# Patient Record
Sex: FEMALE | Race: WHITE | Employment: OTHER | URBAN - METROPOLITAN AREA
[De-identification: names, ages, dates, MRNs, and addresses within clinical notes are randomized per-mention and may not be internally consistent; named-entity substitution may affect disease eponyms.]

---

## 2017-02-27 ENCOUNTER — PATIENT OUTREACH (OUTPATIENT)
Dept: FAMILY MEDICINE CLINIC | Facility: CLINIC | Age: 67
End: 2017-02-27

## 2017-03-09 ENCOUNTER — TELEPHONE (OUTPATIENT)
Dept: FAMILY MEDICINE CLINIC | Facility: CLINIC | Age: 67
End: 2017-03-09

## 2017-03-09 NOTE — TELEPHONE ENCOUNTER
No future appointments.     Please call patient to have her schedule a follow up with dr. Froylan Cat to go over recent labs

## 2017-03-10 NOTE — TELEPHONE ENCOUNTER
Future Appointments  Date Time Provider Baldev Corona   3/17/2017 9:45 AM Edna Foley MD [facility] [address] [facility] [address]

## 2017-03-17 ENCOUNTER — OFFICE VISIT (OUTPATIENT)
Dept: FAMILY MEDICINE CLINIC | Facility: CLINIC | Age: 67
End: 2017-03-17

## 2017-03-17 VITALS
RESPIRATION RATE: 12 BRPM | TEMPERATURE: 99 F | WEIGHT: 293 LBS | HEART RATE: 63 BPM | BODY MASS INDEX: 48.82 KG/M2 | DIASTOLIC BLOOD PRESSURE: 80 MMHG | OXYGEN SATURATION: 99 % | HEIGHT: 65 IN | SYSTOLIC BLOOD PRESSURE: 136 MMHG

## 2017-03-17 DIAGNOSIS — E78.00 PURE HYPERCHOLESTEROLEMIA: ICD-10-CM

## 2017-03-17 DIAGNOSIS — I10 ESSENTIAL HYPERTENSION: ICD-10-CM

## 2017-03-17 DIAGNOSIS — E66.01 MORBID OBESITY, UNSPECIFIED OBESITY TYPE (HCC): ICD-10-CM

## 2017-03-17 DIAGNOSIS — E11.9 CONTROLLED TYPE 2 DIABETES MELLITUS WITHOUT COMPLICATION, UNSPECIFIED LONG TERM INSULIN USE STATUS: Primary | ICD-10-CM

## 2017-03-17 PROCEDURE — 99214 OFFICE O/P EST MOD 30 MIN: CPT | Performed by: FAMILY MEDICINE

## 2017-03-17 RX ORDER — LISINOPRIL 10 MG/1
10 TABLET ORAL DAILY
Qty: 30 TABLET | Refills: 5 | Status: SHIPPED | OUTPATIENT
Start: 2017-03-17 | End: 2017-09-10

## 2017-03-17 RX ORDER — PRAVASTATIN SODIUM 40 MG
TABLET ORAL
Qty: 30 TABLET | Refills: 5 | Status: SHIPPED | OUTPATIENT
Start: 2017-03-17 | End: 2017-09-10

## 2017-03-17 NOTE — PROGRESS NOTES
HPI:    Patient ID: Karuan Davidson is a 77year old female. HPI  Patient is here for follow-up of diabetes high blood pressure, high cholesterol, obesity. , She is doing fine and has no complaints.   Review of Systems  Except for the above, all other ROS diagnosis)  Morbid obesity, unspecified obesity type (hcc)  Essential hypertension  Pure hypercholesterolemia    Labs reviewed showing hemoglobin A1c the same at 7.5. Will increase metformin to 850 mg twice a day. Continue other medicines as directed.   C

## 2017-03-20 ENCOUNTER — TELEPHONE (OUTPATIENT)
Dept: FAMILY MEDICINE CLINIC | Facility: CLINIC | Age: 67
End: 2017-03-20

## 2017-03-20 RX ORDER — LANCETS
1 EACH MISCELLANEOUS 2 TIMES DAILY
Qty: 1 BOX | Refills: 3 | Status: SHIPPED | OUTPATIENT
Start: 2017-03-20 | End: 2017-11-28

## 2017-03-21 ENCOUNTER — HOSPITAL (OUTPATIENT)
Dept: OTHER | Age: 67
End: 2017-03-21
Attending: FAMILY MEDICINE

## 2017-03-27 ENCOUNTER — TELEPHONE (OUTPATIENT)
Dept: FAMILY MEDICINE CLINIC | Facility: CLINIC | Age: 67
End: 2017-03-27

## 2017-03-27 NOTE — TELEPHONE ENCOUNTER
Pt will schedule consultation with GI for possible colonoscopy dr. Mahendra Torres  I did inform patient that we cannot get her in sooner due to no symptoms.

## 2017-04-13 ENCOUNTER — APPOINTMENT (OUTPATIENT)
Dept: LAB | Age: 67
End: 2017-04-13
Attending: FAMILY MEDICINE
Payer: COMMERCIAL

## 2017-04-13 ENCOUNTER — TELEPHONE (OUTPATIENT)
Dept: FAMILY MEDICINE CLINIC | Facility: CLINIC | Age: 67
End: 2017-04-13

## 2017-04-13 DIAGNOSIS — R30.0 DYSURIA: Primary | ICD-10-CM

## 2017-04-13 NOTE — TELEPHONE ENCOUNTER
Patient said she was sick last week and had diarrhea, she now has burning and noticed some blood on urination. She has increased her fluids and drinking cranberry juice.  Dr Yola Hagan ordered a UA with Reflex with the Easter holiday since patient is unable to

## 2017-04-18 ENCOUNTER — TELEPHONE (OUTPATIENT)
Dept: FAMILY MEDICINE CLINIC | Facility: CLINIC | Age: 67
End: 2017-04-18

## 2017-04-18 RX ORDER — CEPHALEXIN 500 MG/1
500 CAPSULE ORAL 2 TIMES DAILY
Qty: 20 CAPSULE | Refills: 0 | Status: SHIPPED | OUTPATIENT
Start: 2017-04-18 | End: 2017-04-28

## 2017-04-18 NOTE — TELEPHONE ENCOUNTER
----- Message from Chandrika Alfonso MD sent at 4/16/2017 11:57 PM CDT -----  Slightly positive urine culture. Keflex 500 mg twice a day for 10 days. Recheck urine test after that.

## 2017-05-01 ENCOUNTER — ANESTHESIA EVENT (OUTPATIENT)
Dept: ENDOSCOPY | Facility: HOSPITAL | Age: 67
End: 2017-05-01
Payer: COMMERCIAL

## 2017-05-01 ENCOUNTER — SURGERY (OUTPATIENT)
Age: 67
End: 2017-05-01

## 2017-05-01 ENCOUNTER — ANESTHESIA (OUTPATIENT)
Dept: ENDOSCOPY | Facility: HOSPITAL | Age: 67
End: 2017-05-01
Payer: COMMERCIAL

## 2017-05-01 ENCOUNTER — HOSPITAL ENCOUNTER (OUTPATIENT)
Facility: HOSPITAL | Age: 67
Setting detail: HOSPITAL OUTPATIENT SURGERY
Discharge: HOME | End: 2017-05-01
Attending: INTERNAL MEDICINE | Admitting: INTERNAL MEDICINE
Payer: COMMERCIAL

## 2017-05-01 VITALS
SYSTOLIC BLOOD PRESSURE: 134 MMHG | HEART RATE: 66 BPM | TEMPERATURE: 97 F | OXYGEN SATURATION: 95 % | HEIGHT: 64 IN | WEIGHT: 293 LBS | DIASTOLIC BLOOD PRESSURE: 70 MMHG | BODY MASS INDEX: 50.02 KG/M2 | RESPIRATION RATE: 17 BRPM

## 2017-05-01 DIAGNOSIS — K64.9 HEMORRHOIDS: Primary | ICD-10-CM

## 2017-05-01 DIAGNOSIS — K57.30 DIVERTICULOSIS OF COLON: ICD-10-CM

## 2017-05-01 DIAGNOSIS — K63.5 POLYP OF HEPATIC FLEXURE OF COLON: ICD-10-CM

## 2017-05-01 PROCEDURE — 0DBK8ZX EXCISION OF ASCENDING COLON, VIA NATURAL OR ARTIFICIAL OPENING ENDOSCOPIC, DIAGNOSTIC: ICD-10-PCS | Performed by: INTERNAL MEDICINE

## 2017-05-01 PROCEDURE — 0DBL8ZX EXCISION OF TRANSVERSE COLON, VIA NATURAL OR ARTIFICIAL OPENING ENDOSCOPIC, DIAGNOSTIC: ICD-10-PCS | Performed by: INTERNAL MEDICINE

## 2017-05-01 PROCEDURE — 88305 TISSUE EXAM BY PATHOLOGIST: CPT | Performed by: INTERNAL MEDICINE

## 2017-05-01 PROCEDURE — 3E0H8GC INTRODUCTION OF OTHER THERAPEUTIC SUBSTANCE INTO LOWER GI, VIA NATURAL OR ARTIFICIAL OPENING ENDOSCOPIC: ICD-10-PCS | Performed by: INTERNAL MEDICINE

## 2017-05-01 RX ORDER — SODIUM CHLORIDE 0.9 % (FLUSH) 0.9 %
10 SYRINGE (ML) INJECTION AS NEEDED
Status: DISCONTINUED | OUTPATIENT
Start: 2017-05-01 | End: 2017-05-01

## 2017-05-01 RX ORDER — SODIUM CHLORIDE, SODIUM LACTATE, POTASSIUM CHLORIDE, CALCIUM CHLORIDE 600; 310; 30; 20 MG/100ML; MG/100ML; MG/100ML; MG/100ML
INJECTION, SOLUTION INTRAVENOUS CONTINUOUS
Status: DISCONTINUED | OUTPATIENT
Start: 2017-05-01 | End: 2017-05-01

## 2017-05-01 RX ORDER — LIDOCAINE HYDROCHLORIDE 10 MG/ML
INJECTION, SOLUTION EPIDURAL; INFILTRATION; INTRACAUDAL; PERINEURAL AS NEEDED
Status: DISCONTINUED | OUTPATIENT
Start: 2017-05-01 | End: 2017-05-01 | Stop reason: SURG

## 2017-05-01 RX ORDER — ONDANSETRON 2 MG/ML
4 INJECTION INTRAMUSCULAR; INTRAVENOUS ONCE AS NEEDED
Status: DISCONTINUED | OUTPATIENT
Start: 2017-05-01 | End: 2017-05-01

## 2017-05-01 RX ORDER — ONDANSETRON 2 MG/ML
INJECTION INTRAMUSCULAR; INTRAVENOUS AS NEEDED
Status: DISCONTINUED | OUTPATIENT
Start: 2017-05-01 | End: 2017-05-01 | Stop reason: SURG

## 2017-05-01 RX ORDER — SODIUM CHLORIDE, SODIUM LACTATE, POTASSIUM CHLORIDE, CALCIUM CHLORIDE 600; 310; 30; 20 MG/100ML; MG/100ML; MG/100ML; MG/100ML
INJECTION, SOLUTION INTRAVENOUS CONTINUOUS PRN
Status: DISCONTINUED | OUTPATIENT
Start: 2017-05-01 | End: 2017-05-01 | Stop reason: SURG

## 2017-05-01 RX ORDER — MIDAZOLAM HYDROCHLORIDE 1 MG/ML
1 INJECTION INTRAMUSCULAR; INTRAVENOUS EVERY 5 MIN PRN
Status: DISCONTINUED | OUTPATIENT
Start: 2017-05-01 | End: 2017-05-01

## 2017-05-01 RX ADMIN — ONDANSETRON 4 MG: 2 INJECTION INTRAMUSCULAR; INTRAVENOUS at 14:01:00

## 2017-05-01 RX ADMIN — SODIUM CHLORIDE, SODIUM LACTATE, POTASSIUM CHLORIDE, CALCIUM CHLORIDE: 600; 310; 30; 20 INJECTION, SOLUTION INTRAVENOUS at 14:20:00

## 2017-05-01 RX ADMIN — SODIUM CHLORIDE, SODIUM LACTATE, POTASSIUM CHLORIDE, CALCIUM CHLORIDE: 600; 310; 30; 20 INJECTION, SOLUTION INTRAVENOUS at 13:55:00

## 2017-05-01 RX ADMIN — LIDOCAINE HYDROCHLORIDE 50 MG: 10 INJECTION, SOLUTION EPIDURAL; INFILTRATION; INTRACAUDAL; PERINEURAL at 13:56:00

## 2017-05-01 NOTE — OPERATIVE REPORT
COLONOSCOPY REPORT    Patient Name:  Johana Pena Record #: [id number]  YOB: 1950  Date of Procedure: 5/1/2017    Referring physician: Justino Taylor MD    Surgeon:  Faith Rubio.  Emani Parson MD    Pre-op diagnosis: Colon cancer screening Good (clear liquid covering up to 25% of mucosa, but >90% of mucosa seen)  1 Excellent (>95% of mucosa seen)

## 2017-05-01 NOTE — H&P
RE-PROCEDURE UPDATE    HPI: Heather Perera is a 77year old female. 9/14/1950. Patient presents for a colonoscopy. ALLERGIES: No Known Allergies      No current outpatient prescriptions on file.   Past Medical History   Diagnosis Date   • Laboratory ex

## 2017-05-01 NOTE — ANESTHESIA POSTPROCEDURE EVALUATION
Patient: Lisa Jewell    Procedure Summary     Date Anesthesia Start Anesthesia Stop Room / Location    05/01/17 1355  [facility] ENDOSCOPY 05 / [facility] ENDOSCOPY       Procedure Diagnosis Surgeon Responsible Provider    COLONOSCOPY (N/A ) Special screening for [facility]

## 2017-05-01 NOTE — ANESTHESIA PREPROCEDURE EVALUATION
Anesthesia PreOp Note    HPI:     Carlyn Nicolas is a 77year old female who presents for preoperative consultation requested by: Urszula Bran MD    Date of Surgery: 5/1/2017    Procedure(s):  COLONOSCOPY  Indication: Special screening for malignant ne MOUTH TWO TIMES A DAY WITH MEALS ) Disp: 60 tablet Rfl: 3 4/30/2017       No current [facility]-ordered facility-administered medications on file. No current [facility]-ordered outpatient prescriptions on file. No Known Allergies    History reviewed.  No pertinent analgesics  Informed Consent Plan and Risks Discussed With:  Patient and spouse      I have informed Leah Maloney  of the nature of the anesthetic plan, benefits, risks, major complications, and any alternative forms of anesthetic management.    All of the

## 2017-06-02 ENCOUNTER — OFFICE VISIT (OUTPATIENT)
Dept: FAMILY MEDICINE CLINIC | Facility: CLINIC | Age: 67
End: 2017-06-02

## 2017-06-02 ENCOUNTER — APPOINTMENT (OUTPATIENT)
Dept: GENERAL RADIOLOGY | Age: 67
End: 2017-06-02
Attending: FAMILY MEDICINE
Payer: COMMERCIAL

## 2017-06-02 ENCOUNTER — APPOINTMENT (OUTPATIENT)
Dept: GENERAL RADIOLOGY | Facility: HOSPITAL | Age: 67
DRG: 202 | End: 2017-06-02
Attending: EMERGENCY MEDICINE
Payer: COMMERCIAL

## 2017-06-02 ENCOUNTER — HOSPITAL ENCOUNTER (OUTPATIENT)
Age: 67
Discharge: ANOTHER HOSPITAL | End: 2017-06-02
Attending: FAMILY MEDICINE
Payer: COMMERCIAL

## 2017-06-02 ENCOUNTER — HOSPITAL ENCOUNTER (INPATIENT)
Facility: HOSPITAL | Age: 67
LOS: 2 days | Discharge: HOME | DRG: 202 | End: 2017-06-04
Attending: EMERGENCY MEDICINE | Admitting: HOSPITALIST
Payer: COMMERCIAL

## 2017-06-02 VITALS
WEIGHT: 293 LBS | BODY MASS INDEX: 50.02 KG/M2 | HEART RATE: 72 BPM | TEMPERATURE: 98 F | DIASTOLIC BLOOD PRESSURE: 74 MMHG | HEIGHT: 64 IN | SYSTOLIC BLOOD PRESSURE: 136 MMHG | RESPIRATION RATE: 22 BRPM | OXYGEN SATURATION: 94 %

## 2017-06-02 VITALS
OXYGEN SATURATION: 99 % | DIASTOLIC BLOOD PRESSURE: 65 MMHG | TEMPERATURE: 99 F | SYSTOLIC BLOOD PRESSURE: 146 MMHG | RESPIRATION RATE: 20 BRPM | HEART RATE: 80 BPM

## 2017-06-02 DIAGNOSIS — J40 BRONCHITIS: Primary | ICD-10-CM

## 2017-06-02 DIAGNOSIS — R73.9 HYPERGLYCEMIA: ICD-10-CM

## 2017-06-02 DIAGNOSIS — D72.829 LEUKOCYTOSIS, UNSPECIFIED TYPE: ICD-10-CM

## 2017-06-02 DIAGNOSIS — R06.00 DYSPNEA ON EXERTION: Primary | ICD-10-CM

## 2017-06-02 DIAGNOSIS — R06.2 WHEEZING: ICD-10-CM

## 2017-06-02 DIAGNOSIS — R09.02 HYPOXIA: Primary | ICD-10-CM

## 2017-06-02 DIAGNOSIS — R60.0 PEDAL EDEMA: ICD-10-CM

## 2017-06-02 DIAGNOSIS — Z02.9 ENCOUNTER FOR ADMINISTRATIVE EXAMINATIONS: ICD-10-CM

## 2017-06-02 DIAGNOSIS — R09.02 HYPOXIA: ICD-10-CM

## 2017-06-02 PROBLEM — R06.09 DYSPNEA ON EXERTION: Status: ACTIVE | Noted: 2017-06-02

## 2017-06-02 PROBLEM — E87.3 METABOLIC ALKALOSIS: Status: ACTIVE | Noted: 2017-06-02

## 2017-06-02 PROBLEM — Z91.81 AT RISK FOR FALLING: Status: ACTIVE | Noted: 2017-06-02

## 2017-06-02 PROBLEM — E87.5 HYPERKALEMIA: Status: ACTIVE | Noted: 2017-06-02

## 2017-06-02 PROCEDURE — 94640 AIRWAY INHALATION TREATMENT: CPT

## 2017-06-02 PROCEDURE — 99215 OFFICE O/P EST HI 40 MIN: CPT

## 2017-06-02 PROCEDURE — 99223 1ST HOSP IP/OBS HIGH 75: CPT | Performed by: HOSPITALIST

## 2017-06-02 PROCEDURE — 71020 XR CHEST PA + LAT CHEST (CPT=71020): CPT | Performed by: FAMILY MEDICINE

## 2017-06-02 PROCEDURE — 99205 OFFICE O/P NEW HI 60 MIN: CPT

## 2017-06-02 PROCEDURE — 71010 XR CHEST AP PORTABLE  (CPT=71010): CPT | Performed by: EMERGENCY MEDICINE

## 2017-06-02 PROCEDURE — 94640 AIRWAY INHALATION TREATMENT: CPT | Performed by: NURSE PRACTITIONER

## 2017-06-02 RX ORDER — IPRATROPIUM BROMIDE AND ALBUTEROL SULFATE 2.5; .5 MG/3ML; MG/3ML
3 SOLUTION RESPIRATORY (INHALATION) EVERY 6 HOURS
Status: DISCONTINUED | OUTPATIENT
Start: 2017-06-02 | End: 2017-06-03

## 2017-06-02 RX ORDER — IPRATROPIUM BROMIDE AND ALBUTEROL SULFATE 2.5; .5 MG/3ML; MG/3ML
3 SOLUTION RESPIRATORY (INHALATION) ONCE
Status: COMPLETED | OUTPATIENT
Start: 2017-06-02 | End: 2017-06-02

## 2017-06-02 RX ORDER — METHYLPREDNISOLONE SODIUM SUCCINATE 40 MG/ML
40 INJECTION, POWDER, LYOPHILIZED, FOR SOLUTION INTRAMUSCULAR; INTRAVENOUS EVERY 8 HOURS
Status: COMPLETED | OUTPATIENT
Start: 2017-06-03 | End: 2017-06-03

## 2017-06-02 RX ORDER — PRAVASTATIN SODIUM 10 MG
10 TABLET ORAL NIGHTLY
Status: DISCONTINUED | OUTPATIENT
Start: 2017-06-02 | End: 2017-06-04

## 2017-06-02 RX ORDER — LISINOPRIL 10 MG/1
10 TABLET ORAL DAILY
Status: DISCONTINUED | OUTPATIENT
Start: 2017-06-02 | End: 2017-06-04

## 2017-06-02 RX ORDER — METHYLPREDNISOLONE SODIUM SUCCINATE 125 MG/2ML
125 INJECTION, POWDER, LYOPHILIZED, FOR SOLUTION INTRAMUSCULAR; INTRAVENOUS ONCE
Status: COMPLETED | OUTPATIENT
Start: 2017-06-02 | End: 2017-06-02

## 2017-06-02 RX ORDER — HEPARIN SODIUM 5000 [USP'U]/ML
5000 INJECTION, SOLUTION INTRAVENOUS; SUBCUTANEOUS EVERY 8 HOURS SCHEDULED
Status: DISCONTINUED | OUTPATIENT
Start: 2017-06-02 | End: 2017-06-04

## 2017-06-02 RX ORDER — DEXTROSE MONOHYDRATE 25 G/50ML
50 INJECTION, SOLUTION INTRAVENOUS
Status: DISCONTINUED | OUTPATIENT
Start: 2017-06-02 | End: 2017-06-04

## 2017-06-02 RX ORDER — IPRATROPIUM BROMIDE AND ALBUTEROL SULFATE 2.5; .5 MG/3ML; MG/3ML
3 SOLUTION RESPIRATORY (INHALATION) ONCE
Status: DISCONTINUED | OUTPATIENT
Start: 2017-06-02 | End: 2017-06-02

## 2017-06-02 RX ADMIN — IPRATROPIUM BROMIDE AND ALBUTEROL SULFATE 3 ML: 2.5; .5 SOLUTION RESPIRATORY (INHALATION) at 14:06:00

## 2017-06-02 NOTE — ED INITIAL ASSESSMENT (HPI)
Pt c/o 5 days of cough with shortness of breath. Went to walk in clinic, received one neb treatment at 14:30 today. Arrived here dyspneic. Dr. Katelynn Rosado at bedside.   Continuous pulse ox, neb begun on arrival.

## 2017-06-02 NOTE — ED NOTES
2 attempted IV start to each forearm unsuccessful, unable to fully advance catheter. Dr. St Speak notified.

## 2017-06-02 NOTE — ED PROVIDER NOTES
Patient Seen in: [facility] [address] [place] [address],[address]    History   Patient presents with:  Breathing Problem    Stated Complaint: cough    HPI  78 yo F here with cough and congestion - sent here from [facility] - received one treatment there and apparently was 05/01/1987    Smokeless Status: Never Used                        Alcohol Use: No                Review of Systems    Positive for stated complaint: cough  Other systems are as noted in HPI. Constitutional and vital signs reviewed.       All other systems (cpt=71020)    6/2/2017  PROCEDURE:  XR CHEST PA + LAT CHEST (CPT=71020)  INDICATIONS:  cough  COMPARISON:  [facility] , CHEST 1-VIEW, 6/29/2004, 0:13. TECHNIQUE:  PA and lateral chest radiographs were obtained.   PATIENT STATED HISTORY: (As transcribed by Lino Disposition and Plan     Clinical Impression:  Hypoxia  (primary encounter diagnosis)  Wheezing  Pedal edema    Disposition: Ic to ed    Follow-up:        Go to the ER now for further evaluation.        Medications Prescribed:  Current Discharge Medica

## 2017-06-02 NOTE — ED NOTES
Pt without O2 running, with continuous pulse ox. O2 sat stays between 94 and 97% while pt sitting quietly.

## 2017-06-02 NOTE — PROGRESS NOTES
HPI:   Susan Dai is a 77year old female who presents with ill symptoms for  5  days.  Patient reports sore throat only at the beginning of sx's, cough with clear/yellow colored sputum, cough is not keeping pt up at night, wheezing, OTC cold meds have n exertion  GI: no nausea or abdominal pain, appetite normal  NEURO: admits to headaches with coughing    EXAM:   /74 mmHg  Pulse 72  Temp(Src) 98.4 °F (36.9 °C) (Oral)  Resp 22  Ht 64\"  Wt 305 lb  BMI 52.33 kg/m2  SpO2 94%  LMP 01/01/2000  GENERAL: w

## 2017-06-02 NOTE — ED NOTES
Pt's pulse ox dropped to 85 while ambulating back from xray. O2 at 2 liters per nasal cannula applied upon return to room. O2 sienna to 99%.

## 2017-06-02 NOTE — ED INITIAL ASSESSMENT (HPI)
Pt here with c/o sore throat and cough/congestion since Monday, pt sent here from walk in clinic for low SPO2

## 2017-06-03 PROCEDURE — 99232 SBSQ HOSP IP/OBS MODERATE 35: CPT | Performed by: INTERNAL MEDICINE

## 2017-06-03 RX ORDER — IPRATROPIUM BROMIDE AND ALBUTEROL SULFATE 2.5; .5 MG/3ML; MG/3ML
3 SOLUTION RESPIRATORY (INHALATION) EVERY 6 HOURS
Status: DISCONTINUED | OUTPATIENT
Start: 2017-06-03 | End: 2017-06-03

## 2017-06-03 RX ORDER — CODEINE PHOSPHATE AND GUAIFENESIN 10; 100 MG/5ML; MG/5ML
5 SOLUTION ORAL EVERY 4 HOURS PRN
Status: DISCONTINUED | OUTPATIENT
Start: 2017-06-03 | End: 2017-06-04

## 2017-06-03 RX ORDER — BENZONATATE 200 MG/1
200 CAPSULE ORAL 3 TIMES DAILY PRN
Status: DISCONTINUED | OUTPATIENT
Start: 2017-06-03 | End: 2017-06-04

## 2017-06-03 RX ORDER — IPRATROPIUM BROMIDE AND ALBUTEROL SULFATE 2.5; .5 MG/3ML; MG/3ML
3 SOLUTION RESPIRATORY (INHALATION) EVERY 4 HOURS
Status: DISCONTINUED | OUTPATIENT
Start: 2017-06-03 | End: 2017-06-04

## 2017-06-03 NOTE — PROGRESS NOTES
Per UMAIR patient wanting to be a DNR. When placing DNR band on patient, patient stated that Advanced Directives were not clear to her and she wants to remain a full code.

## 2017-06-03 NOTE — PROGRESS NOTES
[facility] HOSPITAL[facility]  Progress Note     Rosa Elena Urbina Patient Status:  Observation    1950 MRN [id number]   [facility] 5NW-A Attending Saji Martini MD   Hosp Day # 1 PCP Karla Mcgarry MD     Chief Complaint: cough    S: Patient with congestion however clinically not in FOL  2. Mild hyperkalemia - repeat K , if elevated will treat  3. Leukocytosis - due to #1  4. Morbid obesity - weight loss advised  5. DM 2-   1. Hyperglycemia protocol  2. Monitor closely on steroid   6.  Probable HEATH

## 2017-06-03 NOTE — PLAN OF CARE
Diabetes/Glucose Control    • Glucose maintained within prescribed range Progressing        Patient/Family Goals    • Patient/Family Short Term Goal Progressing        RESPIRATORY - ADULT    • Achieves optimal ventilation and oxygenation Progressing

## 2017-06-03 NOTE — PAYOR COMM NOTE
Attending Physician: Tomy Keane MD    Review Type: ADMISSION   Reviewer: Thalia Coates       Date: Selam 3, 2017 - 2:37 PM  Payor: [id number] Number: N/A  Admit date: 6/2/2017  5:15 PM   Admitted from Emergency Dept.: yes       ED P ACCU-CHEK FASTCLIX LANCETS Does not apply Misc,  1 lancet by Finger stick route 2 (two) times daily. Use as directed. lisinopril 10 MG Oral Tab,  Take 1 tablet (10 mg total) by mouth daily.    Pravastatin Sodium 40 MG Oral Tab,  TAKE 1 TABLET BY MOUTH NIG Absolute Prelim 12.34 (*)     Neutrophil Absolute 12.34 (*)     Monocyte Absolute 1.27 (*)     All other components within normal limits   D-DIMER - Normal    Narrative:     FEU = Fibrinogen Equivalent Units.     D-Dimer results of less than 0.5 ug/mL (FEU) COMPARISON:  [facility] CHEST PA + LAT CHEST (UIR=[id number]), 6/02/2017, 15:56. INDICATIONS:  MITRA  PATIENT STATED HISTORY: (As transcribed by Technologist)  Patient offered no additional history at this time. FINDINGS:  Heart size is mildly enlarged. female with sore throat dry cough nasal congestion for about 5 days. Patient states that her family members have been sick at home with similar symptoms for the past 2 weeks.   She did not feel she has to see her primary care physician until today when her GLU  179*   BUN  16   CREATSERUM  1.05*   CA  9.2   ALB  3.0*   NA  137   K  5.6*   CL  102   CO2  34.0*   ALKPHO  70   AST  27   ALT  20   BILT  0.4   TP  7.1       Estimated Creatinine Clearance: 45.5 mL/min (based on Cr of 1.05).     No results for inp bronchiolitis due to O2 sat on room air in 70's, Respiratory: + bilateral wheezing   on 6/3/17 assessment remains despite IV steriods, nebs, and O2, reports of SOB  OTHER:

## 2017-06-03 NOTE — CM/SW NOTE
SW received consult regarding Advanced Directives. SW spoke with patient who confirmed she'd like to be made a DNR.   SW relayed information to patient's RN, RN to address with MD.

## 2017-06-03 NOTE — PROGRESS NOTES
Sp02 percent on room air at rest 87%  Sp02 percent ambulation on room air 85%  Sp02 percent ambulation on 02 91% on 6 liters per minute

## 2017-06-03 NOTE — H&P
[facility]PITAL[facility]  History and Physical     Archie Miller Patient Status:  Observation    1950 MRN [id number]   [facility] 5NW-A Attending Ladan Quiñones MD   Hosp Day # 0 PCP Olya Lamb MD     Chief Complaint: Shortness of breat Encounter:  MetFORMIN HCl 850 MG Oral Tab TAKE 1 TABLET BY MOUTH TWO TIMES A DAY WITH MEALS Disp:  Rfl: 5   lisinopril 10 MG Oral Tab Take 1 tablet (10 mg total) by mouth daily.  Disp: 30 tablet Rfl: 5   Pravastatin Sodium 40 MG Oral Tab TAKE 1 TABLET BY MO Estimated Creatinine Clearance: 45.5 mL/min (based on Cr of 1.05). No results for input(s): PTP, INR in the last 72 hours. Recent Labs   Lab  06/02/17   1730   TROP  <0.046       Imaging: Imaging data reviewed in Epic.       ASSESSMENT / PLAN:

## 2017-06-03 NOTE — ED PROVIDER NOTES
Patient Seen in: [facility] Emergency Department    History   Patient presents with:  Dyspnea MITRA SOB (respiratory)    Stated Complaint: MITRA    HPI    14-year-old female presents emergency department who is here with sore throat and cough congestion systems are as noted in HPI. Constitutional and vital signs reviewed. All other systems reviewed and negative except as noted above. PSFH elements reviewed from today and agreed except as otherwise stated in HPI.     Physical Exam       ED Triage V Neutrophil Absolute Prelim 12.34 (*)     Neutrophil Absolute 12.34 (*)     Monocyte Absolute 1.27 (*)     All other components within normal limits   D-DIMER - Normal    Narrative:     FEU = Fibrinogen Equivalent Units.     D-Dimer results of less than effusion or pneumothorax. There is a stable tortuous, ectatic thoracic aorta. Mild degenerative changes are seen in the thoracic spine. 6/2/2017  CONCLUSION:  Mild left basilar atelectasis or scarring and mild cardiomegaly.  No consolidation or effusio on Admission  Date Reviewed: 6/2/2017          ICD-10-CM Noted POA    Dyspnea on exertion R06.09 6/2/2017 Unknown    Hyperglycemia R73.9 6/2/2017 Yes    Hyperkalemia E87.5 6/2/2017 Yes    Leukocytosis D72.829 4/4/3332 Yes    Metabolic alkalosis T31.9 7/3/2

## 2017-06-04 VITALS
BODY MASS INDEX: 50.02 KG/M2 | HEIGHT: 64 IN | HEART RATE: 74 BPM | TEMPERATURE: 98 F | OXYGEN SATURATION: 92 % | DIASTOLIC BLOOD PRESSURE: 67 MMHG | WEIGHT: 293 LBS | SYSTOLIC BLOOD PRESSURE: 146 MMHG | RESPIRATION RATE: 18 BRPM

## 2017-06-04 PROCEDURE — 99239 HOSP IP/OBS DSCHRG MGMT >30: CPT | Performed by: INTERNAL MEDICINE

## 2017-06-04 RX ORDER — ALBUTEROL SULFATE 90 UG/1
1 AEROSOL, METERED RESPIRATORY (INHALATION) EVERY 4 HOURS PRN
Qty: 1 INHALER | Refills: 0 | Status: SHIPPED | OUTPATIENT
Start: 2017-06-04 | End: 2018-03-10

## 2017-06-04 RX ORDER — IPRATROPIUM BROMIDE AND ALBUTEROL SULFATE 2.5; .5 MG/3ML; MG/3ML
3 SOLUTION RESPIRATORY (INHALATION) EVERY 4 HOURS PRN
Status: DISCONTINUED | OUTPATIENT
Start: 2017-06-04 | End: 2017-06-04

## 2017-06-04 RX ORDER — HEPARIN SODIUM 5000 [USP'U]/ML
7500 INJECTION, SOLUTION INTRAVENOUS; SUBCUTANEOUS EVERY 8 HOURS SCHEDULED
Status: DISCONTINUED | OUTPATIENT
Start: 2017-06-04 | End: 2017-06-04

## 2017-06-04 RX ORDER — PREDNISONE 20 MG/1
60 TABLET ORAL
Qty: 9 TABLET | Refills: 0 | Status: SHIPPED | OUTPATIENT
Start: 2017-06-05 | End: 2017-06-04

## 2017-06-04 RX ORDER — ALBUTEROL SULFATE 90 UG/1
1 AEROSOL, METERED RESPIRATORY (INHALATION) EVERY 4 HOURS PRN
Qty: 1 INHALER | Refills: 0 | Status: SHIPPED | OUTPATIENT
Start: 2017-06-04 | End: 2017-06-04

## 2017-06-04 RX ORDER — PREDNISONE 20 MG/1
60 TABLET ORAL
Qty: 9 TABLET | Refills: 0 | Status: SHIPPED | OUTPATIENT
Start: 2017-06-05 | End: 2017-06-12

## 2017-06-04 NOTE — PROGRESS NOTES
[facility] Progress Note:  Anticoagulation Weight Dose Adjustment for heparin    Cary Gross is a 77year old female who has been prescribed heparin 5000 units every 8 hrs for VTE prophylaxis.       Estimated Creatinine Clearance: 45.5 mL/min (based on

## 2017-06-04 NOTE — PLAN OF CARE
Patient/Family Goals    • Patient/Family Long Term Goal Progressing    • Patient/Family Short Term Goal Progressing        RESPIRATORY - ADULT    • Achieves optimal ventilation and oxygenation Progressing          Patient is alert and oriented.   Denies tirso

## 2017-06-04 NOTE — PROGRESS NOTES
Sp02 percent on room air at rest 86%  Sp02 percent ambulation on room air not test  Sp02 percent ambulation on 02 88-91% on 3 liters per minute

## 2017-06-04 NOTE — CM/SW NOTE
Referral accepted by Rosina Eng pending insurance verification on Monday. RT to bring portable tank to pts room. Pt to call Rosina Eng as she is leaving the hospital to coordinate deliver of home equipment.  RN updated

## 2017-06-04 NOTE — CM/SW NOTE
[facility] unable to take pts insurance. New referral made to [facility], referral pending.  sw to follow

## 2017-06-04 NOTE — PROGRESS NOTES
NURSING DISCHARGE NOTE    Discharged Home via Wheelchair. Accompanied by Spouse and Support staff  Belongings Taken by patient/family.     Discharge instructions reviewed with patient and spouse, instructed on new medications, instructed on home oxygen

## 2017-06-04 NOTE — PHYSICAL THERAPY NOTE
PHYSICAL THERAPY QUICK EVALUATION - INPATIENT    Room Number: 478/471-A  Evaluation Date: 6/4/2017  Presenting Problem: Dyspnea  Physician Order: PT Eval and Treat    Problem List  Principal Problem:    Acute bronchospasm  Active Problems:    Hyperglycemia Sitting down on and standing up from a chair with arms (e.g., wheelchair, bedside commode, etc.): None   -   Moving from lying on back to sitting on the side of the bed?: None   How much help from another person does the patient currently need. ..   -   Mov surfaces At previous, functional level  Safely and independently

## 2017-06-04 NOTE — PLAN OF CARE
Diabetes/Glucose Control    • Glucose maintained within prescribed range Completed        Impaired Functional Mobility    • Achieve highest/safest level of mobility/gait Completed        Patient/Family Goals    • Patient/Family Long Term Goal Completed

## 2017-06-04 NOTE — PROGRESS NOTES
[facility]ITAL[facility]  Progress Note     Yulissa Close Patient Status:  Inpatient    1950 MRN [id number]   [facility] 5NW-A Attending Rosa Desai MD   Hosp Day # 2 PCP Amanda Carl MD     Chief Complaint: hypoxia    S: Patient overa Subcutaneous TID CC and HS       ASSESSMENT / PLAN:     1. Acute bronchospasm  - resolved   1. Cont steroid- change to oral -> taper dose at home   2. Neb PRN    3. O2 eval for home   4. RVP pending    5. Influenza/ RSV neg   6.  CXR without pneumonia, + va

## 2017-06-04 NOTE — DISCHARGE SUMMARY
[facility]OSPITAL[facility]  DISCHARGE SUMMARY     Leah Maloney Patient Status:  Inpatient    1950 MRN [id number]   [facility] 5NW-A Attending Joelle Day MD   Hosp Day # 2 PCP Juan Carlos Prater MD     Date of Admission: 2017  Date of Dis and placed on steroid IV  nebulizer treatment her symptoms resolved. Her RVP negative. She was noted to be hypoxia as a result home O2 has been arranged. She will need further outpatient f/u with PCP for further evaluation of home O2 needs.      Procedures 8:36 PM   Commonly known as:  PRAVACHOL        TAKE 1 TABLET BY MOUTH NIGHTLY    Quantity:  30 tablet   Refills:  5            Where to Get Your Medications      Please  your prescriptions at the location directed by your doctor or nurse     Bring a

## 2017-06-04 NOTE — CM/SW NOTE
RT notified this worker that there are no [facility] tanks here at the hospital. sw contacted [facility] and he will be here between 6-8 to bring tank for pt.  RN updated

## 2017-06-06 ENCOUNTER — PATIENT OUTREACH (OUTPATIENT)
Dept: CASE MANAGEMENT | Age: 67
End: 2017-06-06

## 2017-06-06 DIAGNOSIS — R06.00 DYSPNEA ON EXERTION: ICD-10-CM

## 2017-06-06 DIAGNOSIS — R09.02 HYPOXIA: ICD-10-CM

## 2017-06-06 DIAGNOSIS — J98.01 ACUTE BRONCHOSPASM: Primary | ICD-10-CM

## 2017-06-06 NOTE — PROGRESS NOTES
Initial Post Discharge Follow Up   Discharge Date: 6/4/17  Contact Date: 6/6/2017    Consent Verification:  Assessment Completed With: Patient  HIPAA Verified?   Yes    Discharge Dx:   Acute Bronchospasm    General:   • How have you been since your disch tablets on days 2-4Take 1 tablet on days 5-6 Disp: 9 tablet Rfl: 0   Albuterol Sulfate HFA (PROAIR HFA) 108 (90 Base) MCG/ACT Inhalation Aero Soln Inhale 1 puff into the lungs every 4 (four) hours as needed for Wheezing or Shortness of Breath.  Disp: 1 Inha feels like she is wheezing she should use the inhaler and see how she feels. It will help to open up your airways. [facility] reviewed the proper way to use inhaler. Are you currently on oxygen? yes   How many Liters?  3L per N/C  Do you have any questions about

## 2017-06-06 NOTE — CM/SW NOTE
Patient discharged on 06/04/2017 as previously planned.          06/06/17 0800   Discharge disposition   Discharged to: Home or Self   Name of Marta. 78 services after discharge DME   E provider [address]   Discharge transporta

## 2017-06-08 ENCOUNTER — OFFICE VISIT (OUTPATIENT)
Dept: FAMILY MEDICINE CLINIC | Facility: CLINIC | Age: 67
End: 2017-06-08

## 2017-06-08 ENCOUNTER — TELEPHONE (OUTPATIENT)
Dept: FAMILY MEDICINE CLINIC | Facility: CLINIC | Age: 67
End: 2017-06-08

## 2017-06-08 VITALS
SYSTOLIC BLOOD PRESSURE: 136 MMHG | BODY MASS INDEX: 50.02 KG/M2 | TEMPERATURE: 98 F | WEIGHT: 293 LBS | RESPIRATION RATE: 16 BRPM | OXYGEN SATURATION: 91 % | DIASTOLIC BLOOD PRESSURE: 82 MMHG | HEART RATE: 88 BPM | HEIGHT: 64 IN

## 2017-06-08 DIAGNOSIS — R09.02 HYPOXIA: Primary | ICD-10-CM

## 2017-06-08 DIAGNOSIS — D72.829 LEUKOCYTOSIS, UNSPECIFIED TYPE: ICD-10-CM

## 2017-06-08 DIAGNOSIS — J98.01 ACUTE BRONCHOSPASM: ICD-10-CM

## 2017-06-08 DIAGNOSIS — E66.01 MORBID OBESITY, UNSPECIFIED OBESITY TYPE (HCC): ICD-10-CM

## 2017-06-08 DIAGNOSIS — R06.00 DYSPNEA ON EXERTION: ICD-10-CM

## 2017-06-08 DIAGNOSIS — Z91.81 AT RISK FOR FALLING: ICD-10-CM

## 2017-06-08 DIAGNOSIS — R73.9 HYPERGLYCEMIA: ICD-10-CM

## 2017-06-08 PROCEDURE — 99496 TRANSJ CARE MGMT HIGH F2F 7D: CPT | Performed by: FAMILY MEDICINE

## 2017-06-08 RX ORDER — AMOXICILLIN AND CLAVULANATE POTASSIUM 875; 125 MG/1; MG/1
1 TABLET, FILM COATED ORAL 2 TIMES DAILY
Qty: 20 TABLET | Refills: 0 | Status: SHIPPED | OUTPATIENT
Start: 2017-06-08 | End: 2017-06-18

## 2017-06-08 NOTE — PROGRESS NOTES
HPI:    Leah Maloney is a 77year old female here today for hospital follow up.     Discharge Date: 6/4/17  Hospital Discharge Diagnosis: Acute bronchospasm   TCM Diagnosis:  Other: Acute bronchospasm ; still recommend for TCM follow-up  Moderate or High BY MOUTH TWO TIMES A DAY WITH MEALS Disp:  Rfl: 5   Glucose Blood (ACCU-CHEK SMARTVIEW) In Vitro Strip Check blood sugar morning fasting and before supper Disp: 100 strip Rfl: 3   ACCU-CHEK FASTCLIX LANCETS Does not apply Misc 1 lancet by Finger stick rout developed, well nourished, in no apparent distress  SKIN: no rashes, no suspicious lesions  HEENT: atraumatic, normocephalic, ears and throat are clear  EYES: PERRLA, EOMI, conjunctiva are clear  NECK: supple, no adenopathy, no bruits  CHEST: no chest tend

## 2017-06-09 ENCOUNTER — TELEPHONE (OUTPATIENT)
Dept: FAMILY MEDICINE CLINIC | Facility: CLINIC | Age: 67
End: 2017-06-09

## 2017-06-12 ENCOUNTER — OFFICE VISIT (OUTPATIENT)
Dept: FAMILY MEDICINE CLINIC | Facility: CLINIC | Age: 67
End: 2017-06-12

## 2017-06-12 VITALS
OXYGEN SATURATION: 98 % | RESPIRATION RATE: 14 BRPM | SYSTOLIC BLOOD PRESSURE: 122 MMHG | DIASTOLIC BLOOD PRESSURE: 68 MMHG | HEART RATE: 86 BPM | TEMPERATURE: 99 F | WEIGHT: 293 LBS | BODY MASS INDEX: 50 KG/M2

## 2017-06-12 DIAGNOSIS — R09.02 HYPOXIA: ICD-10-CM

## 2017-06-12 DIAGNOSIS — R06.00 DYSPNEA ON EXERTION: ICD-10-CM

## 2017-06-12 DIAGNOSIS — R05.9 COUGH: Primary | ICD-10-CM

## 2017-06-12 PROCEDURE — 99214 OFFICE O/P EST MOD 30 MIN: CPT | Performed by: FAMILY MEDICINE

## 2017-06-12 NOTE — PROGRESS NOTES
HPI:    Patient ID: Bryan Almazan is a 77year old female. HPI  Patient is here for follow-up of cough and shortness of breath. She is doing much better she states. She has slight cough minimal congestion and minimal shortness of breath.   No fever or to palpation, no wheezing, no rhonchi, no rales, air entry is diminished, no accessory respiratory muscle use, no chest asymmetry, normal excursion.   GI:  bowel sound positive in all four quadrants, abdomen is soft, non-tender, non-distended, no hepatosple

## 2017-06-12 NOTE — PAYOR COMM NOTE
--------------  CONTINUED STAY REVIEW    Payor: [id number] Number: [id number]    Admit date: 6/2/2017  5:15 PM       Admitting Physician: Olu Jaime MD  Attending Physician:  Kristen Echols MD  Primary Care Physician: Sultana Hubbard

## 2017-06-20 ENCOUNTER — OFFICE VISIT (OUTPATIENT)
Dept: FAMILY MEDICINE CLINIC | Facility: CLINIC | Age: 67
End: 2017-06-20

## 2017-06-20 VITALS
DIASTOLIC BLOOD PRESSURE: 62 MMHG | SYSTOLIC BLOOD PRESSURE: 112 MMHG | HEART RATE: 69 BPM | RESPIRATION RATE: 14 BRPM | TEMPERATURE: 99 F | HEIGHT: 64 IN | WEIGHT: 290 LBS | BODY MASS INDEX: 49.51 KG/M2 | OXYGEN SATURATION: 100 %

## 2017-06-20 DIAGNOSIS — D72.829 LEUKOCYTOSIS, UNSPECIFIED TYPE: ICD-10-CM

## 2017-06-20 DIAGNOSIS — E11.9 CONTROLLED TYPE 2 DIABETES MELLITUS WITHOUT COMPLICATION, UNSPECIFIED LONG TERM INSULIN USE STATUS: ICD-10-CM

## 2017-06-20 DIAGNOSIS — R09.02 HYPOXIA: ICD-10-CM

## 2017-06-20 DIAGNOSIS — I10 ESSENTIAL HYPERTENSION: ICD-10-CM

## 2017-06-20 DIAGNOSIS — E78.00 PURE HYPERCHOLESTEROLEMIA: ICD-10-CM

## 2017-06-20 DIAGNOSIS — E66.01 MORBID OBESITY, UNSPECIFIED OBESITY TYPE (HCC): ICD-10-CM

## 2017-06-20 DIAGNOSIS — R06.00 DYSPNEA ON EXERTION: Primary | ICD-10-CM

## 2017-06-20 PROCEDURE — 99214 OFFICE O/P EST MOD 30 MIN: CPT | Performed by: FAMILY MEDICINE

## 2017-06-20 PROCEDURE — 90471 IMMUNIZATION ADMIN: CPT | Performed by: FAMILY MEDICINE

## 2017-06-20 PROCEDURE — 90670 PCV13 VACCINE IM: CPT | Performed by: FAMILY MEDICINE

## 2017-06-20 NOTE — PROGRESS NOTES
HPI:    Patient ID: Myesha Johnson is a 77year old female.     HPI  Patient is here for follow-up of Controlled type 2 diabetes mellitus without complication, unspecified long term insulin use status ([facility]  (primary encounter diagnosis)  Leukocytosis, unspe lesions.    Cardiac:  S1 S2 regular rate and rhythm, no murmur, gallop, or rub  Respiratory:  Bilaterally clear to auscultation, no chest tenderness to palpation, no wheezing, no rhonchi, no rales, air entry is adequate, no accessory respiratory muscle use,

## 2017-06-26 ENCOUNTER — TELEPHONE (OUTPATIENT)
Dept: FAMILY MEDICINE CLINIC | Facility: CLINIC | Age: 67
End: 2017-06-26

## 2017-06-26 NOTE — TELEPHONE ENCOUNTER
[facility] is requesting a letter regarding pt's oxygen tank and compressor. Pt states she no longer needs it but they will not pick it up until they receive a letter from Dr. Emilee Singh stating it may be picked up.      Fax# [phone]

## 2017-06-30 ENCOUNTER — MED REC SCAN ONLY (OUTPATIENT)
Dept: FAMILY MEDICINE CLINIC | Facility: CLINIC | Age: 67
End: 2017-06-30

## 2017-09-10 DIAGNOSIS — E11.9 CONTROLLED TYPE 2 DIABETES MELLITUS WITHOUT COMPLICATION, UNSPECIFIED LONG TERM INSULIN USE STATUS: Primary | ICD-10-CM

## 2017-09-11 RX ORDER — PRAVASTATIN SODIUM 40 MG
TABLET ORAL
Qty: 90 TABLET | Refills: 1 | Status: SHIPPED | OUTPATIENT
Start: 2017-09-11 | End: 2018-03-26

## 2017-09-11 RX ORDER — LISINOPRIL 10 MG/1
10 TABLET ORAL DAILY
Qty: 90 TABLET | Refills: 1 | Status: SHIPPED | OUTPATIENT
Start: 2017-09-11 | End: 2018-03-26

## 2017-09-11 NOTE — TELEPHONE ENCOUNTER
From: Myesha Johnson  Sent: 9/10/2017 8:17 AM CDT  Subject: Medication Renewal Request    Myesha Johnson would like a refill of the following medications:  lisinopril 10 MG Oral Tab Piter Cr MD]  Pravastatin Sodium 40 MG Oral Tab Piter Cr MD

## 2017-09-21 LAB
ALBUMIN/GLOBULIN RATIO: 1.3 (CALC) (ref 1–2.5)
ALBUMIN: 3.6 G/DL (ref 3.6–5.1)
ALKALINE PHOSPHATASE: 56 U/L (ref 33–130)
ALT: 10 U/L (ref 6–29)
AST: 9 U/L (ref 10–35)
BILIRUBIN, TOTAL: 0.3 MG/DL (ref 0.2–1.2)
BUN: 16 MG/DL (ref 7–25)
CALCIUM: 8.8 MG/DL (ref 8.6–10.4)
CARBON DIOXIDE: 29 MMOL/L (ref 20–31)
CHLORIDE: 101 MMOL/L (ref 98–110)
CREATININE: 0.81 MG/DL (ref 0.5–0.99)
EGFR IF AFRICN AM: 87 ML/MIN/1.73M2
EGFR IF NONAFRICN AM: 75 ML/MIN/1.73M2
GLOBULIN: 2.8 G/DL (CALC) (ref 1.9–3.7)
GLUCOSE: 163 MG/DL (ref 65–99)
HEMOGLOBIN A1C: 6.8 % OF TOTAL HGB
POTASSIUM: 4.9 MMOL/L (ref 3.5–5.3)
PROTEIN, TOTAL: 6.4 G/DL (ref 6.1–8.1)
SODIUM: 140 MMOL/L (ref 135–146)

## 2017-09-22 ENCOUNTER — OFFICE VISIT (OUTPATIENT)
Dept: FAMILY MEDICINE CLINIC | Facility: CLINIC | Age: 67
End: 2017-09-22

## 2017-09-22 VITALS
BODY MASS INDEX: 50.02 KG/M2 | HEART RATE: 72 BPM | RESPIRATION RATE: 16 BRPM | SYSTOLIC BLOOD PRESSURE: 122 MMHG | DIASTOLIC BLOOD PRESSURE: 60 MMHG | WEIGHT: 293 LBS | HEIGHT: 64 IN | TEMPERATURE: 98 F

## 2017-09-22 DIAGNOSIS — E11.9 CONTROLLED TYPE 2 DIABETES MELLITUS WITHOUT COMPLICATION, UNSPECIFIED LONG TERM INSULIN USE STATUS: Primary | ICD-10-CM

## 2017-09-22 PROCEDURE — 99213 OFFICE O/P EST LOW 20 MIN: CPT | Performed by: FAMILY MEDICINE

## 2017-09-22 RX ORDER — FLUOXETINE HYDROCHLORIDE 20 MG/1
20 CAPSULE ORAL EVERY MORNING
Qty: 30 CAPSULE | Refills: 0 | Status: SHIPPED | OUTPATIENT
Start: 2017-09-22 | End: 2017-10-20

## 2017-09-25 NOTE — PROGRESS NOTES
HPI:    Patient ID: Roberto Tapia is a 79year old female. HPI  Patient is here for follow-up of diabetes. She feels well and has no complaints.   Review of Systems  Negative except for the above       Current Outpatient Prescriptions:  FLUoxetine HCl 2 positive in all four quadrants, abdomen is soft, non-tender, non-distended, no hepatosplenomegaly, no abnormal aortic pulsation.             ASSESSMENT/PLAN:   Controlled type 2 diabetes mellitus without complication, unspecified long term insulin use statu

## 2017-10-06 ENCOUNTER — TELEPHONE (OUTPATIENT)
Dept: FAMILY MEDICINE CLINIC | Facility: CLINIC | Age: 67
End: 2017-10-06

## 2017-10-06 NOTE — TELEPHONE ENCOUNTER
Pt had labs done on 09/20/17. You informed pt to have them repeated in 6 months and follow up. Is that still the plan?

## 2017-10-20 PROBLEM — R45.4 IRRITABILITY AND ANGER: Status: ACTIVE | Noted: 2017-10-20

## 2017-10-20 NOTE — PROGRESS NOTES
HPI:    Patient ID: Heather Perera is a 79year old female. HPI  Follow-up of irritability and anger. She has started Prozac last visit 20 mg daily in the morning.   She is sleeping better now and her mood is much better in terms of irritability and rob abnormal aortic pulsation. PSYCH: Mood and affect are normal.  Judgement and insight are intact. No suicidal thoughts. ASSESSMENT/PLAN:   Irritability and anger  (primary encounter diagnosis)  Continue fluoxetine 20 mg daily.   Risk and benefit

## 2017-11-28 ENCOUNTER — OFFICE VISIT (OUTPATIENT)
Dept: FAMILY MEDICINE CLINIC | Facility: CLINIC | Age: 67
End: 2017-11-28

## 2017-11-28 VITALS
BODY MASS INDEX: 49.17 KG/M2 | DIASTOLIC BLOOD PRESSURE: 80 MMHG | SYSTOLIC BLOOD PRESSURE: 114 MMHG | WEIGHT: 288 LBS | OXYGEN SATURATION: 99 % | HEIGHT: 64 IN | TEMPERATURE: 99 F | HEART RATE: 64 BPM | RESPIRATION RATE: 14 BRPM

## 2017-11-28 DIAGNOSIS — R05.8 COUGH PRODUCTIVE OF PURULENT SPUTUM: Primary | ICD-10-CM

## 2017-11-28 PROCEDURE — 99213 OFFICE O/P EST LOW 20 MIN: CPT | Performed by: FAMILY MEDICINE

## 2017-11-28 RX ORDER — AMOXICILLIN AND CLAVULANATE POTASSIUM 875; 125 MG/1; MG/1
1 TABLET, FILM COATED ORAL 2 TIMES DAILY
Qty: 20 TABLET | Refills: 0 | Status: SHIPPED | OUTPATIENT
Start: 2017-11-28 | End: 2017-12-08

## 2017-11-28 RX ORDER — CODEINE PHOSPHATE AND GUAIFENESIN 10; 100 MG/5ML; MG/5ML
5 SOLUTION ORAL EVERY 6 HOURS PRN
Qty: 118 ML | Refills: 0 | Status: SHIPPED | OUTPATIENT
Start: 2017-11-28 | End: 2018-03-10

## 2017-12-09 ENCOUNTER — OFFICE VISIT (OUTPATIENT)
Dept: FAMILY MEDICINE CLINIC | Facility: CLINIC | Age: 67
End: 2017-12-09

## 2017-12-09 VITALS
BODY MASS INDEX: 49.17 KG/M2 | SYSTOLIC BLOOD PRESSURE: 112 MMHG | WEIGHT: 288 LBS | TEMPERATURE: 99 F | RESPIRATION RATE: 12 BRPM | HEIGHT: 64 IN | OXYGEN SATURATION: 100 % | DIASTOLIC BLOOD PRESSURE: 68 MMHG | HEART RATE: 78 BPM

## 2017-12-09 DIAGNOSIS — R05.8 PRODUCTIVE COUGH: Primary | ICD-10-CM

## 2017-12-09 DIAGNOSIS — E55.9 VITAMIN D DEFICIENCY: ICD-10-CM

## 2017-12-09 PROCEDURE — 99213 OFFICE O/P EST LOW 20 MIN: CPT | Performed by: FAMILY MEDICINE

## 2017-12-09 NOTE — PROGRESS NOTES
HPI:    Patient ID: Ninoska Shane is a 79year old female. HPI  Patient is here for follow-up of productive cough. She is significantly better and has no symptoms now. She finished a course of Augmentin which worked really well.   She did not require t excursion. GI:  bowel sound positive in all four quadrants, abdomen is soft, non-tender, non-distended, no hepatosplenomegaly, no abnormal aortic pulsation.             ASSESSMENT/PLAN:   Productive cough  (primary encounter diagnosis)  Cough has resolved

## 2018-01-17 DIAGNOSIS — R45.4 IRRITABILITY AND ANGER: ICD-10-CM

## 2018-01-17 RX ORDER — FLUOXETINE HYDROCHLORIDE 20 MG/1
20 CAPSULE ORAL DAILY
Qty: 30 CAPSULE | Refills: 0 | Status: SHIPPED | OUTPATIENT
Start: 2018-01-17 | End: 2018-03-10

## 2018-01-17 NOTE — TELEPHONE ENCOUNTER
Requesting Fluoxetine   LOV: 12/9/17  RTC: 3 months   Last Relevant Labs: n/a   Filled: 10/20/17 #90 with 0 refills    No future appointments. Med refilled for 30 days, one time only exception.  Sig updated to reflect patient will be due for f/u in March

## 2018-01-24 NOTE — TELEPHONE ENCOUNTER
Requested Medications   MetFORMIN HCl Oral Tablet 850 MG  Will file in chart as: METFORMIN  MG Oral Tab  TAKE 1 TABLET BY MOUTH TWO TIMES A DAY WITH MEALS  Disp: 180 tablet (Pharmacy requested 180)   Refills: 0     Class: Normal Start: 1/23/2018   D

## 2018-01-27 DIAGNOSIS — R45.4 IRRITABILITY AND ANGER: ICD-10-CM

## 2018-01-29 NOTE — TELEPHONE ENCOUNTER
Requested Medications   FLUoxetine HCl Oral Capsule 20 MG  Will file in chart as: FLUOXETINE HCL 20 MG Oral Cap  The source prescription was discontinued on 1/17/2018 by Carolina Rondon RN.   TAKE 1 CAPSULE BY MOUTH IN THE MORNING        Disp: 30 capsule (P

## 2018-02-13 RX ORDER — FLUOXETINE HYDROCHLORIDE 20 MG/1
20 CAPSULE ORAL DAILY
Qty: 30 CAPSULE | Refills: 0 | Status: SHIPPED | OUTPATIENT
Start: 2018-02-13 | End: 2018-03-16

## 2018-02-13 NOTE — TELEPHONE ENCOUNTER
No future appointments. Patient due for f/u in March. Will give x1 exception until that time. Med refilled for 30 days, one time only exception.

## 2018-02-15 DIAGNOSIS — R45.4 IRRITABILITY AND ANGER: ICD-10-CM

## 2018-02-15 RX ORDER — FLUOXETINE HYDROCHLORIDE 20 MG/1
CAPSULE ORAL
Qty: 30 CAPSULE | Refills: 0 | OUTPATIENT
Start: 2018-02-15

## 2018-02-15 NOTE — TELEPHONE ENCOUNTER
Requested Medications   FLUoxetine HCl Oral Capsule 20 MG  Will file in chart as: FLUOXETINE HCL 20 MG Oral Cap  TAKE 1 CAPSULE BY MOUTH ONE TIME A DAY        Disp: 30 capsule (Pharmacy requested 30)   Refills: 0     Class: Normal Start: 2/15/2018   For: I

## 2018-02-18 ENCOUNTER — PATIENT MESSAGE (OUTPATIENT)
Dept: FAMILY MEDICINE CLINIC | Facility: CLINIC | Age: 68
End: 2018-02-18

## 2018-02-19 NOTE — TELEPHONE ENCOUNTER
From: Roberto Tapia  To: Tg Pace MD  Sent: 2/18/2018 12:08 PM CST  Subject: Other    I need to schedule my A1c Test with [facility] in March, can you send the order over to [facility] and let me know when you send it so I can make an appt.  with [facility].    Th

## 2018-03-04 LAB
ALBUMIN/GLOBULIN RATIO: 1.5 (CALC) (ref 1–2.5)
ALBUMIN: 4 G/DL (ref 3.6–5.1)
ALKALINE PHOSPHATASE: 54 U/L (ref 33–130)
ALT: 14 U/L (ref 6–29)
AST: 10 U/L (ref 10–35)
BILIRUBIN, TOTAL: 0.4 MG/DL (ref 0.2–1.2)
BUN: 20 MG/DL (ref 7–25)
CALCIUM: 9.5 MG/DL (ref 8.6–10.4)
CARBON DIOXIDE: 32 MMOL/L (ref 20–31)
CHLORIDE: 99 MMOL/L (ref 98–110)
CHOL/HDLC RATIO: 3 (CALC)
CHOLESTEROL, TOTAL: 162 MG/DL
CREATININE: 0.81 MG/DL (ref 0.5–0.99)
EGFR IF AFRICN AM: 87 ML/MIN/1.73M2
EGFR IF NONAFRICN AM: 75 ML/MIN/1.73M2
GLOBULIN: 2.7 G/DL (CALC) (ref 1.9–3.7)
GLUCOSE: 147 MG/DL (ref 65–99)
HDL CHOLESTEROL: 54 MG/DL
HEMOGLOBIN A1C: 6.5 % OF TOTAL HGB
LDL-CHOLESTEROL: 83 MG/DL (CALC)
NON-HDL CHOLESTEROL: 108 MG/DL (CALC)
POTASSIUM: 5.2 MMOL/L (ref 3.5–5.3)
PROTEIN, TOTAL: 6.7 G/DL (ref 6.1–8.1)
SODIUM: 142 MMOL/L (ref 135–146)
TRIGLYCERIDES: 150 MG/DL

## 2018-03-05 LAB — VITAMIN D, 25-OH, TOTAL: 21 NG/ML (ref 30–100)

## 2018-03-10 ENCOUNTER — OFFICE VISIT (OUTPATIENT)
Dept: FAMILY MEDICINE CLINIC | Facility: CLINIC | Age: 68
End: 2018-03-10

## 2018-03-10 VITALS
TEMPERATURE: 99 F | DIASTOLIC BLOOD PRESSURE: 60 MMHG | OXYGEN SATURATION: 96 % | BODY MASS INDEX: 48 KG/M2 | SYSTOLIC BLOOD PRESSURE: 118 MMHG | HEART RATE: 60 BPM | WEIGHT: 280 LBS

## 2018-03-10 DIAGNOSIS — E78.00 PURE HYPERCHOLESTEROLEMIA: ICD-10-CM

## 2018-03-10 DIAGNOSIS — Z13.31 NEGATIVE DEPRESSION SCREENING: ICD-10-CM

## 2018-03-10 DIAGNOSIS — E55.9 VITAMIN D DEFICIENCY: ICD-10-CM

## 2018-03-10 DIAGNOSIS — E66.01 MORBID OBESITY (HCC): ICD-10-CM

## 2018-03-10 DIAGNOSIS — I10 ESSENTIAL HYPERTENSION: ICD-10-CM

## 2018-03-10 DIAGNOSIS — E11.9 CONTROLLED TYPE 2 DIABETES MELLITUS WITHOUT COMPLICATION, UNSPECIFIED LONG TERM INSULIN USE STATUS: Primary | ICD-10-CM

## 2018-03-10 DIAGNOSIS — Z12.39 SCREENING FOR MALIGNANT NEOPLASM OF BREAST: ICD-10-CM

## 2018-03-10 PROCEDURE — 99214 OFFICE O/P EST MOD 30 MIN: CPT | Performed by: FAMILY MEDICINE

## 2018-03-10 NOTE — PROGRESS NOTES
HPI:    Patient ID: Irma Kaba is a 79year old female. HPI   Patient is here for follow-up of diabetes, obesity, high blood pressure, high cholesterol, low vitamin D. She feels well and has no complaints.   Review of Systems  Except for the above, a Negative depression screening  (primary encounter diagnosis)  Screening for malignant neoplasm of breast  Diabetes    Procedures    Last refreshed: 3/10/2018 11:30 AM:  Last eye exam date (proc/flow) 3/9/2017       Last refreshed: 3/10/2018 11:30 AM:  Seward Goodell Relevant Medication Classes    Prescribed ACE inhibitor ,  Prescribed antihypertensives ,  Prescribed oral antidiabetics ,  Prescribed statins      Immunizations    Last refreshed: 3/10/2018 11:30 AM:  Influenza vaccination 9/7/2017       Last refreshed:

## 2018-03-16 DIAGNOSIS — R45.4 IRRITABILITY AND ANGER: ICD-10-CM

## 2018-03-16 RX ORDER — FLUOXETINE HYDROCHLORIDE 20 MG/1
CAPSULE ORAL
Qty: 30 CAPSULE | Refills: 0 | Status: SHIPPED | OUTPATIENT
Start: 2018-03-16 | End: 2018-04-16

## 2018-03-19 NOTE — TELEPHONE ENCOUNTER
Requested Medications   MetFORMIN HCl Oral Tablet 850 MG  Will file in chart as: METFORMIN  MG Oral Tab  The source prescription was discontinued on 1/24/2018 by Camila Weir, [address] [place] [address] Ave.   TAKE 1 TABLET BY MOUTH TWO TIMES A DAY WITH MEALS  Disp: 180 tab

## 2018-03-24 ENCOUNTER — HOSPITAL (OUTPATIENT)
Dept: OTHER | Age: 68
End: 2018-03-24
Attending: FAMILY MEDICINE

## 2018-03-26 ENCOUNTER — TELEPHONE (OUTPATIENT)
Dept: FAMILY MEDICINE CLINIC | Facility: CLINIC | Age: 68
End: 2018-03-26

## 2018-03-26 RX ORDER — LISINOPRIL 10 MG/1
10 TABLET ORAL DAILY
Qty: 90 TABLET | Refills: 1 | Status: SHIPPED | OUTPATIENT
Start: 2018-03-26 | End: 2018-05-20

## 2018-03-26 RX ORDER — PRAVASTATIN SODIUM 40 MG
TABLET ORAL
Qty: 90 TABLET | Refills: 1 | Status: SHIPPED | OUTPATIENT
Start: 2018-03-26 | End: 2018-05-20

## 2018-03-26 NOTE — TELEPHONE ENCOUNTER
Requesting Lisinopril and Pravastatin  LOV: 3/10/18  RTC: 6 months  Last Relevant Labs: 3/3/18  Filled: 9/11/17 #90 with 1 refills both meds    PP - refilled    No future appointments.

## 2018-03-26 NOTE — TELEPHONE ENCOUNTER
Received refill request from [facility] for Lisinopril Oral Tablet 10 mg for quantity of 90, and for Pravastatin Sodium Oral Tablet 40 mg for quantity of 90.

## 2018-03-29 NOTE — TELEPHONE ENCOUNTER
Medication Detail      Disp Refills Start End    METFORMIN  MG Oral Tab 180 tablet 0 3/19/2018     Sig: TAKE 1 TABLET BY MOUTH TWO TIMES A DAY WITH MEALS    E-Prescribing Status: Receipt confirmed by pharmacy (3/19/2018  9:54 AM CDT)    Pharmacy

## 2018-04-10 ENCOUNTER — TELEPHONE (OUTPATIENT)
Dept: FAMILY MEDICINE CLINIC | Facility: CLINIC | Age: 68
End: 2018-04-10

## 2018-04-10 NOTE — TELEPHONE ENCOUNTER
Received mammogram results that pt had done at [facility]. Mammogram is normal and [employer] message sent for pt to repeat in 1 year and also to get DM eye report information.

## 2018-04-16 DIAGNOSIS — R45.4 IRRITABILITY AND ANGER: ICD-10-CM

## 2018-04-16 RX ORDER — FLUOXETINE HYDROCHLORIDE 20 MG/1
CAPSULE ORAL
Qty: 30 CAPSULE | Refills: 5 | Status: SHIPPED | OUTPATIENT
Start: 2018-04-16 | End: 2018-09-10

## 2018-04-16 NOTE — TELEPHONE ENCOUNTER
Requested Medications   FLUoxetine HCl Oral Capsule 20 MG  Will file in chart as: FLUOXETINE HCL 20 MG Oral Cap  TAKE 1 CAPSULE BY MOUTH ONE TIME A DAY        Disp: 30 capsule (Pharmacy requested 30)   Refills: 0     Class: Normal Start: 4/16/2018   For: I

## 2018-04-17 ENCOUNTER — PATIENT MESSAGE (OUTPATIENT)
Dept: FAMILY MEDICINE CLINIC | Facility: CLINIC | Age: 68
End: 2018-04-17

## 2018-04-18 NOTE — TELEPHONE ENCOUNTER
From: Myesha Johnson  To: Stehp Gould MD  Sent: 4/17/2018 8:34 PM CDT  Subject: Other    My eyes were examined and dilated at the same location as last year. I asked them to send you the results. They said my eyes looked good no problem.

## 2018-04-19 NOTE — PROGRESS NOTES
Patient had diabetic retinopathy exam on 3/22/18 with Dr. Adam Olguin. No diabetic retinopathy noted. flowsheet updated.

## 2018-04-21 DIAGNOSIS — R45.4 IRRITABILITY AND ANGER: ICD-10-CM

## 2018-04-21 RX ORDER — FLUOXETINE HYDROCHLORIDE 20 MG/1
CAPSULE ORAL
Qty: 30 CAPSULE | Refills: 5
Start: 2018-04-21

## 2018-05-09 ENCOUNTER — PATIENT MESSAGE (OUTPATIENT)
Dept: FAMILY MEDICINE CLINIC | Facility: CLINIC | Age: 68
End: 2018-05-09

## 2018-05-09 NOTE — TELEPHONE ENCOUNTER
Initial prescription was given 9/22/17. Pt informed via [employer]    Medication Quantity Refills Start End   FLUoxetine HCl 20 MG Oral Cap (Discontinued) 30 capsule 0 9/22/2017 10/20/2017   Sig :  Take 1 capsule (20 mg total) by mouth every morning.      Rout

## 2018-05-09 NOTE — TELEPHONE ENCOUNTER
From: Zhane Kennedy  To: Katie Vanegas MD  Sent: 5/9/2018 4:11 PM CDT  Subject: Prescription Question    Can you tell me when I was first prescribed Fluoxetine?        Thank You

## 2018-05-10 ENCOUNTER — PATIENT MESSAGE (OUTPATIENT)
Dept: FAMILY MEDICINE CLINIC | Facility: CLINIC | Age: 68
End: 2018-05-10

## 2018-05-10 NOTE — TELEPHONE ENCOUNTER
From: Ninoska Shane  To: Juan José Burleson MD  Sent: 5/10/2018 8:22 AM CDT  Subject: Other    Does Dr. Larry Cleaning accept [facility]?   Thank Humaira Walker

## 2018-05-16 ENCOUNTER — PATIENT MESSAGE (OUTPATIENT)
Dept: FAMILY MEDICINE CLINIC | Facility: CLINIC | Age: 68
End: 2018-05-16

## 2018-05-16 NOTE — TELEPHONE ENCOUNTER
Requesting Accu-chek smartview strips  LOV: 3/10/18  RTC: 6 mos  Last Labs: 3/3/18 a1c 6.5  Filled: 3/20/17#100 with 3 refills    No future appointments.

## 2018-05-16 NOTE — TELEPHONE ENCOUNTER
From: Loretta Pena  To: Lila Lopez MD  Sent: 5/16/2018 2:01 PM CDT  Subject: Prescription Question    Can I get a prescription refill for the Accu-Chek Smart View test strips?   Thank You  Swati Hodges

## 2018-05-20 DIAGNOSIS — R45.4 IRRITABILITY AND ANGER: ICD-10-CM

## 2018-05-20 RX ORDER — FLUOXETINE HYDROCHLORIDE 20 MG/1
CAPSULE ORAL
Qty: 30 CAPSULE | Refills: 5 | Status: CANCELLED
Start: 2018-05-20

## 2018-05-21 ENCOUNTER — TELEPHONE (OUTPATIENT)
Dept: FAMILY MEDICINE CLINIC | Facility: CLINIC | Age: 68
End: 2018-05-21

## 2018-05-21 NOTE — TELEPHONE ENCOUNTER
Future Appointments  Date Time Provider Baldev Chloe   5/22/2018 3:15 PM Ismael Che MD [facility] [address]

## 2018-05-21 NOTE — TELEPHONE ENCOUNTER
From: Shey Gore  Sent: 5/20/2018 1:40 PM CDT  Subject: Medication Renewal Request    Shey Gore would like a refill of the following medications:     METFORMIN  MG Oral Tab Joseluis Bray MD]     lisinopril 10 MG Oral Tab Joseluis Bray,

## 2018-05-21 NOTE — TELEPHONE ENCOUNTER
Patient called stated she quit her job after 28 years - walked out from the stress of the job on 05-09-18. She stated that Dr. Rachele Blum put her on medication for the stress - Fluoxetine.  She has filed for unemployment and the agency advised her she would [employer]

## 2018-05-21 NOTE — TELEPHONE ENCOUNTER
I called the patient and left a detailed message to call and schedule an OV per MD request in order to document supporting info for job stress

## 2018-05-21 NOTE — TELEPHONE ENCOUNTER
Future Appointments  Date Time Provider Baldev Corona   5/22/2018 3:15 PM Deejay Desai MD [facility] [address]

## 2018-05-21 NOTE — TELEPHONE ENCOUNTER
Patent needs OV for this. If we are going to provide a letter to unemployment to address her stress and its effects on her job we need this to be documented in an office visit.

## 2018-05-22 ENCOUNTER — OFFICE VISIT (OUTPATIENT)
Dept: FAMILY MEDICINE CLINIC | Facility: CLINIC | Age: 68
End: 2018-05-22

## 2018-05-22 VITALS
OXYGEN SATURATION: 100 % | DIASTOLIC BLOOD PRESSURE: 68 MMHG | WEIGHT: 278.5 LBS | SYSTOLIC BLOOD PRESSURE: 118 MMHG | HEART RATE: 63 BPM | BODY MASS INDEX: 46.4 KG/M2 | HEIGHT: 65 IN | TEMPERATURE: 98 F | RESPIRATION RATE: 12 BRPM

## 2018-05-22 DIAGNOSIS — Z56.6 STRESS AT WORK: ICD-10-CM

## 2018-05-22 DIAGNOSIS — R51.9 SINUS HEADACHE: ICD-10-CM

## 2018-05-22 DIAGNOSIS — F32.A DEPRESSION, UNSPECIFIED DEPRESSION TYPE: Primary | ICD-10-CM

## 2018-05-22 DIAGNOSIS — F41.9 ANXIETY: ICD-10-CM

## 2018-05-22 DIAGNOSIS — H65.91 FLUID LEVEL BEHIND TYMPANIC MEMBRANE OF RIGHT EAR: ICD-10-CM

## 2018-05-22 PROCEDURE — 99214 OFFICE O/P EST MOD 30 MIN: CPT | Performed by: FAMILY MEDICINE

## 2018-05-22 RX ORDER — LISINOPRIL 10 MG/1
10 TABLET ORAL DAILY
Qty: 90 TABLET | Refills: 1 | Status: SHIPPED
Start: 2018-05-22 | End: 2018-11-26

## 2018-05-22 RX ORDER — PRAVASTATIN SODIUM 40 MG
TABLET ORAL
Qty: 90 TABLET | Refills: 1 | Status: SHIPPED
Start: 2018-05-22 | End: 2018-09-29

## 2018-05-22 SDOH — HEALTH STABILITY - MENTAL HEALTH: OTHER PHYSICAL AND MENTAL STRAIN RELATED TO WORK: Z56.6

## 2018-05-22 NOTE — PROGRESS NOTES
HPI:    Patient ID: Torsten Escobedo is a 79year old female. HPI  Patient states due to the ongoing stress level that she has been experiencing from her current job she had to stop going to her job starting on 5/9/18.   Patient is taking fluoxetine due to tenderness to palpation, no wheezing, no rhonchi, no rales, air entry is adequate, no accessory respiratory muscle use, no chest asymmetry, normal excursion.   GI:  bowel sound positive in all four quadrants, abdomen is soft, non-tender, non-distended, no h

## 2018-05-24 NOTE — TELEPHONE ENCOUNTER
Requesting Metformin  LOV: 5/22/18  RTC: one month  Last Relevant Labs: 3/3/18  Filled: 5/22/18 #180 with 0 refills - receipt confirmed at pharmacy requesting refill. Denied with note as such    No future appointments.

## 2018-05-29 NOTE — TELEPHONE ENCOUNTER
Requested Medications   MetFORMIN HCl Oral Tablet 850 MG  Will file in chart as: METFORMIN  MG Oral Tab  The source prescription was reordered on 5/22/2018 by Luna Bloch, [address]   TAKE 1 TABLET BY MOUTH TWO TIMES A DAY WITH MEALS  Disp: 180 tablet

## 2018-06-26 NOTE — TELEPHONE ENCOUNTER
Requested Medications   MetFORMIN HCl Oral Tablet 850 MG  Will file in chart as: METFORMIN  MG Oral Tab  The source prescription was reordered on 5/22/2018 by Radha Gonzalez [address] [place] [address] [place].   TAKE 1 TABLET BY MOUTH TWO TIMES A DAY WITH MEALS  Disp: 180 tablet

## 2018-07-26 NOTE — TELEPHONE ENCOUNTER
Patient will need refills on Atorvastatin and Lisinopril sent to the [facility] in [facility].   Future Appointments  Date Time Provider Baldev Corona   9/10/2018 9:00 AM Deejay Desai MD [facility] [address]     She will have labs done prior to her brandon

## 2018-07-26 NOTE — TELEPHONE ENCOUNTER
Patient does not have Atorvastatin listed on her med list. If the medication was supposed to be Pravastatin, that and the Lisinopril were sent to [facility] 5/22/18 for #90 with 1 refill.  Please encourage patients to check with the pharmacy or r

## 2018-07-26 NOTE — TELEPHONE ENCOUNTER
Requested Medications   MetFORMIN HCl Oral Tablet 850 MG  Will file in chart as: METFORMIN  MG Oral Tab  The source prescription was reordered on 5/22/2018 by Rossy Banks, [address] [place] [address] [place].   TAKE 1 TABLET BY MOUTH TWO TIMES A DAY WITH MEALS       Disp: 180 t

## 2018-08-01 LAB
ALBUMIN/GLOBULIN RATIO: 1.5 (CALC) (ref 1–2.5)
ALBUMIN: 3.7 G/DL (ref 3.6–5.1)
ALKALINE PHOSPHATASE: 47 U/L (ref 33–130)
ALT: 12 U/L (ref 6–29)
AST: 12 U/L (ref 10–35)
BILIRUBIN, TOTAL: 0.3 MG/DL (ref 0.2–1.2)
BUN/CREATININE RATIO: 26 (CALC) (ref 6–22)
BUN: 26 MG/DL (ref 7–25)
CALCIUM: 9.3 MG/DL (ref 8.6–10.4)
CARBON DIOXIDE: 34 MMOL/L (ref 20–31)
CHLORIDE: 101 MMOL/L (ref 98–110)
CHOL/HDLC RATIO: 3.2 (CALC)
CHOLESTEROL, TOTAL: 165 MG/DL
CREATININE: 1 MG/DL (ref 0.5–0.99)
EGFR IF AFRICN AM: 68 ML/MIN/1.73M2
EGFR IF NONAFRICN AM: 58 ML/MIN/1.73M2
GLOBULIN: 2.4 G/DL (CALC) (ref 1.9–3.7)
GLUCOSE: 136 MG/DL (ref 65–99)
HDL CHOLESTEROL: 52 MG/DL
HEMOGLOBIN A1C: 6.5 % OF TOTAL HGB
LDL-CHOLESTEROL: 90 MG/DL (CALC)
NON-HDL CHOLESTEROL: 113 MG/DL (CALC)
POTASSIUM: 5.4 MMOL/L (ref 3.5–5.3)
PROTEIN, TOTAL: 6.1 G/DL (ref 6.1–8.1)
SODIUM: 142 MMOL/L (ref 135–146)
TRIGLYCERIDES: 130 MG/DL
VITAMIN D, 25-OH, TOTAL: 27 NG/ML (ref 30–100)

## 2018-08-02 ENCOUNTER — PATIENT MESSAGE (OUTPATIENT)
Dept: FAMILY MEDICINE CLINIC | Facility: CLINIC | Age: 68
End: 2018-08-02

## 2018-08-02 NOTE — TELEPHONE ENCOUNTER
From: Farzaneh Castaneda  To: Stephon Wolf MD  Sent: 8/2/2018 11:50 AM CDT  Subject: Test Results Question    I went to [facility] and had my A1C test done on 7/31, my follow up doctor appt. is for 9/10 does it matter that I had my blood work done so early?

## 2018-08-04 ENCOUNTER — PATIENT MESSAGE (OUTPATIENT)
Dept: FAMILY MEDICINE CLINIC | Facility: CLINIC | Age: 68
End: 2018-08-04

## 2018-08-27 NOTE — TELEPHONE ENCOUNTER
Requesting Metformin  LOV: 5/22/18  RTC: one month  Last Relevant Labs: 7/31/18  Filled: 7/26/18 #180 with 0 refills  *receipt confirmed at pharmacy requesting, denied with note as such  Future Appointments  Date Time Provider Baldev Corona   9/10/2018

## 2018-09-04 RX ORDER — PRAVASTATIN SODIUM 40 MG
TABLET ORAL
Qty: 90 TABLET | Refills: 1
Start: 2018-09-04

## 2018-09-04 NOTE — TELEPHONE ENCOUNTER
From: Jammie Irizarry  Sent: 8/31/2018 8:27 PM CDT  Subject: Medication Renewal Request    Jammie Irizarry would like a refill of the following medications:     Pravastatin Sodium 40 MG Oral Tab Gorge Scruggs MD]    Preferred pharmacy: [id number]

## 2018-09-04 NOTE — TELEPHONE ENCOUNTER
Pravastatin Sodium 40 MG Oral Tab  TAKE 1 TABLET BY MOUTH NIGHTLY       Disp: 90 tablet Refills: 1    Class: Script not printed Start: 8/31/2018   Documented:3 years ago  Cholesterol Medication Protocol Passed9/4 8:52 AM   ALT < 80    ALT resulted within p

## 2018-09-10 ENCOUNTER — OFFICE VISIT (OUTPATIENT)
Dept: FAMILY MEDICINE CLINIC | Facility: CLINIC | Age: 68
End: 2018-09-10
Payer: MEDICARE

## 2018-09-10 VITALS
BODY MASS INDEX: 47 KG/M2 | DIASTOLIC BLOOD PRESSURE: 68 MMHG | HEART RATE: 68 BPM | OXYGEN SATURATION: 99 % | SYSTOLIC BLOOD PRESSURE: 118 MMHG | WEIGHT: 285 LBS | RESPIRATION RATE: 14 BRPM

## 2018-09-10 DIAGNOSIS — E55.9 VITAMIN D DEFICIENCY: ICD-10-CM

## 2018-09-10 DIAGNOSIS — E11.9 CONTROLLED TYPE 2 DIABETES MELLITUS WITHOUT COMPLICATION, UNSPECIFIED WHETHER LONG TERM INSULIN USE (HCC): Primary | ICD-10-CM

## 2018-09-10 DIAGNOSIS — R79.9 ABNORMAL BLOOD CHEMISTRY: ICD-10-CM

## 2018-09-10 PROCEDURE — 90732 PPSV23 VACC 2 YRS+ SUBQ/IM: CPT | Performed by: FAMILY MEDICINE

## 2018-09-10 PROCEDURE — G0008 ADMIN INFLUENZA VIRUS VAC: HCPCS | Performed by: FAMILY MEDICINE

## 2018-09-10 PROCEDURE — 99214 OFFICE O/P EST MOD 30 MIN: CPT | Performed by: FAMILY MEDICINE

## 2018-09-10 PROCEDURE — G0009 ADMIN PNEUMOCOCCAL VACCINE: HCPCS | Performed by: FAMILY MEDICINE

## 2018-09-10 PROCEDURE — 90653 IIV ADJUVANT VACCINE IM: CPT | Performed by: FAMILY MEDICINE

## 2018-09-10 NOTE — PROGRESS NOTES
HPI:    Patient ID: Shey Gore is a 76year old female.     HPI  Patient presents with:  Medication Follow-Up  Imm/Inj: pt is due for ppsv-23 and yearly influenza vaccination  Diabetes: pt is overdue for dm urine  Hyperlipidemia      Review of Systems  E Pneumococcal 23, Adult (Pneumovax) (41981) (Dx V03.82/Z23)      Diabetic foot and eye exam are up-to-date hemoglobin A1c well controlled blood pressure controlled.   Patient is on ACE inhibitor    Patient would like to get flu shot and second pneumococc Total cholesterol - Last refreshed: 10/11/2018  2:11 PM:  Tot chol View Report for additional information:  165 mg/dL  7/31/2018:  2mo ago         Last refreshed: 10/11/2018  2:11 PM:  Triglycerides View Report for additional information:  130 mg/dL  7/31/

## 2018-09-27 NOTE — TELEPHONE ENCOUNTER
Requested Medications      Name from pharmacy: MetFORMIN HCl Oral Tablet 850 MG         Will file in chart as: METFORMIN  MG Oral Tab    The source prescription was discontinued on 5/22/2018 by Kenyatta Melendez, [address] [place] [address] Ave.     Sig: TAKE 1 TABLET BY MOUTH TW

## 2018-10-03 RX ORDER — PRAVASTATIN SODIUM 40 MG
TABLET ORAL
Qty: 90 TABLET | Refills: 1 | Status: SHIPPED | OUTPATIENT
Start: 2018-10-03 | End: 2019-08-02

## 2018-10-03 NOTE — TELEPHONE ENCOUNTER
Requesting Pravastatin  LOV: 9/10/18  RTC: 3 months  Last Relevant Labs: 7/31/18  Filled: 5/22/18 #90 with 1 refills    Future Appointments   Date Time Provider Baldev Corona   1/14/2019  2:30 PM Yina Singh MD [facility] [address]     PP - refille

## 2018-10-12 ENCOUNTER — OFFICE VISIT (OUTPATIENT)
Dept: FAMILY MEDICINE CLINIC | Facility: CLINIC | Age: 68
End: 2018-10-12
Payer: MEDICARE

## 2018-10-12 VITALS
RESPIRATION RATE: 12 BRPM | TEMPERATURE: 98 F | HEART RATE: 69 BPM | HEIGHT: 65 IN | DIASTOLIC BLOOD PRESSURE: 68 MMHG | SYSTOLIC BLOOD PRESSURE: 122 MMHG | OXYGEN SATURATION: 100 % | WEIGHT: 283 LBS | BODY MASS INDEX: 47.15 KG/M2

## 2018-10-12 DIAGNOSIS — M77.52 LEFT ANKLE TENDONITIS: Primary | ICD-10-CM

## 2018-10-12 PROCEDURE — 99213 OFFICE O/P EST LOW 20 MIN: CPT | Performed by: FAMILY MEDICINE

## 2018-10-12 NOTE — PROGRESS NOTES
HPI:    Patient ID: Farzaneh Castaneda is a 76year old female. HPI  Patient is here with complaint of having left inner ankle pain on for the past 1-2 weeks. No trauma. She does have some swelling in that area as well. Seems to have less pain.   When she

## 2018-10-14 ENCOUNTER — PATIENT MESSAGE (OUTPATIENT)
Dept: FAMILY MEDICINE CLINIC | Facility: CLINIC | Age: 68
End: 2018-10-14

## 2018-10-15 ENCOUNTER — PATIENT OUTREACH (OUTPATIENT)
Dept: FAMILY MEDICINE CLINIC | Facility: CLINIC | Age: 68
End: 2018-10-15

## 2018-10-15 NOTE — TELEPHONE ENCOUNTER
From: Leah Maloney  To: Carmelina Marx MD  Sent: 10/14/2018 11:06 AM CDT  Subject: Visit Follow-up Question    I had an appt. /office visit in Sept. after I had my blood work/lab results done for my A1C.  The doctor sent me a letter 10/11/18 and asked me

## 2018-10-23 DIAGNOSIS — R45.4 IRRITABILITY AND ANGER: ICD-10-CM

## 2018-10-23 RX ORDER — FLUOXETINE HYDROCHLORIDE 20 MG/1
CAPSULE ORAL
Qty: 30 CAPSULE | Refills: 4 | OUTPATIENT
Start: 2018-10-23

## 2018-10-23 NOTE — TELEPHONE ENCOUNTER
Per LOV note on 10/12/18- pt discontinued fluoxetine and diclofenac was given for left ankle tendonitis.  Pt was to RTC in 1 week if not better  Future Appointments   Date Time Provider Baldev Corona   1/14/2019  2:30 PM James Serrato MD [facility]

## 2018-10-27 DIAGNOSIS — R45.4 IRRITABILITY AND ANGER: ICD-10-CM

## 2018-10-29 RX ORDER — FLUOXETINE HYDROCHLORIDE 20 MG/1
CAPSULE ORAL
Qty: 30 CAPSULE | Refills: 2 | Status: SHIPPED | OUTPATIENT
Start: 2018-10-29 | End: 2018-10-31

## 2018-10-29 NOTE — TELEPHONE ENCOUNTER
Requesting Metformin, Fluoxetine, diclofenac  LOV: 10/12/18  RTC: not noted  Last Relevant Labs: 7/31/18  Filled: 10/12/18 #30 with 0 refills Diclofenac  Filled: 9/27/18 #180 with 0 refills Metformin  Filled: 4/16/18 #30 with 5 refills  Fluoxetine    Futur

## 2018-10-31 ENCOUNTER — PATIENT MESSAGE (OUTPATIENT)
Dept: CASE MANAGEMENT | Age: 68
End: 2018-10-31

## 2018-10-31 NOTE — PROGRESS NOTES
Patients chart updated to reflect changes below. Also advised patient to contact her pharmacy to have them remove these meds from her profile to avoid automatic refill requests.

## 2018-10-31 NOTE — TELEPHONE ENCOUNTER
From: Cary Gross  To: Eboni Roman RN  Sent: 10/31/2018 10:09 AM CDT  Subject: Prescription Question    The pharmacy keeps refilling prescription's I no longer take, can you please make sure these do not get refilled.  The prescription's are: Fluoxe

## 2018-11-11 ENCOUNTER — PATIENT MESSAGE (OUTPATIENT)
Dept: FAMILY MEDICINE CLINIC | Facility: CLINIC | Age: 68
End: 2018-11-11

## 2018-11-12 NOTE — PROGRESS NOTES
Patient is having colonoscopy on 12/27/18, She would like to know if she should hold any of her medications prior to her procedure. Med list:  Medications   Current Medications as of November 12, 2018   Generic Name Strength Route/ Site Teressa Krishna

## 2018-11-12 NOTE — TELEPHONE ENCOUNTER
From: Angelina Reddy  To: Flavia Rowe MD  Sent: 11/11/2018 11:25 AM CST  Subject: Other    I need to have a blood test that Dr. Osmar Porter ordered to [facility] for a follow up appt. I have on Jan. 14, 2019 with the doctor.  Do I need to fast for this test?    I

## 2018-11-23 ENCOUNTER — MA CHART PREP (OUTPATIENT)
Dept: FAMILY MEDICINE CLINIC | Facility: CLINIC | Age: 68
End: 2018-11-23

## 2018-11-28 RX ORDER — LISINOPRIL 10 MG/1
TABLET ORAL
Qty: 90 TABLET | Refills: 1 | Status: SHIPPED | OUTPATIENT
Start: 2018-11-28 | End: 2019-06-22

## 2018-11-28 NOTE — TELEPHONE ENCOUNTER
Diabetic Medication Protocol Passed  Hypertension Medications Protocol Passed   Requesting METFORMIN  MG Oral Tab, LISINOPRIL 10 MG Oral Tab, and   LOV: 10/12/18  RTC: none specified   Last Labs: PP  Filled: Metformin 9/27/18 #180 with 0 refills  L

## 2018-12-24 RX ORDER — CHOLECALCIFEROL (VITAMIN D3) 50 MCG
TABLET ORAL DAILY
COMMUNITY
End: 2019-12-03

## 2018-12-27 ENCOUNTER — HOSPITAL ENCOUNTER (OUTPATIENT)
Facility: HOSPITAL | Age: 68
Setting detail: HOSPITAL OUTPATIENT SURGERY
Discharge: HOME | End: 2018-12-27
Attending: INTERNAL MEDICINE | Admitting: INTERNAL MEDICINE
Payer: MEDICARE

## 2018-12-27 ENCOUNTER — ANESTHESIA (OUTPATIENT)
Dept: ENDOSCOPY | Facility: HOSPITAL | Age: 68
End: 2018-12-27
Payer: MEDICARE

## 2018-12-27 ENCOUNTER — ANESTHESIA EVENT (OUTPATIENT)
Dept: ENDOSCOPY | Facility: HOSPITAL | Age: 68
End: 2018-12-27
Payer: MEDICARE

## 2018-12-27 DIAGNOSIS — Z86.010 PERSONAL HISTORY OF COLONIC POLYPS: ICD-10-CM

## 2018-12-27 DIAGNOSIS — K57.90 DIVERTICULOSIS: Primary | ICD-10-CM

## 2018-12-27 PROCEDURE — 0DJD8ZZ INSPECTION OF LOWER INTESTINAL TRACT, VIA NATURAL OR ARTIFICIAL OPENING ENDOSCOPIC: ICD-10-PCS | Performed by: INTERNAL MEDICINE

## 2018-12-27 PROCEDURE — 82962 GLUCOSE BLOOD TEST: CPT

## 2018-12-27 RX ORDER — SODIUM CHLORIDE, SODIUM LACTATE, POTASSIUM CHLORIDE, CALCIUM CHLORIDE 600; 310; 30; 20 MG/100ML; MG/100ML; MG/100ML; MG/100ML
INJECTION, SOLUTION INTRAVENOUS CONTINUOUS
Status: DISCONTINUED | OUTPATIENT
Start: 2018-12-27 | End: 2018-12-27

## 2018-12-27 RX ORDER — LIDOCAINE HYDROCHLORIDE 10 MG/ML
INJECTION, SOLUTION EPIDURAL; INFILTRATION; INTRACAUDAL; PERINEURAL AS NEEDED
Status: DISCONTINUED | OUTPATIENT
Start: 2018-12-27 | End: 2018-12-27 | Stop reason: SURG

## 2018-12-27 RX ORDER — SODIUM CHLORIDE 0.9 % (FLUSH) 0.9 %
10 SYRINGE (ML) INJECTION AS NEEDED
Status: DISCONTINUED | OUTPATIENT
Start: 2018-12-27 | End: 2018-12-27

## 2018-12-27 RX ORDER — MIDAZOLAM HYDROCHLORIDE 1 MG/ML
1 INJECTION INTRAMUSCULAR; INTRAVENOUS EVERY 5 MIN PRN
Status: DISCONTINUED | OUTPATIENT
Start: 2018-12-27 | End: 2018-12-27

## 2018-12-27 RX ADMIN — LIDOCAINE HYDROCHLORIDE 80 MG: 10 INJECTION, SOLUTION EPIDURAL; INFILTRATION; INTRACAUDAL; PERINEURAL at 12:59:00

## 2018-12-27 RX ADMIN — SODIUM CHLORIDE, SODIUM LACTATE, POTASSIUM CHLORIDE, CALCIUM CHLORIDE: 600; 310; 30; 20 INJECTION, SOLUTION INTRAVENOUS at 12:56:00

## 2018-12-27 RX ADMIN — SODIUM CHLORIDE, SODIUM LACTATE, POTASSIUM CHLORIDE, CALCIUM CHLORIDE: 600; 310; 30; 20 INJECTION, SOLUTION INTRAVENOUS at 13:09:00

## 2018-12-27 NOTE — OPERATIVE REPORT
COLONOSCOPY REPORT    Patient Name:  Derrick Tse Record #: [id number]  YOB: 1950  Date of Procedure: 12/27/2018    Referring physician: Columbus Rubinstein, MD    Surgeon:  Daniel Avilez.  Chino Monroy MD    Pre-op diagnosis: Screening: History o

## 2018-12-27 NOTE — H&P
RE-PROCEDURE UPDATE    HPI: Shey Gore is a 76year old female. 9/14/1950. Patient presents for a colonoscopy. ALLERGIES: No Known Allergies      No current outpatient medications on file.   Past Medical History:   Diagnosis Date   • Bronchopneumon

## 2018-12-27 NOTE — ANESTHESIA PREPROCEDURE EVALUATION
Anesthesia PreOp Note    HPI:     Heather Perera is a 76year old female who presents for preoperative consultation requested by: Jose Alejandro Conway MD    Date of Surgery: 12/27/2018    Procedure(s):  COLONOSCOPY  Indication: Personal history of colonic poly CHOLECYSTECTOMY     • COLONOSCOPY  2005, 5/17   • COLONOSCOPY N/A 5/1/2017    Performed by Yomi Pelletier MD at [facility] ENDOSCOPY   • HYSTERECTOMY  2000    partial hysteectomy         Medications Prior to Admission:  Cholecalciferol (VITAMIN D) 2000 units O 31.6      Smokeless tobacco: Never Used    Substance and Sexual Activity      Alcohol use: No        Alcohol/week: 0.0 oz      Drug use: No      Sexual activity: Not on file    Other Topics      Concerns:         Service: Not Asked        Blood Tra member of the nature of the anesthetic plan, benefits, risks including possible dental damage if relevant, major complications, and any alternative forms of anesthetic management. All of the patient's questions were answered to the best of my ability.  Sulema Barthel

## 2018-12-27 NOTE — ANESTHESIA POSTPROCEDURE EVALUATION
Patient: Cary Gross    Procedure Summary     Date:  12/27/18 Room / Location:  [facility] ENDOSCOPY 05 / [facility] ENDOSCOPY    Anesthesia Start:  8463 Anesthesia Stop:  5763    Procedure:  COLONOSCOPY (N/A ) Diagnosis:       Personal history of colonic polyps

## 2018-12-28 VITALS
SYSTOLIC BLOOD PRESSURE: 136 MMHG | RESPIRATION RATE: 20 BRPM | HEART RATE: 60 BPM | BODY MASS INDEX: 44.39 KG/M2 | OXYGEN SATURATION: 96 % | WEIGHT: 260 LBS | HEIGHT: 64 IN | DIASTOLIC BLOOD PRESSURE: 68 MMHG

## 2019-01-14 ENCOUNTER — OFFICE VISIT (OUTPATIENT)
Dept: FAMILY MEDICINE CLINIC | Facility: CLINIC | Age: 69
End: 2019-01-14
Payer: MEDICARE

## 2019-01-14 VITALS
WEIGHT: 287.5 LBS | DIASTOLIC BLOOD PRESSURE: 65 MMHG | HEART RATE: 65 BPM | OXYGEN SATURATION: 100 % | SYSTOLIC BLOOD PRESSURE: 130 MMHG | BODY MASS INDEX: 47.9 KG/M2 | RESPIRATION RATE: 14 BRPM | HEIGHT: 65 IN

## 2019-01-14 DIAGNOSIS — E66.01 MORBID OBESITY (HCC): ICD-10-CM

## 2019-01-14 DIAGNOSIS — E11.9 CONTROLLED TYPE 2 DIABETES MELLITUS WITHOUT COMPLICATION, UNSPECIFIED WHETHER LONG TERM INSULIN USE (HCC): ICD-10-CM

## 2019-01-14 DIAGNOSIS — Z00.00 ROUTINE ADULT HEALTH MAINTENANCE: Primary | ICD-10-CM

## 2019-01-14 DIAGNOSIS — Z12.39 BREAST CANCER SCREENING: ICD-10-CM

## 2019-01-14 DIAGNOSIS — E55.9 VITAMIN D DEFICIENCY: ICD-10-CM

## 2019-01-14 PROBLEM — E87.3 METABOLIC ALKALOSIS: Status: RESOLVED | Noted: 2017-06-02 | Resolved: 2019-01-14

## 2019-01-14 PROBLEM — M77.52 LEFT ANKLE TENDONITIS: Status: RESOLVED | Noted: 2018-10-12 | Resolved: 2019-01-14

## 2019-01-14 PROBLEM — D72.829 LEUKOCYTOSIS: Status: RESOLVED | Noted: 2017-06-02 | Resolved: 2019-01-14

## 2019-01-14 PROBLEM — E87.5 HYPERKALEMIA: Status: RESOLVED | Noted: 2017-06-02 | Resolved: 2019-01-14

## 2019-01-14 PROBLEM — R73.9 HYPERGLYCEMIA: Status: RESOLVED | Noted: 2017-06-02 | Resolved: 2019-01-14

## 2019-01-14 PROBLEM — Z91.81 AT RISK FOR FALLING: Status: RESOLVED | Noted: 2017-06-02 | Resolved: 2019-01-14

## 2019-01-14 PROBLEM — R09.02 HYPOXIA: Status: RESOLVED | Noted: 2017-06-02 | Resolved: 2019-01-14

## 2019-01-14 PROBLEM — R79.9 ABNORMAL BLOOD CHEMISTRY: Status: RESOLVED | Noted: 2018-09-10 | Resolved: 2019-01-14

## 2019-01-14 PROBLEM — R06.00 DYSPNEA ON EXERTION: Status: RESOLVED | Noted: 2017-06-02 | Resolved: 2019-01-14

## 2019-01-14 PROBLEM — R06.09 DYSPNEA ON EXERTION: Status: RESOLVED | Noted: 2017-06-02 | Resolved: 2019-01-14

## 2019-01-14 PROBLEM — D72.829 LEUKOCYTOSIS, UNSPECIFIED TYPE: Status: RESOLVED | Noted: 2017-06-02 | Resolved: 2019-01-14

## 2019-01-14 PROCEDURE — G0402 INITIAL PREVENTIVE EXAM: HCPCS | Performed by: FAMILY MEDICINE

## 2019-01-14 PROCEDURE — 99397 PER PM REEVAL EST PAT 65+ YR: CPT | Performed by: FAMILY MEDICINE

## 2019-01-14 PROCEDURE — 96160 PT-FOCUSED HLTH RISK ASSMT: CPT | Performed by: FAMILY MEDICINE

## 2019-01-14 NOTE — PROGRESS NOTES
HPI:   Rosa Elena Urbina is a 76year old female who presents for a MA (Medicare Advantage) Supervisit (Once per calendar year).       Her last annual assessment has been over 1 year: Annual Physical due on 09/14/1952         Fall/Risk Assessment   She has been PCP - General (Family Medicine)  Art Velez MD (GASTROENTEROLOGY)    Patient Active Problem List:     Morbid obesity [facility])     Essential hypertension     Pure hypercholesterolemia     Diabetes type 2, controlled ([facility] [address]     Irritability and anger congestion, sinus pain or ST  LUNGS: denies shortness of breath with exertion  CARDIOVASCULAR: denies chest pain on exertion  GI: denies abdominal pain, denies heartburn  : denies dysuria, vaginal discharge or itching, no complaint of urinary incontinenc axillary nodes normal   Neurologic: Normal       Vaccination History     Immunization History   Administered Date(s) Administered   • >=3 YRS TRI  MULTIDOSE VIAL (81256) FLU CLINIC 11/24/2014   • FLU VACC High Dose 65 YRS & Older PRSV Free (90740) 09/07/20 Good  How do you maintain positive mental well-being?: Games      This section provided for quick review of chart, separate sheet to patient  [address] Internal Lab or Procedure External Lab or Procedure   Diabetes Scr (Prevnar)  Covered Once after 65 06/20/2017 Please get once after your 65th birthday    Pneumococcal 23 (Pneumovax)  Covered Once after 65 09/10/2018 Please get once after your 65th birthday    Hepatitis B for Moderate/High Risk No vaccine history found Me

## 2019-01-31 RX ORDER — PRAVASTATIN SODIUM 40 MG
TABLET ORAL
Qty: 30 TABLET | Refills: 0 | Status: SHIPPED | OUTPATIENT
Start: 2019-01-31 | End: 2019-02-20

## 2019-02-17 ENCOUNTER — PATIENT MESSAGE (OUTPATIENT)
Dept: FAMILY MEDICINE CLINIC | Facility: CLINIC | Age: 69
End: 2019-02-17

## 2019-02-18 ENCOUNTER — TELEPHONE (OUTPATIENT)
Dept: FAMILY MEDICINE CLINIC | Facility: CLINIC | Age: 69
End: 2019-02-18

## 2019-02-18 DIAGNOSIS — H91.93 BILATERAL HEARING LOSS, UNSPECIFIED HEARING LOSS TYPE: Primary | ICD-10-CM

## 2019-02-18 NOTE — TELEPHONE ENCOUNTER
Patient informed referral placed. Pt states Dr. Didi Erazo office wanted us to fax referral once placed.   Fax # [phone]  Confirmation received

## 2019-02-18 NOTE — TELEPHONE ENCOUNTER
Received:  Today   Message Contents   Phylicia Young [facility] Clinical Staff   Cc: [facility] Referral Pool   Phone Number: [phone]             .Reason for the order/referral:Hearing Aid   PCP: Malik Mackenzie   Refer to Provider: NATE CAMP [facility]

## 2019-02-20 RX ORDER — PRAVASTATIN SODIUM 40 MG
TABLET ORAL
Qty: 90 TABLET | Refills: 1 | Status: SHIPPED | OUTPATIENT
Start: 2019-02-20 | End: 2019-04-03

## 2019-02-20 NOTE — TELEPHONE ENCOUNTER
Cholesterol Medication Protocol Passed2/20 1:09 PM   ALT < 80    ALT resulted within past year    Lipid panel within past 12 months    Appointment within past 12 or next 3 months     Requesting pravastatin 40mg  LOV: 1//14/19  RTC:   Last Relevant Labs: 7/

## 2019-02-23 LAB
ALBUMIN/GLOBULIN RATIO: 1.5 (CALC) (ref 1–2.5)
ALBUMIN: 3.8 G/DL (ref 3.6–5.1)
ALKALINE PHOSPHATASE: 45 U/L (ref 33–130)
ALT: 13 U/L (ref 6–29)
AST: 12 U/L (ref 10–35)
BILIRUBIN, TOTAL: 0.4 MG/DL (ref 0.2–1.2)
BUN: 15 MG/DL (ref 7–25)
CALCIUM: 9.2 MG/DL (ref 8.6–10.4)
CARBON DIOXIDE: 32 MMOL/L (ref 20–32)
CHLORIDE: 100 MMOL/L (ref 98–110)
CREATININE: 0.83 MG/DL (ref 0.5–0.99)
EGFR IF AFRICN AM: 84 ML/MIN/1.73M2
EGFR IF NONAFRICN AM: 72 ML/MIN/1.73M2
GLOBULIN: 2.6 G/DL (CALC) (ref 1.9–3.7)
GLUCOSE: 173 MG/DL (ref 65–99)
HEMOGLOBIN A1C: 6.6 % OF TOTAL HGB
POTASSIUM: 4.9 MMOL/L (ref 3.5–5.3)
PROTEIN, TOTAL: 6.4 G/DL (ref 6.1–8.1)
SODIUM: 140 MMOL/L (ref 135–146)
VITAMIN D, 25-OH, TOTAL: 48 NG/ML (ref 30–100)

## 2019-02-26 ENCOUNTER — PATIENT MESSAGE (OUTPATIENT)
Dept: FAMILY MEDICINE CLINIC | Facility: CLINIC | Age: 69
End: 2019-02-26

## 2019-02-26 NOTE — TELEPHONE ENCOUNTER
From: Isaiah Parker  To: Snow Blas MD  Sent: 2/26/2019 11:56 AM CST  Subject: Other    My chart said that I need to go for my eye dilation.  I went to [facility] last week and had my eyes tested for new glasses, when I was being tested the Dr. cleary

## 2019-03-28 ENCOUNTER — HOSPITAL (OUTPATIENT)
Dept: OTHER | Age: 69
End: 2019-03-28
Attending: FAMILY MEDICINE

## 2019-04-01 ENCOUNTER — MED REC SCAN ONLY (OUTPATIENT)
Dept: FAMILY MEDICINE CLINIC | Facility: CLINIC | Age: 69
End: 2019-04-01

## 2019-04-03 ENCOUNTER — OFFICE VISIT (OUTPATIENT)
Dept: FAMILY MEDICINE CLINIC | Facility: CLINIC | Age: 69
End: 2019-04-03
Payer: MEDICARE

## 2019-04-03 VITALS
TEMPERATURE: 99 F | DIASTOLIC BLOOD PRESSURE: 68 MMHG | SYSTOLIC BLOOD PRESSURE: 118 MMHG | WEIGHT: 292 LBS | BODY MASS INDEX: 48.65 KG/M2 | HEART RATE: 80 BPM | RESPIRATION RATE: 14 BRPM | OXYGEN SATURATION: 97 % | HEIGHT: 65 IN

## 2019-04-03 DIAGNOSIS — R05.8 PRODUCTIVE COUGH: Primary | ICD-10-CM

## 2019-04-03 PROCEDURE — 99213 OFFICE O/P EST LOW 20 MIN: CPT | Performed by: FAMILY MEDICINE

## 2019-04-03 RX ORDER — AMOXICILLIN AND CLAVULANATE POTASSIUM 875; 125 MG/1; MG/1
1 TABLET, FILM COATED ORAL 2 TIMES DAILY
Qty: 20 TABLET | Refills: 0 | Status: SHIPPED | OUTPATIENT
Start: 2019-04-03 | End: 2019-04-13

## 2019-04-03 NOTE — PROGRESS NOTES
CHIEF COMPLAINT:   Archie Miller presents with productive cough with green/brown phlegm     HPI:   Patient has a cough  for the past 2 day(s). The cough is  productive. Patient denies any chest pain shortness of breath or wheezing.   Patient does not have Disp: 180 tablet Rfl: 0       Orders Placed This Encounter      Amoxicillin-Pot Clavulanate 875-125 MG Oral Tab          Sig: Take 1 tablet by mouth 2 (two) times daily for 10 days.  TAKE WITH FOOD          Dispense:  20 tablet          Refill:  0

## 2019-04-08 ENCOUNTER — TELEPHONE (OUTPATIENT)
Dept: FAMILY MEDICINE CLINIC | Facility: CLINIC | Age: 69
End: 2019-04-08

## 2019-04-08 NOTE — TELEPHONE ENCOUNTER
Report received, reviewed per Dr. Faith Cos, Impression: Benign, there is no mammographic evidence of malignancy. A 1 year screening mammogram is recommended. Inform pt. Inspherion message sent to pt today. See 4-1-19 e-mail. Sent report to scan.  Task complet

## 2019-05-06 ENCOUNTER — OFFICE VISIT (OUTPATIENT)
Dept: FAMILY MEDICINE CLINIC | Facility: CLINIC | Age: 69
End: 2019-05-06
Payer: MEDICARE

## 2019-05-06 VITALS
WEIGHT: 286 LBS | TEMPERATURE: 98 F | SYSTOLIC BLOOD PRESSURE: 128 MMHG | HEIGHT: 65 IN | HEART RATE: 62 BPM | BODY MASS INDEX: 47.65 KG/M2 | OXYGEN SATURATION: 99 % | RESPIRATION RATE: 16 BRPM | DIASTOLIC BLOOD PRESSURE: 80 MMHG

## 2019-05-06 DIAGNOSIS — R19.7 DIARRHEA, UNSPECIFIED TYPE: ICD-10-CM

## 2019-05-06 DIAGNOSIS — R11.2 NAUSEA AND VOMITING, INTRACTABILITY OF VOMITING NOT SPECIFIED, UNSPECIFIED VOMITING TYPE: Primary | ICD-10-CM

## 2019-05-06 PROCEDURE — 99214 OFFICE O/P EST MOD 30 MIN: CPT | Performed by: FAMILY MEDICINE

## 2019-05-06 NOTE — PROGRESS NOTES
HPI:   Jeff Carbajal is a 76year old female that presents for Patient presents with:  Viral Syndrome: No feeling well, last Sunday she ate a salad-store brought- and after she was experiencing vomiting and diarrhea which lasted until that following Thursd dentition. NECK: Supple, no cervical LAD, no thyromegaly. SKIN: No rashes, no skin lesion, no bruising, good turgor. HEART:  Regular rate and rhythm, no murmurs, rubs or gallops. LUNGS: Clear to auscultation bilterally, no rales/rhonchi/wheezing.   ABDO

## 2019-06-21 ENCOUNTER — PATIENT MESSAGE (OUTPATIENT)
Dept: FAMILY MEDICINE CLINIC | Facility: CLINIC | Age: 69
End: 2019-06-21

## 2019-06-21 DIAGNOSIS — E78.00 PURE HYPERCHOLESTEROLEMIA: ICD-10-CM

## 2019-06-21 DIAGNOSIS — E11.8 CONTROLLED DIABETES MELLITUS TYPE 2 WITH COMPLICATIONS, UNSPECIFIED WHETHER LONG TERM INSULIN USE (HCC): ICD-10-CM

## 2019-06-21 DIAGNOSIS — R73.09 ELEVATED HEMOGLOBIN A1C: Primary | ICD-10-CM

## 2019-06-21 NOTE — TELEPHONE ENCOUNTER
From: Irma Kaba  To: Deejay Desai MD  Sent: 6/21/2019 1:26 PM CDT  Subject: Other    Do I need to schedule my A1C test with [facility] for July?  Will you send the orders over to [facility]? Do I need to fast for the test? After I have the blood test done at

## 2019-06-21 NOTE — PROGRESS NOTES
Notes recorded by Zain Falcon MD on 2/26/2019 at 5:20 PM CST  Labs reviewed and hemoglobin A1c is stable at 6.6.  Vitamin D is good.  Other labs are good.  I would recommend rechecking CMP, lipids, hemoglobin A1c fasting in 4 months and follow-up afte

## 2019-06-24 RX ORDER — LISINOPRIL 10 MG/1
TABLET ORAL
Qty: 90 TABLET | Refills: 0 | Status: SHIPPED | OUTPATIENT
Start: 2019-06-24 | End: 2019-08-02

## 2019-06-24 RX ORDER — LISINOPRIL 10 MG/1
10 TABLET ORAL DAILY
Qty: 90 TABLET | Refills: 1 | Status: SHIPPED | OUTPATIENT
Start: 2019-06-24 | End: 2019-07-22

## 2019-06-24 NOTE — TELEPHONE ENCOUNTER
Requested Medications      lisinopril 10 MG Oral Tab         Sig: N/A    Disp:  90 tablet    Refills:  1    Start: 6/22/2019    Class: Normal    Non-formulary    Last ordered: 6 months ago by Saloni Torres MD     Hypertension Medications Protocol Passed6

## 2019-07-02 LAB
ALBUMIN/GLOBULIN RATIO: 1.5 (CALC) (ref 1–2.5)
ALBUMIN: 3.8 G/DL (ref 3.6–5.1)
ALKALINE PHOSPHATASE: 48 U/L (ref 33–130)
ALT: 14 U/L (ref 6–29)
AST: 13 U/L (ref 10–35)
BILIRUBIN, TOTAL: 0.4 MG/DL (ref 0.2–1.2)
BUN: 17 MG/DL (ref 7–25)
CALCIUM: 9.3 MG/DL (ref 8.6–10.4)
CARBON DIOXIDE: 35 MMOL/L (ref 20–32)
CHLORIDE: 100 MMOL/L (ref 98–110)
CHOL/HDLC RATIO: 2.7 (CALC)
CHOLESTEROL, TOTAL: 156 MG/DL
CREATININE: 0.9 MG/DL (ref 0.5–0.99)
EGFR IF AFRICN AM: 76 ML/MIN/1.73M2
EGFR IF NONAFRICN AM: 66 ML/MIN/1.73M2
GLOBULIN: 2.6 G/DL (CALC) (ref 1.9–3.7)
GLUCOSE: 131 MG/DL (ref 65–99)
HDL CHOLESTEROL: 57 MG/DL
HEMOGLOBIN A1C: 6.6 % OF TOTAL HGB
LDL-CHOLESTEROL: 79 MG/DL (CALC)
NON-HDL CHOLESTEROL: 99 MG/DL (CALC)
POTASSIUM: 4.6 MMOL/L (ref 3.5–5.3)
PROTEIN, TOTAL: 6.4 G/DL (ref 6.1–8.1)
SODIUM: 142 MMOL/L (ref 135–146)
TRIGLYCERIDES: 112 MG/DL

## 2019-07-22 ENCOUNTER — OFFICE VISIT (OUTPATIENT)
Dept: FAMILY MEDICINE CLINIC | Facility: CLINIC | Age: 69
End: 2019-07-22
Payer: MEDICARE

## 2019-07-22 VITALS
HEART RATE: 72 BPM | TEMPERATURE: 98 F | RESPIRATION RATE: 16 BRPM | OXYGEN SATURATION: 99 % | SYSTOLIC BLOOD PRESSURE: 124 MMHG | WEIGHT: 292 LBS | BODY MASS INDEX: 48.65 KG/M2 | HEIGHT: 65 IN | DIASTOLIC BLOOD PRESSURE: 80 MMHG

## 2019-07-22 DIAGNOSIS — E66.01 MORBID OBESITY (HCC): ICD-10-CM

## 2019-07-22 DIAGNOSIS — I10 ESSENTIAL HYPERTENSION: ICD-10-CM

## 2019-07-22 DIAGNOSIS — Z12.31 ENCOUNTER FOR SCREENING MAMMOGRAM FOR MALIGNANT NEOPLASM OF BREAST: ICD-10-CM

## 2019-07-22 DIAGNOSIS — E11.9 CONTROLLED TYPE 2 DIABETES MELLITUS WITHOUT COMPLICATION, UNSPECIFIED WHETHER LONG TERM INSULIN USE (HCC): Primary | ICD-10-CM

## 2019-07-22 PROCEDURE — 99214 OFFICE O/P EST MOD 30 MIN: CPT | Performed by: FAMILY MEDICINE

## 2019-07-22 NOTE — PROGRESS NOTES
HPI:   Power Bhatt is a 76year old female that presents for Patient presents with:  Lab Results: Recent lab results from 7/1/19- she is due for mammogram, DEXA, and diabetic eye exam.        Past medical, surgical, family and social history reviewed in and rhythm, no murmurs, rubs or gallops. LUNGS: Clear to auscultation bilterally, no rales/rhonchi/wheezing. ABDOMEN:  Soft, nondistended, nontender, bowel sounds normal in all 4 quadrants, no masses, no hepatosplenomegaly.   EXTREMITIES:  No edema, no cy

## 2019-07-29 ENCOUNTER — TELEPHONE (OUTPATIENT)
Dept: FAMILY MEDICINE CLINIC | Facility: CLINIC | Age: 69
End: 2019-07-29

## 2019-08-05 ENCOUNTER — PATIENT MESSAGE (OUTPATIENT)
Dept: FAMILY MEDICINE CLINIC | Facility: CLINIC | Age: 69
End: 2019-08-05

## 2019-08-05 RX ORDER — PRAVASTATIN SODIUM 40 MG
TABLET ORAL
Qty: 90 TABLET | Refills: 1 | Status: SHIPPED | OUTPATIENT
Start: 2019-08-05 | End: 2020-03-04

## 2019-08-05 RX ORDER — LISINOPRIL 10 MG/1
10 TABLET ORAL DAILY
Qty: 90 TABLET | Refills: 0 | Status: SHIPPED | OUTPATIENT
Start: 2019-08-05 | End: 2019-10-17

## 2019-08-05 NOTE — TELEPHONE ENCOUNTER
Requested Prescriptions     Pending Prescriptions Disp Refills   • metFORMIN HCl 850 MG Oral Tab 180 tablet 0   • Pravastatin Sodium 40 MG Oral Tab 90 tablet 1     Sig: TAKE 1 TABLET BY MOUTH NIGHTLY   • lisinopril 10 MG Oral Tab 90 tablet 0     Requesting

## 2019-08-05 NOTE — TELEPHONE ENCOUNTER
From: Dena Vargas  To: James Serrato MD  Sent: 8/5/2019 10:45 AM CDT  Subject: Non-Urgent Medical Question    I was asked by Dr. Klaus Nguyễn to have the eye exam I had in Feb. faxed into his office.  I asked [facility] to fax that into his office last w

## 2019-08-08 ENCOUNTER — TELEPHONE (OUTPATIENT)
Dept: FAMILY MEDICINE CLINIC | Facility: CLINIC | Age: 69
End: 2019-08-08

## 2019-08-09 NOTE — PROGRESS NOTES
Contacted [facility] in [facility] and they will be faxing over the eye exam notes.    [facility] [place]  Phone: [phone]

## 2019-08-26 ENCOUNTER — OFFICE VISIT (OUTPATIENT)
Dept: FAMILY MEDICINE CLINIC | Facility: CLINIC | Age: 69
End: 2019-08-26
Payer: MEDICARE

## 2019-08-26 ENCOUNTER — HOSPITAL ENCOUNTER (OUTPATIENT)
Dept: GENERAL RADIOLOGY | Age: 69
Discharge: HOME | End: 2019-08-26
Attending: FAMILY MEDICINE
Payer: MEDICARE

## 2019-08-26 ENCOUNTER — LAB ENCOUNTER (OUTPATIENT)
Dept: LAB | Age: 69
End: 2019-08-26
Attending: FAMILY MEDICINE
Payer: MEDICARE

## 2019-08-26 VITALS
HEART RATE: 60 BPM | HEIGHT: 65 IN | TEMPERATURE: 100 F | WEIGHT: 293 LBS | DIASTOLIC BLOOD PRESSURE: 70 MMHG | SYSTOLIC BLOOD PRESSURE: 118 MMHG | BODY MASS INDEX: 48.82 KG/M2

## 2019-08-26 DIAGNOSIS — E11.9 DIABETES (HCC): Primary | ICD-10-CM

## 2019-08-26 DIAGNOSIS — M25.562 ACUTE PAIN OF LEFT KNEE: ICD-10-CM

## 2019-08-26 DIAGNOSIS — M79.605 PAIN OF LEFT LOWER EXTREMITY: ICD-10-CM

## 2019-08-26 DIAGNOSIS — M25.562 ACUTE PAIN OF LEFT KNEE: Primary | ICD-10-CM

## 2019-08-26 PROCEDURE — 73590 X-RAY EXAM OF LOWER LEG: CPT | Performed by: FAMILY MEDICINE

## 2019-08-26 PROCEDURE — 99214 OFFICE O/P EST MOD 30 MIN: CPT | Performed by: FAMILY MEDICINE

## 2019-08-26 PROCEDURE — 73560 X-RAY EXAM OF KNEE 1 OR 2: CPT | Performed by: FAMILY MEDICINE

## 2019-08-27 PROBLEM — M79.605 PAIN OF LEFT LOWER EXTREMITY: Status: ACTIVE | Noted: 2019-08-27

## 2019-08-27 PROBLEM — M25.562 ACUTE PAIN OF LEFT KNEE: Status: ACTIVE | Noted: 2019-08-27

## 2019-08-27 NOTE — PROGRESS NOTES
HPI:   Myesha Johnson is a 76year old female   at presents for Patient presents with: Other: fell 3 weeks ago triped over her dogs toys. She has been having some right knee pain and pain on her left leg ( shin bone).     She states she fell on her right kn this encounter: 293 lb 6.4 oz. Vital signs reviewed. Appears stated age, well groomed. Physical Exam:  GEN:  Patient is alert, awake and oriented, well developed, well nourished, no apparent distress.   HEENT:     Head:  Normocephalic, atraumatic    Eyes: treatment plan discussed in detail. Questions and concerns addressed. Red flags to RTC or ED reviewed. Patient (or parent) agrees to plan. No follow-ups on file. Patricia Carrington M.D.   Family Medicine   8/27/2019  10:03 AM

## 2019-08-30 ENCOUNTER — TELEPHONE (OUTPATIENT)
Dept: FAMILY MEDICINE CLINIC | Facility: CLINIC | Age: 69
End: 2019-08-30

## 2019-08-30 ENCOUNTER — HOSPITAL ENCOUNTER (OUTPATIENT)
Dept: ULTRASOUND IMAGING | Facility: HOSPITAL | Age: 69
Discharge: HOME | End: 2019-08-30
Attending: FAMILY MEDICINE
Payer: MEDICARE

## 2019-08-30 DIAGNOSIS — M79.89 SYMPTOM OF LEG SWELLING: ICD-10-CM

## 2019-08-30 DIAGNOSIS — M79.89 SYMPTOM OF LEG SWELLING: Primary | ICD-10-CM

## 2019-08-30 PROCEDURE — 93971 EXTREMITY STUDY: CPT | Performed by: FAMILY MEDICINE

## 2019-08-30 NOTE — TELEPHONE ENCOUNTER
Spoke with pt, informed of STAT ultrasound appt for tonight. Pt verbalized understanding and agreement.

## 2019-08-30 NOTE — TELEPHONE ENCOUNTER
Spoke with Tawana Tolliver at [facility], scheduled US for 7:30pm tonight at the [facility] hospital.

## 2019-08-30 NOTE — TELEPHONE ENCOUNTER
Dr. Lucinda Freeman- Pt was told to call on Friday with update. Leg is still having swelling. Also can patient get results from the xray done on Monday.      Please call [phone]

## 2019-08-30 NOTE — TELEPHONE ENCOUNTER
Spoke with pt, informed results and MD recommendations below. Pt verbalized understanding and agreement. Pt states she is not available to do STAT Ultrasound today but is available anytime tomorrow. Advised pt I would call to schedule US for tomorrow.

## 2019-09-02 ENCOUNTER — TELEPHONE (OUTPATIENT)
Dept: FAMILY MEDICINE CLINIC | Facility: CLINIC | Age: 69
End: 2019-09-02

## 2019-09-03 NOTE — TELEPHONE ENCOUNTER
Called pt regarding STAT results, no DVT. All questions were answered, pt verbalized understanding. Advised pt to follow-up with PCP as directed.

## 2019-09-17 NOTE — TELEPHONE ENCOUNTER
Requested Prescriptions     Pending Prescriptions Disp Refills   • METFORMIN  MG Oral Tab [Pharmacy Med Name: metFORMIN HCl Oral Tablet 850 MG] 180 tablet 0     Sig: TAKE 1 TABLET BY MOUTH TWO TIMES A DAY WITH MEALS       LOV: 8/26/2019  RTC: 3-4 da

## 2019-09-25 ENCOUNTER — PATIENT MESSAGE (OUTPATIENT)
Dept: FAMILY MEDICINE CLINIC | Facility: CLINIC | Age: 69
End: 2019-09-25

## 2019-09-25 NOTE — TELEPHONE ENCOUNTER
From: Cary Gross  To: Mojgan Jackson MD  Sent: 9/25/2019 11:27 AM CDT  Subject: Prescription Question    The doctor told me to have a blood test for my A1C in October. Has the doctor sent in a request to [facility] so I can have the test done?  Do I need to

## 2019-09-25 NOTE — TELEPHONE ENCOUNTER
From: Angelina Reddy  To: Flavia Rowe MD  Sent: 9/25/2019 11:24 AM CDT  Subject: Prescription Question    I contacted [address] [place] [address] (3) times regarding a new A1C meter and pen/lancets, test strips, alchol wipes.  [employer] said they would contact the Dr

## 2019-10-09 ENCOUNTER — PATIENT MESSAGE (OUTPATIENT)
Dept: FAMILY MEDICINE CLINIC | Facility: CLINIC | Age: 69
End: 2019-10-09

## 2019-10-09 DIAGNOSIS — E55.9 VITAMIN D DEFICIENCY: ICD-10-CM

## 2019-10-09 DIAGNOSIS — E11.9 CONTROLLED TYPE 2 DIABETES MELLITUS WITHOUT COMPLICATION, UNSPECIFIED WHETHER LONG TERM INSULIN USE (HCC): Primary | ICD-10-CM

## 2019-10-09 DIAGNOSIS — Z00.00 ROUTINE ADULT HEALTH MAINTENANCE: ICD-10-CM

## 2019-10-09 RX ORDER — BLOOD SUGAR DIAGNOSTIC
STRIP MISCELLANEOUS
Qty: 100 STRIP | Refills: 1 | Status: SHIPPED | OUTPATIENT
Start: 2019-10-09 | End: 2020-09-24

## 2019-10-09 RX ORDER — LANCETS
1 EACH MISCELLANEOUS 2 TIMES DAILY
Qty: 102 EACH | Refills: 3 | Status: SHIPPED | OUTPATIENT
Start: 2019-10-09 | End: 2020-03-04

## 2019-10-09 RX ORDER — PEN NEEDLE, DIABETIC 31 GX5/16"
NEEDLE, DISPOSABLE MISCELLANEOUS
Qty: 200 EACH | Refills: 3 | Status: SHIPPED | OUTPATIENT
Start: 2019-10-09 | End: 2020-03-04

## 2019-10-09 RX ORDER — BLOOD SUGAR DIAGNOSTIC
STRIP MISCELLANEOUS
Qty: 100 STRIP | Refills: 3 | Status: SHIPPED | OUTPATIENT
Start: 2019-10-09 | End: 2020-03-04

## 2019-10-09 NOTE — TELEPHONE ENCOUNTER
From: Jammie Irizarry  To: Aarti Lopez MD  Sent: 10/9/2019 12:55 AM CDT  Subject: Other    Last week I had my flu shot at [facility]. I received a call from your office regarding my A1C results, thank you.  When do I or should I schedule an appoint to see th

## 2019-10-18 RX ORDER — LISINOPRIL 10 MG/1
10 TABLET ORAL DAILY
Qty: 90 TABLET | Refills: 0 | Status: ON HOLD | OUTPATIENT
Start: 2019-10-18 | End: 2019-12-09

## 2019-10-18 RX ORDER — PRAVASTATIN SODIUM 40 MG
TABLET ORAL
Qty: 90 TABLET | Refills: 1 | OUTPATIENT
Start: 2019-10-18

## 2019-10-18 NOTE — TELEPHONE ENCOUNTER
Requested Prescriptions     Pending Prescriptions Disp Refills   • Pravastatin Sodium 40 MG Oral Tab 90 tablet 1     Sig: TAKE 1 TABLET BY MOUTH NIGHTLY   • lisinopril 10 MG Oral Tab 90 tablet 0     Sig: Take 1 tablet (10 mg total) by mouth daily.    • metF

## 2019-12-03 ENCOUNTER — EKG ENCOUNTER (OUTPATIENT)
Dept: LAB | Age: 69
DRG: 291 | End: 2019-12-03
Attending: FAMILY MEDICINE
Payer: MEDICARE

## 2019-12-03 ENCOUNTER — TELEPHONE (OUTPATIENT)
Dept: FAMILY MEDICINE CLINIC | Facility: CLINIC | Age: 69
End: 2019-12-03

## 2019-12-03 ENCOUNTER — HOSPITAL ENCOUNTER (OUTPATIENT)
Dept: GENERAL RADIOLOGY | Age: 69
Discharge: HOME | DRG: 291 | End: 2019-12-03
Attending: FAMILY MEDICINE
Payer: MEDICARE

## 2019-12-03 ENCOUNTER — HOSPITAL ENCOUNTER (INPATIENT)
Facility: HOSPITAL | Age: 69
LOS: 7 days | Discharge: HOME | DRG: 291 | End: 2019-12-10
Attending: EMERGENCY MEDICINE | Admitting: HOSPITALIST
Payer: MEDICARE

## 2019-12-03 ENCOUNTER — OFFICE VISIT (OUTPATIENT)
Dept: FAMILY MEDICINE CLINIC | Facility: CLINIC | Age: 69
End: 2019-12-03
Payer: MEDICARE

## 2019-12-03 ENCOUNTER — APPOINTMENT (OUTPATIENT)
Dept: LAB | Age: 69
DRG: 291 | End: 2019-12-03
Attending: FAMILY MEDICINE
Payer: MEDICARE

## 2019-12-03 VITALS
OXYGEN SATURATION: 92 % | RESPIRATION RATE: 16 BRPM | HEART RATE: 69 BPM | TEMPERATURE: 99 F | HEIGHT: 65 IN | SYSTOLIC BLOOD PRESSURE: 130 MMHG | DIASTOLIC BLOOD PRESSURE: 80 MMHG | BODY MASS INDEX: 48.82 KG/M2 | WEIGHT: 293 LBS

## 2019-12-03 DIAGNOSIS — R06.89 HYPERCAPNIA: ICD-10-CM

## 2019-12-03 DIAGNOSIS — I50.9 ACUTE CONGESTIVE HEART FAILURE, UNSPECIFIED HEART FAILURE TYPE (HCC): Primary | ICD-10-CM

## 2019-12-03 DIAGNOSIS — R06.00 DYSPNEA, UNSPECIFIED TYPE: ICD-10-CM

## 2019-12-03 DIAGNOSIS — R06.00 DYSPNEA: Primary | ICD-10-CM

## 2019-12-03 DIAGNOSIS — R09.02 HYPOXIA: ICD-10-CM

## 2019-12-03 PROBLEM — I50.31 ACUTE DIASTOLIC HEART FAILURE (HCC): Status: ACTIVE | Noted: 2019-12-03

## 2019-12-03 PROBLEM — I10 ESSENTIAL HYPERTENSION: Status: ACTIVE | Noted: 2019-12-03

## 2019-12-03 PROCEDURE — 99223 1ST HOSP IP/OBS HIGH 75: CPT | Performed by: INTERNAL MEDICINE

## 2019-12-03 PROCEDURE — 80048 BASIC METABOLIC PNL TOTAL CA: CPT | Performed by: FAMILY MEDICINE

## 2019-12-03 PROCEDURE — 71046 X-RAY EXAM CHEST 2 VIEWS: CPT | Performed by: FAMILY MEDICINE

## 2019-12-03 PROCEDURE — 99223 1ST HOSP IP/OBS HIGH 75: CPT | Performed by: HOSPITALIST

## 2019-12-03 PROCEDURE — 99215 OFFICE O/P EST HI 40 MIN: CPT | Performed by: FAMILY MEDICINE

## 2019-12-03 PROCEDURE — 85027 COMPLETE CBC AUTOMATED: CPT | Performed by: FAMILY MEDICINE

## 2019-12-03 PROCEDURE — 85379 FIBRIN DEGRADATION QUANT: CPT | Performed by: FAMILY MEDICINE

## 2019-12-03 PROCEDURE — 83880 ASSAY OF NATRIURETIC PEPTIDE: CPT | Performed by: FAMILY MEDICINE

## 2019-12-03 PROCEDURE — 93010 ELECTROCARDIOGRAM REPORT: CPT | Performed by: INTERNAL MEDICINE

## 2019-12-03 PROCEDURE — 93005 ELECTROCARDIOGRAM TRACING: CPT

## 2019-12-03 PROCEDURE — 36415 COLL VENOUS BLD VENIPUNCTURE: CPT | Performed by: FAMILY MEDICINE

## 2019-12-03 PROCEDURE — 84484 ASSAY OF TROPONIN QUANT: CPT | Performed by: FAMILY MEDICINE

## 2019-12-03 RX ORDER — FUROSEMIDE 10 MG/ML
40 INJECTION INTRAMUSCULAR; INTRAVENOUS 3 TIMES DAILY
Status: DISCONTINUED | OUTPATIENT
Start: 2019-12-03 | End: 2019-12-03

## 2019-12-03 RX ORDER — FUROSEMIDE 10 MG/ML
40 INJECTION INTRAMUSCULAR; INTRAVENOUS 3 TIMES DAILY
Status: DISCONTINUED | OUTPATIENT
Start: 2019-12-03 | End: 2019-12-04

## 2019-12-03 RX ORDER — DEXTROSE MONOHYDRATE 25 G/50ML
50 INJECTION, SOLUTION INTRAVENOUS
Status: DISCONTINUED | OUTPATIENT
Start: 2019-12-03 | End: 2019-12-10

## 2019-12-03 RX ORDER — FUROSEMIDE 10 MG/ML
40 INJECTION INTRAMUSCULAR; INTRAVENOUS ONCE
Status: COMPLETED | OUTPATIENT
Start: 2019-12-03 | End: 2019-12-03

## 2019-12-03 RX ORDER — ACETAMINOPHEN 325 MG/1
650 TABLET ORAL EVERY 6 HOURS PRN
Status: DISCONTINUED | OUTPATIENT
Start: 2019-12-03 | End: 2019-12-10

## 2019-12-03 RX ORDER — ATORVASTATIN CALCIUM 10 MG/1
10 TABLET, FILM COATED ORAL NIGHTLY
Status: DISCONTINUED | OUTPATIENT
Start: 2019-12-03 | End: 2019-12-10

## 2019-12-03 RX ORDER — LISINOPRIL 20 MG/1
20 TABLET ORAL DAILY
Status: DISCONTINUED | OUTPATIENT
Start: 2019-12-04 | End: 2019-12-03

## 2019-12-03 RX ORDER — ENOXAPARIN SODIUM 100 MG/ML
0.5 INJECTION SUBCUTANEOUS DAILY
Status: DISCONTINUED | OUTPATIENT
Start: 2019-12-03 | End: 2019-12-10

## 2019-12-03 RX ORDER — LISINOPRIL 10 MG/1
10 TABLET ORAL DAILY
Status: DISCONTINUED | OUTPATIENT
Start: 2019-12-04 | End: 2019-12-06

## 2019-12-03 NOTE — TELEPHONE ENCOUNTER
BNP markedly elevated, EKG shows ischemic changes, chest x-ray shows probable CHF. Pt should go immediately from testing at [facility] to the ER there.

## 2019-12-03 NOTE — CONSULTS
[facility]  Cardiology Consultation    Cary Gross Patient Status:  Inpatient    1950 MRN [id number]   [facility] 8NE-A Attending Carrie Payne MD   Hosp Day # 0 PCP Jose J Clemente MD     Reason for Consultation: Worsening dy Outpatient medication: Lisinopril 10 mg daily Metformin 850 mg twice daily, pravastatin 40 mg a day  No current facility-administered medications for this encounter.      Review of Systems:  A comprehensive review of systems was performed and was negative i Dioxide, Total Latest Ref Range: 21.0 - 32.0 mmol/L 34.0 (H)   BUN Latest Ref Range: 7 - 18 mg/dL 15   CREATININE Latest Ref Range: 0.55 - 1.02 mg/dL 0.94   CALCIUM Latest Ref Range: 8.5 - 10.1 mg/dL 9.5   BUN/CREAT Ratio Latest Ref Range: 10.0 - 20.0  16.

## 2019-12-03 NOTE — TELEPHONE ENCOUNTER
Per Dr. Judy Jenkins: go now to  ER where pt is now. Informed both pt and pt's , Lolis Myers, of Dr. Darek Garcia recommendations, evaluation for CHF, and they both expressed understanding and agreement.  Advised them on the desk to register at for the ER in [place] [address]

## 2019-12-03 NOTE — PROGRESS NOTES
[facility] Pharmacy Note:  Anticoagulation Weight Dose Adjustment for enoxaparin (LOVENOX)    Karuna Davidson is a 71year old female who has been prescribed enoxaparin (LOVENOX) 40 mg every 24 hours.       Estimated Creatinine Clearance: 50.8 mL/min (based on SCr o

## 2019-12-03 NOTE — H&P
[facility]  History and Physical     Torsten Escobedo Patient Status:  Inpatient    1950 MRN [id number]   [facility] 8NE-A Attending Isael Ernst MD   Hosp Day # 0 PCP Zafar Landa MD     Chief Complaint: dyspnea    Histor 2 (two) times daily with meals. , Disp: 180 tablet, Rfl: 0  ACCU-CHEK FASTCLIX LANCETS Does not apply Misc, 1 lancet by Finger stick route 2 (two) times daily.  E11.9, Disp: 102 each, Rfl: 3  Glucose Blood (ACCU-CHEK SMARTVIEW) In Vitro Strip, Use to test bl Estimated Creatinine Clearance: 50.8 mL/min (based on SCr of 0.94 mg/dL). No results for input(s): PTP, INR in the last 168 hours. Recent Labs   Lab 12/03/19  1203   TROP <0.045       Imaging: Imaging data reviewed in Epic.       ASSESSMENT / PL

## 2019-12-03 NOTE — PROGRESS NOTES
HPI:   Farzaneh Castaneda is a 71year old female that presents for Patient presents with:  Shortness Of Breath: Started a week ago. Loss of energy.        Patient states over the last 1 to 2 weeks she has had some shortness of breath when she does mild exertion Location: Left arm, Patient Position: Sitting)   Pulse 69   Temp 99.1 °F (37.3 °C) (Oral)   Resp 16   Ht 65\"   Wt 297 lb 6.4 oz (134.9 kg)   SpO2 92%   BMI 49.49 kg/m²  Estimated body mass index is 49.49 kg/m² as calculated from the following:    Height a detail. Questions and concerns addressed. Red flags to RTC or ED reviewed. Patient (or parent) agrees to plan. No follow-ups on file. John Bertrand M.D.   Family Medicine   12/3/2019  11:53 AM

## 2019-12-03 NOTE — ED PROVIDER NOTES
Patient Seen in: [facility] In [place]      History   Patient presents with:  Dyspnea MITRA SOB  Congestive Heart Failure    Stated Complaint: SOB, CHF    HPI    63-year-old female complaining of shortness of breath the patient states s systems reviewed and negative except as noted above.     Physical Exam     ED Triage Vitals [12/03/19 1324]   BP (!) 141/103   Pulse 94   Resp 26   Temp    Temp src    SpO2 (!) 72 %   O2 Device None (Room air)       Current:/65   Pulse 78   Resp 22 failure, unspecified heart failure type ([facility] [address]  (primary encounter diagnosis)    Disposition:  Admit  12/3/2019  2:10 pm    Follow-up:  No follow-up provider specified.       Medications Prescribed:  Current Discharge Medication List                   Presen

## 2019-12-03 NOTE — ED INITIAL ASSESSMENT (HPI)
Pt sts she has been SOB for a couple of weeks. Called pcp and her ordered outpatient labs, EKG, and chest XR. Everything resulted today and pcp called to tell her she has CHF and should go to ED for treatment. Pro BNP 1,819. Denies CP.

## 2019-12-04 ENCOUNTER — APPOINTMENT (OUTPATIENT)
Dept: GENERAL RADIOLOGY | Facility: HOSPITAL | Age: 69
DRG: 291 | End: 2019-12-04
Attending: INTERNAL MEDICINE
Payer: MEDICARE

## 2019-12-04 ENCOUNTER — APPOINTMENT (OUTPATIENT)
Dept: CV DIAGNOSTICS | Facility: HOSPITAL | Age: 69
DRG: 291 | End: 2019-12-04
Attending: INTERNAL MEDICINE
Payer: MEDICARE

## 2019-12-04 PROCEDURE — 71045 X-RAY EXAM CHEST 1 VIEW: CPT | Performed by: INTERNAL MEDICINE

## 2019-12-04 PROCEDURE — 93306 TTE W/DOPPLER COMPLETE: CPT | Performed by: INTERNAL MEDICINE

## 2019-12-04 PROCEDURE — 5A09357 ASSISTANCE WITH RESPIRATORY VENTILATION, LESS THAN 24 CONSECUTIVE HOURS, CONTINUOUS POSITIVE AIRWAY PRESSURE: ICD-10-PCS | Performed by: INTERNAL MEDICINE

## 2019-12-04 PROCEDURE — 99232 SBSQ HOSP IP/OBS MODERATE 35: CPT | Performed by: INTERNAL MEDICINE

## 2019-12-04 PROCEDURE — 99233 SBSQ HOSP IP/OBS HIGH 50: CPT | Performed by: INTERNAL MEDICINE

## 2019-12-04 RX ORDER — FUROSEMIDE 10 MG/ML
40 INJECTION INTRAMUSCULAR; INTRAVENOUS
Status: DISCONTINUED | OUTPATIENT
Start: 2019-12-05 | End: 2019-12-06

## 2019-12-04 NOTE — PLAN OF CARE
Pt. Received to room 8602 from [facility]. She is alert and oriented times four. Lungs diminished with crackles left base. O2 at 4 liters nasal cannula. Pt. Is sinus rhythm on monitor. Lower extremities with four plus edema noted.

## 2019-12-04 NOTE — CONSULTS
[facility]  Pulmonary/Critical Care Consult Note  [facility]  Report of Consultation    Ninoska Shane Patient Status:  Inpatient    1950 MRN [id number]   [facility] 8NE-A Attending Bela Pope, 12/27/2018    Performed by Elder Porter MD at [address] [place] [address] ENDOSCOPY   • COLONOSCOPY N/A 5/1/2017    Performed by Elder Porter MD at [address] [place] [address] ENDOSCOPY   • HYSTERECTOMY  2000    partial hysteectomy     Family History   Problem Relation Age of Onset   • Diabete swallowing, speech problems, gait problems and weakness. : Denies dysuria, hematuria, urinary retention. Behavioral/Psych: Normal affect, mood, speech. No AVH, No SI/HI    All other review of systems are negative.     Vital signs in last 24 hours:  Taniya 12/04/19  0526   * 128*   BUN 15 19*   CREATSERUM 0.94 1.05*   GFRAA 72 63   GFRNAA 62 54*   CA 9.5 8.9    141   K 4.8 4.1    100   CO2 34.0* 38.0*     Recent Labs   Lab 12/03/19  1203   RBC 5.23   HGB 15.2   HCT 49.6*   MCV 94.8   MCH 2 88-92%; will check ABG and correlate with pulse oximetry  · Repeat CXR tomorrow  · HEATH protocol    Thank you for the consultation. Will follow with you.     Dariela Duque MD  [facility]

## 2019-12-04 NOTE — PAYOR COMM NOTE
--------------  ADMISSION REVIEW     Tejas Ernst [facility]  Subscriber #:  [id number]  Authorization Number: [id number]    Admit date: 12/3/19  Admit time: 26       Admitting Physician: Asia Salinas DO  Attending Physician:  Lars Woodward MD drinks    Drug use: No    Physical Exam     ED Triage Vitals [12/03/19 1324]   BP (!) 141/103   Pulse 94   Resp 26   Temp    Temp src    SpO2 (!) 72 %   O2 Device None (Room air)     Current:/65   Pulse 78   Resp 22   Ht 165.1 cm (5' 5\")   Wt 134.7 dyspnea    History of Present Illness: Angelina Reddy is a 71year old female with a past medical history of HTN, CHF, DM, DL. She comes to the ED due to dyspnea. Started about two weeks ago. She has noted increasing swelling in her LE as well.   She stat rebound, guarding or organomegaly. Neurologic: No focal neurological deficits. CNII-XII grossly intact. Musculoskeletal: Moves all extremities. Extremities: + LE edema, no cyanosis  Integument: No rashes or lesions.    Psychiatric: Appropriate mood and a °F (37.1 °C), Min:98.5 °F (36.9 °C), Max:99.1 °F (37.3 °C) oxygen saturation first recorded in the emergency room was 72%.   Respiratory rate first documented in the emergency room in [place] 26 breaths/min.        Intake/Output Summary (Last 24 hours) a 12/03/19 2326 97.9 °F (36.6 °C) 81 16 137/64 94 % — Nasal cannula 4 L/min SC   12/03/19 2001 98.2 °F (36.8 °C) 73 16 102/82 92 % — Nasal cannula 4 L/min SC   12/03/19 1554 — 74 20 149/69 94 % — Nasal cannula 5 L/min    12/03/19 1553 98.5 °F (36.9 °C) [phone]

## 2019-12-04 NOTE — PROGRESS NOTES
[facility]  Progress Note     Rosa Elena Carlitos Patient Status:  Inpatient    1950 MRN [id number]   [facility] 8NE-A Attending Saji Martini MD   Hosp Day # 1 PCP Karla Mcgarry MD     Chief Complaint: leg edema     S: Patient ov Oral Nightly   • enoxaparin  0.5 mg/kg Subcutaneous Daily   • Insulin Aspart Pen  1-10 Units Subcutaneous TID AC and HS   • Insulin Aspart Pen  1-68 Units Subcutaneous TID CC       ASSESSMENT / PLAN:     1. Acute CHF  1. Diurese-> to cont IV  2.  Echo pendi

## 2019-12-04 NOTE — PLAN OF CARE
PATIENT ADMITTED WITH CHF EXCEREBRATION , BOT H LEGS EDEMA +3 , DYSPNEA ,OXYGEN 4L N/C  WITH  , IV LASIX Q8HRS  ON,FREQUENT BRP ,TOLERATING , NO ACUTE DISTRESS NOTED, LIPITOR  STARTED , LOVENOX STARTED FOR DVT PROPHYLAXIS, PATIENT VOIDING GOOD AND DOCUM color and temperature  - Assess for signs of decreased coronary artery perfusion - ex.  Angina  - Evaluate fluid balance, assess for edema, trend weights  Outcome: Progressing  Goal: Absence of cardiac arrhythmias or at baseline  Description  INTERVENTIONS: saturation or ABGs  - Provide Smoking Cessation handout, if applicable  - Encourage broncho-pulmonary hygiene including cough, deep breathe, Incentive Spirometry  - Assess the need for suctioning and perform as needed  - Assess and instruct to report SOB o

## 2019-12-04 NOTE — PLAN OF CARE
Assumed care of patient @ 0730, A/OX4, admitted for CHF exac, answers questions,  at bedside, updated on POC. NSR w/PACs, IV Lasix decreased from TID to BID per cards, pt still having SOB when ambulating.  Pt was not wearing home o2, currently needs

## 2019-12-04 NOTE — PROGRESS NOTES
[facility]  Cardiology Progress Note    Subjective:  No chest pain. Mild SOB, on supplemental o2.     md exam:  No palpitations. Some beal, but better than day of admission. Legs less swollen.   She remembers being on home oxygen with nebulizer a coupl - grade 1 diastolic dysfunction. Doppler parameters are     consistent with elevated mean left atrial filling pressure. 2. Aortic valve: Trileaflet; mildly thickened, mildly calcified leaflets. Transvalvular velocity was within the normal range.  There pulmonary consult because the hypoxia seems out of proportion to her mild interstitial changes on cxr. She has also diuresed quite a bit, and still hits low 80s with oxygen when ambulating short distances.   She was sent out with supp oxygen two years or so

## 2019-12-05 ENCOUNTER — APPOINTMENT (OUTPATIENT)
Dept: CV DIAGNOSTICS | Facility: HOSPITAL | Age: 69
DRG: 291 | End: 2019-12-05
Attending: INTERNAL MEDICINE
Payer: MEDICARE

## 2019-12-05 ENCOUNTER — APPOINTMENT (OUTPATIENT)
Dept: GENERAL RADIOLOGY | Facility: HOSPITAL | Age: 69
DRG: 291 | End: 2019-12-05
Attending: INTERNAL MEDICINE
Payer: MEDICARE

## 2019-12-05 PROCEDURE — 71045 X-RAY EXAM CHEST 1 VIEW: CPT | Performed by: INTERNAL MEDICINE

## 2019-12-05 PROCEDURE — 93018 CV STRESS TEST I&R ONLY: CPT | Performed by: INTERNAL MEDICINE

## 2019-12-05 PROCEDURE — 78452 HT MUSCLE IMAGE SPECT MULT: CPT | Performed by: INTERNAL MEDICINE

## 2019-12-05 PROCEDURE — 99232 SBSQ HOSP IP/OBS MODERATE 35: CPT | Performed by: INTERNAL MEDICINE

## 2019-12-05 PROCEDURE — 99233 SBSQ HOSP IP/OBS HIGH 50: CPT | Performed by: INTERNAL MEDICINE

## 2019-12-05 PROCEDURE — 93017 CV STRESS TEST TRACING ONLY: CPT | Performed by: INTERNAL MEDICINE

## 2019-12-05 NOTE — DIETARY NOTE
[facility]   CLINICAL NUTRITION    Rosa Elena Urbina     Admitting diagnosis:  Acute congestive heart failure, unspecified heart failure type ([address] [I50.9]    Ht: 165.1 cm (5' 5\")  Wt: 129.1 kg (284 lb 9.8 oz). This is 227% of IBW  Body mass index is 47.

## 2019-12-05 NOTE — PROGRESS NOTES
[facility]  Cardiology Progress Note    Subjective:  No chest pain or shortness of breath.     Objective:  /54 (BP Location: Right arm)   Pulse 60   Temp 98.2 °F (36.8 °C) (Oral)   Resp 18   Ht 5' 5\" (1.651 m)   Wt 284 lb 9.8 oz (129.1 kg)   SpO 1245  Gross per 24 hour   Intake —   Output 2200 ml   Net -2200 ml       Patient with hypoxia out of proportion to exam and despite diuresis (-2.2 L last 24 hrs).   Nuclear stress test is a low risk test with only a small fixed defect noted in inferior wall

## 2019-12-05 NOTE — PROGRESS NOTES
Lexiscan nuclear stress test complete. No arrhythmias or ST depression noted. Pt denied cardiac symptoms. Final report pending cardiology review.

## 2019-12-05 NOTE — PROGRESS NOTES
[facility]  Progress Note     Shey Gore Patient Status:  Inpatient    1950 MRN [id number]   [facility] 8NE-A Attending Danie Rubio MD   Hosp Day # 2 PCP Joseluis Bray MD     Chief Complaint: BOX    S: Patient just retu lisinopril  10 mg Oral Daily   • atorvastatin  10 mg Oral Nightly   • enoxaparin  0.5 mg/kg Subcutaneous Daily   • Insulin Aspart Pen  1-10 Units Subcutaneous TID AC and HS   • Insulin Aspart Pen  1-68 Units Subcutaneous TID CC       ASSESSMENT / PLAN:

## 2019-12-05 NOTE — PLAN OF CARE
Assumed care of patient @ 0730, A/OX4, answers questions. Adm for SOB and CHF. NSR w/PACs on tele, denies complaints of CP or SOB. Pt and sister understands POC, currently does not wear home o2, 2-4L while sitting, needs 6L when ambulating.  CPAP protocol p

## 2019-12-05 NOTE — PLAN OF CARE
Assumed care of pt around 1930. Pt Aox4. 3L NC. Denies pain.  at bedside. Respiratory placed CPAP at bedside, pt agreeable to wearing tonight. Pt intermittently desaturating with CPAP on, O2 turned up on machine, no improvement.  updated Dr. Mega Lucio, oxygenation  Description  INTERVENTIONS:  - Assess for changes in respiratory status  - Assess for changes in mentation and behavior  - Position to facilitate oxygenation and minimize respiratory effort  - Oxygen supplementation based on oxygen saturation

## 2019-12-05 NOTE — RESPIRATORY THERAPY NOTE
HEATH - Equipment Use Daily Summary:  · Set Mode CFLEX  · Usage in hours: 7:54  · 90% Pressure (EPAP) level: 11.0  · 90% Insp Pressure (IPAP):   · AHI: 3.4  · Supplemental Oxygen:   · Comments:

## 2019-12-05 NOTE — PAYOR COMM NOTE
--------------  CONTINUED STAY REVIEW    Nelson Bledsoe MA [facility]  Subscriber #:  [id number]  Authorization Number: [id number]    Admit date: 12/3/19  Admit time: 5598        12/4:    PULM CONSULT:  Reason for Consultation:  hypoxia     History of Present Illnes hypoventilation syndrome.  Echo without evidence of pulm HTN  · CHF exacerbation   · DM  · HTN, HLD  · Obesity, suspicious for HEATH and/or obesity hypoventilation     PLAN     · Obtain ABG and bedside spirometry  · Continue diuresis as tolerated  · Wean o2 f 7769 Given 12.5 mg Oral Marielena Newsome RN    12/4/2019 1644 Given 12.5 mg Oral Janice Chao RN      Perflutren Lipid Microsphere (DEFINITY) 6.52 MG/ML injection 1.5 mL     Date Action Dose Route User    12/4/2019 1126 Given 1.5 mL Intravenous Azalia

## 2019-12-06 PROCEDURE — 99232 SBSQ HOSP IP/OBS MODERATE 35: CPT | Performed by: INTERNAL MEDICINE

## 2019-12-06 PROCEDURE — 99233 SBSQ HOSP IP/OBS HIGH 50: CPT | Performed by: INTERNAL MEDICINE

## 2019-12-06 RX ORDER — LISINOPRIL 2.5 MG/1
2.5 TABLET ORAL DAILY
Status: DISCONTINUED | OUTPATIENT
Start: 2019-12-07 | End: 2019-12-10

## 2019-12-06 RX ORDER — TORSEMIDE 20 MG/1
20 TABLET ORAL
Status: DISCONTINUED | OUTPATIENT
Start: 2019-12-06 | End: 2019-12-10

## 2019-12-06 RX ORDER — ACETAZOLAMIDE 250 MG/1
250 TABLET ORAL DAILY
Status: DISCONTINUED | OUTPATIENT
Start: 2019-12-06 | End: 2019-12-09

## 2019-12-06 NOTE — PROGRESS NOTES
12/06/19 0250 12/06/19 0251 12/06/19 0252   Vital Signs   Pulse 63 58 59   Oxygen Therapy   SpO2 (!) 87 % (!) 86 % (!) 87 %   O2 Device CPAP CPAP CPAP      12/06/19 0253 12/06/19 0254 12/06/19 0255   Vital Signs   Pulse 56 58 61   Oxygen Therapy   S

## 2019-12-06 NOTE — PROGRESS NOTES
[facility]  Progress Note    Miguel Ramos Patient Status:  Inpatient    1950 MRN [id number]   [facility] 8NE-A Attending Chacha Flores MD   Hosp Day # 2 PCP Laura Scott MD     Subjective:  Miguel Ramos is a(n) 71year old f DEV Nasal cannula   THGB 15.4       Invalid input(s): ANGELA [facility]with [facility]      Radiology:  CXR 12/5 - reviewed - no significant change except perhaps mild left sided ateletasis    Medications reviewed.     ASSESSMENT:  · Acute hypoxia

## 2019-12-06 NOTE — CM/SW NOTE
Spoke with regi with tl--anticipate need for trilogy / Abby Palacios @ discharge--demographic and supporting documentation faxed to tl [phone] order form to place on shadow chart for pulm to complete

## 2019-12-06 NOTE — PLAN OF CARE
Assumed care of patient at 1900. Patient a&ox4. 2LO2. CPAP at [facility]. . Denies any pain. SR on tele. IV Lasix bid. Daily weight. Accuchecks qid. Up ad maira. Patient updated on poc, questions answered. Will continue to monitor.

## 2019-12-06 NOTE — PROGRESS NOTES
Pt De sat's on room air.         12/06/19 0508 12/06/19 0509 12/06/19 0510   Vital Signs   Pulse 67 83 83   Oxygen Therapy   SpO2 93 % (!) 86 % (!) 80 %   O2 Device CPAP None (Room air) None (Room air)      12/06/19 0511 12/06/19 0512 12/06/19 0513   Vital

## 2019-12-06 NOTE — PROGRESS NOTES
[facility]  Progress Note     Loretta Pena Patient Status:  Inpatient    1950 MRN [id number]   [facility] 8NE-A Attending Mylene Pennington MD   Hosp Day # 3 PCP Dominik Lopez MD     Chief Complaint: SOB  S: Patient overall doi mg Oral Daily   • metoprolol succinate  12.5 mg Oral Daily Beta Blocker   • atorvastatin  10 mg Oral Nightly   • enoxaparin  0.5 mg/kg Subcutaneous Daily   • Insulin Aspart Pen  1-10 Units Subcutaneous TID AC and HS   • Insulin Aspart Pen  1-68 Units Subcu
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
## 2019-12-06 NOTE — PROGRESS NOTES
[facility]  Progress Note    Yulissa Boone Patient Status:  Inpatient    1950 MRN [id number]   [facility] 8NE-A Attending Rosa Desai MD   Hosp Day # 3 PCP Amanda Carl MD     Subjective:  Yulissa Boone is a(n) 71year old f 118* 128* 107*   BUN 15 19* 22*   CREATSERUM 0.94 1.05* 1.12*   GFRAA 72 63 58*   GFRNAA 62 54* 50*   CA 9.5 8.9 9.1    141 140   K 4.8 4.1 3.8    100 94*   CO2 34.0* 38.0* 41.0*         Recent Labs   Lab 12/06/19  0718   ABGPHT 7.38   [id number]

## 2019-12-06 NOTE — PROCEDURES
Shalom Kenney is a 61-year-old  female who stands 5 feet 5 inches tall and weighs 297 pounds. She underwent simple spirometry on 12/4/2019. She carries a diagnosis of dyspnea. She has less than a 1-pack-year smoking history.   She no longer smokes

## 2019-12-06 NOTE — PLAN OF CARE
Tele monitoring. Iv lasix. 40 mg bid. Trilogy trial. Possible discharge in am. 1800 ada. Gluco scan qid. Lovenox for dvt prophylaxis. A1c 6.5. daily weight. Ef 60-65%.  Pt states that the trilogy is much more difficult to tolerate and she feels like she \"c coronary artery perfusion - ex.  Angina  - Evaluate fluid balance, assess for edema, trend weights  Outcome: Progressing  Goal: Absence of cardiac arrhythmias or at baseline  Description  INTERVENTIONS:  - Continuous cardiac monitoring, monitor vital signs,

## 2019-12-06 NOTE — PROGRESS NOTES
[facility]  Cardiology Progress Note    Subjective:  No chest pain, desats and has SOB when on room air. Currently on 2 C    md exam:  No sob at rest. Last time she could walk even a third of a block without being sob was >2 years ago. No cp.      Ob desaturate on room air. Started on trilogy positive pressure ventilation. Believed to be multifactorial (HFpEF, acute respiratory failure, hypercarbia). · HFpEF: patient remains on IV diuretics. NET IO is around -11.5L.  fluid overload likely contributing

## 2019-12-07 ENCOUNTER — APPOINTMENT (OUTPATIENT)
Dept: GENERAL RADIOLOGY | Facility: HOSPITAL | Age: 69
DRG: 291 | End: 2019-12-07
Attending: INTERNAL MEDICINE
Payer: MEDICARE

## 2019-12-07 PROCEDURE — 99232 SBSQ HOSP IP/OBS MODERATE 35: CPT | Performed by: INTERNAL MEDICINE

## 2019-12-07 PROCEDURE — 71045 X-RAY EXAM CHEST 1 VIEW: CPT | Performed by: INTERNAL MEDICINE

## 2019-12-07 NOTE — PROGRESS NOTES
[facility]  Progress Note     Power Bhatt Patient Status:  Inpatient    1950 MRN [id number]   [facility] 8NE-A Attending Christelle Araiza MD   Hosp Day # 4 PCP Melissa Brown MD     Chief Complaint: SOB    S: Patient without a Oral Daily   • metoprolol succinate  12.5 mg Oral Daily Beta Blocker   • atorvastatin  10 mg Oral Nightly   • enoxaparin  0.5 mg/kg Subcutaneous Daily   • Insulin Aspart Pen  1-10 Units Subcutaneous TID AC and HS   • Insulin Aspart Pen  1-68 Units Subcutan

## 2019-12-07 NOTE — PROGRESS NOTES
[facility]  Progress Note    Susan Dai     Subjective:  No chest pain or shortness of breath. Comfortable night.        Intake/Output:    Intake/Output Summary (Last 24 hours) at 12/7/2019 0655  Last data filed at 12/7/2019 0452  Gross per 24 hour Or  Glucose-Vitamin C (DEX-4) chewable tab 4 tablet, 4 tablet, Oral, Q15 Min PRN    Or  dextrose 50 % injection 50 mL, 50 mL, Intravenous, Q15 Min PRN    Or  glucose (DEX4) oral liquid 30 g, 30 g, Oral, Q15 Min PRN    Or  Glucose-Vitamin C (DEX-4) chewable

## 2019-12-07 NOTE — CM/SW NOTE
11:11am  MSW spoke to bedside Rn. She states Dr Nafisa Jin present, form not signed yet for trilogy because MD still adjusting settings. MSW asked Rn to page [address] when form completed. Rn states to follow for home 02 as well.  States that she documented walk in

## 2019-12-07 NOTE — PROGRESS NOTES
[facility]  Progress Note    Irma Kaba Patient Status:  Inpatient    1950 MRN [id number]   [facility] 8NE-A Attending Curtis Dangelo MD   Hosp Day # 4 PCP Chace Ferguson MD     Subjective:  Irma Kaba is a(n) 71year old f Lab 12/03/19  1203   WBC 8.5   HGB 15.2   HCT 49.6*   .0       Recent Labs   Lab 12/04/19  0526 12/06/19  0539 12/07/19  0534   * 107* 110*   BUN 19* 22* 26*   CREATSERUM 1.05* 1.12* 1.12*   GFRAA 63 58* 58*   GFRNAA 54* 50* 50*   CA 8.9 9.

## 2019-12-07 NOTE — PROGRESS NOTES
O2 walk - 89% at rest room air  85 % ambulating in hallway at 100 ft on room air  94% on 2L/NC ambulating in hallway 150 ft    Dr. Jd Davis aware  Notified  will need oxygen for home

## 2019-12-07 NOTE — PLAN OF CARE
Pt here w/CHF, hypoxia. PC02 74. Pt is a/o x4. O2 sats 89-92% on 4L NC, AVAPs at night. NSR on tele. EF 60-65%. (B) LE red, flaky. 2+ edema (R), 1+ on (L). Voids. Denies pain. Up w/sba.  CHF nutrition discussed in depth for patient and pt's  at [facility] Progressing  Goal: Absence of cardiac arrhythmias or at baseline  Description  INTERVENTIONS:  - Continuous cardiac monitoring, monitor vital signs, obtain 12 lead EKG if indicated  - Evaluate effectiveness of antiarrhythmic and heart rate control medicati

## 2019-12-08 PROCEDURE — 99232 SBSQ HOSP IP/OBS MODERATE 35: CPT | Performed by: INTERNAL MEDICINE

## 2019-12-08 RX ORDER — POTASSIUM CHLORIDE 20 MEQ/1
40 TABLET, EXTENDED RELEASE ORAL EVERY 4 HOURS
Status: COMPLETED | OUTPATIENT
Start: 2019-12-08 | End: 2019-12-08

## 2019-12-08 NOTE — PROGRESS NOTES
[facility]  Progress Note     Power Bhatt Patient Status:  Inpatient    1950 MRN [id number]   [facility] 8NE-A Attending Christelle Araiza MD   Hosp Day # 5 PCP Melissa Brown MD     Chief Complaint: hypoxia    S: Patient comfo (Diuretic)   • acetaZOLAMIDE  250 mg Oral Daily   • lisinopril  2.5 mg Oral Daily   • metoprolol succinate  12.5 mg Oral Daily Beta Blocker   • atorvastatin  10 mg Oral Nightly   • enoxaparin  0.5 mg/kg Subcutaneous Daily   • Insulin Aspart Pen  1-10 Units

## 2019-12-08 NOTE — PROGRESS NOTES
[facility]  Progress Note    Torsten Escobedo Patient Status:  Inpatient    1950 MRN [id number]   [facility] 8NE-A Attending Alexia Treviño MD   Hosp Day # 5 PCP Zafar Landa MD       Assessment and Plan:  Patient Active Problem L Known Allergies    Physical Exam:  Blood pressure 114/67, pulse 63, temperature 97.5 °F (36.4 °C), temperature source Axillary, resp. rate 18, height 5' 5\" (1.651 m), weight 278 lb 14.1 oz (126.5 kg), SpO2 99 %.   Telemetry: reviewed and in NSR  General: A Pen (NOVOLOG) 100 UNIT/ML flexpen 1-68 Units, 1-68 Units, Subcutaneous, TID CC        Aden Briones MD  12/8/2019  8:05 AM

## 2019-12-08 NOTE — PLAN OF CARE
Assumed care this am. A/O. States breathing has slowly but steadily improved. BOX. Sats on 2L and desats frequently with minimal activity and when sleeping but does not sustain. Lungs dim. SR on tele.  Cleared for discharge once home O2 and AVAPS are set up weights  Outcome: Progressing  Goal: Absence of cardiac arrhythmias or at baseline  Description  INTERVENTIONS:  - Continuous cardiac monitoring, monitor vital signs, obtain 12 lead EKG if indicated  - Evaluate effectiveness of antiarrhythmic and heart rat

## 2019-12-08 NOTE — PLAN OF CARE
Resumed care of patient 12/7/19 1930. Pt remains alert, oriented x4. O2 sats 92-94% on 1L NC, desat on RA and with activity. AVAPs tolerated well overnight. NSR on tele. Denies pain. Up w/standby assist. Plan: set up AVAPs and home O2.      Problem: Diabete of cardiac arrhythmias or at baseline  Description  INTERVENTIONS:  - Continuous cardiac monitoring, monitor vital signs, obtain 12 lead EKG if indicated  - Evaluate effectiveness of antiarrhythmic and heart rate control medications as ordered  - Initiate

## 2019-12-08 NOTE — PROGRESS NOTES
[facility]  Progress Note    Dena Vargas Patient Status:  Inpatient    1950 MRN [id number]   [facility] 8NE-A Attending Lars Woodward MD   Hosp Day # 5 PCP Patricia Carrington MD     Subjective:  Dena Vargas is a(n) 71year old f HGB 15.2   HCT 49.6*   .0       Recent Labs   Lab 12/06/19  0539 12/07/19  0534 12/08/19  0509   * 110* 124*   BUN 22* 26* 37*   CREATSERUM 1.12* 1.12* 1.27*   GFRAA 58* 58* 50*   GFRNAA 50* 50* 43*   CA 9.1 9.0 9.0    140 138   K 3.8

## 2019-12-09 PROCEDURE — 99232 SBSQ HOSP IP/OBS MODERATE 35: CPT | Performed by: INTERNAL MEDICINE

## 2019-12-09 RX ORDER — METOPROLOL SUCCINATE 25 MG/1
12.5 TABLET, EXTENDED RELEASE ORAL
Qty: 30 TABLET | Refills: 2 | Status: SHIPPED | OUTPATIENT
Start: 2019-12-10 | End: 2020-03-16

## 2019-12-09 RX ORDER — LISINOPRIL 2.5 MG/1
2.5 TABLET ORAL DAILY
Qty: 30 TABLET | Refills: 2 | Status: ON HOLD | OUTPATIENT
Start: 2019-12-09 | End: 2020-01-01

## 2019-12-09 RX ORDER — TORSEMIDE 20 MG/1
20 TABLET ORAL
Qty: 60 TABLET | Refills: 1 | Status: SHIPPED | OUTPATIENT
Start: 2019-12-09 | End: 2019-12-10

## 2019-12-09 NOTE — PROGRESS NOTES
[facility] HOSPITALIST  Progress Note     Derrill Gain Patient Status:  Inpatient    1950 MRN [id number]   [facility] 8NE-A Attending Merlene Villareal MD   Hosp Day # 6 PCP Rowena Kohli MD     Chief Complaint:hypoxia   S: Patient overall torsemide  20 mg Oral BID (Diuretic)   • lisinopril  2.5 mg Oral Daily   • metoprolol succinate  12.5 mg Oral Daily Beta Blocker   • atorvastatin  10 mg Oral Nightly   • enoxaparin  0.5 mg/kg Subcutaneous Daily   • Insulin Aspart Pen  1-10 Units Subcutaneo

## 2019-12-09 NOTE — CM/SW NOTE
Signed order for AVAPS as well as oxygen faxed to [facility] [address] [phone]--=await approval--spoke with representative regi to update of discharge

## 2019-12-09 NOTE — PLAN OF CARE
Patient aox3, 1-2 l at rest,no edema. NSR, lungs diminished, IS 1500. Needs AVAPS at home. PT to evaluated prior to dc. Patient desats when walking around the floor. 4l while walking. Ordered PFT's at bedside for insurance approval for AVAPS at home.

## 2019-12-09 NOTE — PROGRESS NOTES
12/09/19 1100   Results at rest   Resting SpO2 (minimum) 86 %   Resting HR (max) 63   Resting Oxygen Device None   Resting O2 flow (liters per minute) 0   Results with Ambulation   Ambulation SpO2 (minimum) 91 %   Ambulation HR (max) 78   Ambulation oxy

## 2019-12-09 NOTE — PHYSICAL THERAPY NOTE
PHYSICAL THERAPY QUICK EVALUATION - INPATIENT    Room Number: 1913/5717-I  Evaluation Date: 12/9/2019  Presenting Problem: SOB  Physician Order: PT Eval and Treat    Problem List  Principal Problem:    Acute diastolic heart failure ([address]  Active Problems O2 WALK        SPO2 Ambulation on Oxygen: 94  Ambulation oxygen flow (liters per minute): 4      AM-PAC '6-Clicks' INPATIENT SHORT FORM - BASIC MOBILITY  How much difficulty does the patient currently have. ..  -   Turning over in bed (including adjusti comorbidities and personal factors impacting therapy include HTN, CHF, DM, DL  Functional outcome measures completed include The AM-PAC '6-Clicks' Inpatient Basic Mobility Short Form was completed and this patient is demonstrating a 0% degree of impairment

## 2019-12-09 NOTE — PLAN OF CARE
Problem: Diabetes/Glucose Control  Goal: Glucose maintained within prescribed range  Description  INTERVENTIONS:  - Monitor Blood Glucose as ordered  - Assess for signs and symptoms of hyperglycemia and hypoglycemia  - Administer ordered medications to m bleeding, hypotension and signs of decreased cardiac output  - Evaluate effectiveness of vasoactive medications to optimize hemodynamic stability  - Monitor arterial and/or venous puncture sites for bleeding and/or hematoma  - Assess quality of pulses, ski

## 2019-12-09 NOTE — PROGRESS NOTES
[facility]  Progress Note    Han Malcolm Patient Status:  Inpatient    1950 MRN [id number]   [facility] 8NE-A Attending Nimo Galeana MD   Hosp Day # 6 PCP Stcaia Henderson MD     Subjective:  Han Malcolm is a(n) 71year old f 12/09/19  0548   * 124*  --  121*   BUN 26* 37*  --  42*   CREATSERUM 1.12* 1.27*  --  1.30*   GFRAA 58* 50*  --  48*   GFRNAA 50* 43*  --  42*   CA 9.0 9.0  --  8.8    138  --  138   K 3.5 3.4* 4.2 3.9   CL 96* 96*  --  100   CO2 40.0* 37.0*

## 2019-12-09 NOTE — DISCHARGE SUMMARY
[facility] H[facility]PITALIST  Progress Note     Suri Pina Patient Status:  Inpatient    1950 MRN [id number]   [facility] 8NE-A Attending Alma Steinberg MD   Hosp Day # 7 PCP Vero Lau MD     Chief Complaint: hypoxia    S: Patient Sonja Yepez in [address] [place] [address] Rd.     Medications:   • [START ON 12/11/2019] torsemide  20 mg Oral Daily   • [START ON 12/11/2019] acetaZOLAMIDE  250 mg Oral Daily   • Miconazole Nitrate   Topical [email or web address]   • lisinopril  2.5 mg Oral Daily   • metoprolol succinate  12.5 mg Ora

## 2019-12-09 NOTE — PLAN OF CARE
Assumed care of pt. At [address]. Pt. Is AxOx4 and on 1 L of O2 during the day with O2 sat of 96%. Pt. Has a L ear hearing aid. Pt. Wears AVAPS at night. Pt.'s bilateral breath sounds are diminished but clear. Pt. Reaches 1250. Pt.  Has NSR/sinus giuliana with a cu optimize hemodynamic stability  - Monitor arterial and/or venous puncture sites for bleeding and/or hematoma  - Assess quality of pulses, skin color and temperature  - Assess for signs of decreased coronary artery perfusion - ex.  Angina  - Evaluate fluid b

## 2019-12-09 NOTE — PROGRESS NOTES
[facility]  Cardiology Progress Note    Lisa Jewell Patient Status:  Inpatient    1950 MRN [id number]   [facility] 8NE-A Attending Yoni Aburto MD   Hosp Day # 6 PCP Divya Calzada MD     Subjective:  Sitting up in chair, hop Beta Blocker   • atorvastatin  10 mg Oral Nightly   • enoxaparin  0.5 mg/kg Subcutaneous Daily   • Insulin Aspart Pen  1-10 Units Subcutaneous TID AC and HS   • Insulin Aspart Pen  1-68 Units Subcutaneous TID CC         Assessment:  · Hypoxia-believed to b

## 2019-12-10 VITALS
DIASTOLIC BLOOD PRESSURE: 56 MMHG | TEMPERATURE: 99 F | RESPIRATION RATE: 20 BRPM | HEART RATE: 57 BPM | BODY MASS INDEX: 46.13 KG/M2 | SYSTOLIC BLOOD PRESSURE: 110 MMHG | WEIGHT: 276.88 LBS | OXYGEN SATURATION: 94 % | HEIGHT: 65 IN

## 2019-12-10 PROCEDURE — 99239 HOSP IP/OBS DSCHRG MGMT >30: CPT | Performed by: INTERNAL MEDICINE

## 2019-12-10 PROCEDURE — 99232 SBSQ HOSP IP/OBS MODERATE 35: CPT | Performed by: INTERNAL MEDICINE

## 2019-12-10 RX ORDER — ACETAZOLAMIDE 250 MG/1
250 TABLET ORAL DAILY
Qty: 30 TABLET | Refills: 1 | Status: ON HOLD | OUTPATIENT
Start: 2019-12-11 | End: 2020-01-01

## 2019-12-10 RX ORDER — POTASSIUM CHLORIDE 20 MEQ/1
20 TABLET, EXTENDED RELEASE ORAL DAILY
Qty: 30 TABLET | Refills: 1 | Status: ON HOLD | OUTPATIENT
Start: 2019-12-10 | End: 2020-01-01

## 2019-12-10 RX ORDER — POTASSIUM CHLORIDE 20 MEQ/1
40 TABLET, EXTENDED RELEASE ORAL EVERY 4 HOURS
Status: COMPLETED | OUTPATIENT
Start: 2019-12-10 | End: 2019-12-10

## 2019-12-10 RX ORDER — ACETAZOLAMIDE 250 MG/1
250 TABLET ORAL DAILY
Status: DISCONTINUED | OUTPATIENT
Start: 2019-12-11 | End: 2019-12-10

## 2019-12-10 RX ORDER — TORSEMIDE 20 MG/1
20 TABLET ORAL DAILY
Qty: 30 TABLET | Refills: 1 | Status: SHIPPED | OUTPATIENT
Start: 2019-12-11 | End: 2020-03-16

## 2019-12-10 RX ORDER — TORSEMIDE 20 MG/1
20 TABLET ORAL DAILY
Status: DISCONTINUED | OUTPATIENT
Start: 2019-12-11 | End: 2019-12-10

## 2019-12-10 NOTE — PROGRESS NOTES
Chief Complaint: hypoxia    S: Patient without any CP/SOB/N/V/dizziness    Review of Systems:   10 point ROS completed and was negative, except for pertinent positive and negatives stated in subjective.     Vital signs:  Temp:  [97.8 °F (36.6 °C)-98.7 °F Oral Daily   • metoprolol succinate  12.5 mg Oral Daily Beta Blocker   • atorvastatin  10 mg Oral Nightly   • enoxaparin  0.5 mg/kg Subcutaneous Daily   • Insulin Aspart Pen  1-10 Units Subcutaneous TID AC and HS   • Insulin Aspart Pen  1-68 Units Subcutan

## 2019-12-10 NOTE — PROGRESS NOTES
[facility]  Progress Note    Power Bhatt Patient Status:  Inpatient    1950 MRN [id number]   [facility] 8NE-A Attending Christelle Araiza MD   Hosp Day # 7 PCP Melissa Brown MD     Subjective:  Power Bhatt is a(n) 71year old f 12/09/19  0548 12/10/19  0515   *  --  121* 119*   BUN 37*  --  42* 47*   CREATSERUM 1.27*  --  1.30* 1.32*   GFRAA 50*  --  48* 47*   GFRNAA 43*  --  42* 41*   CA 9.0  --  8.8 8.8     --  138 138   K 3.4* 4.2 3.9 3.5   CL 96*  --  100 100   C

## 2019-12-10 NOTE — PROGRESS NOTES
[facility]  Cardiology Progress Note    Roberto Tapia Patient Status:  Inpatient    1950 MRN [id number]   [facility] 8NE-A Attending Porfirio Alcaraz MD   Hosp Day # 7 PCP Columbus Rubinstein, MD     Subjective:  Sitting up in a chair.  D metoprolol succinate  12.5 mg Oral Daily Beta Blocker   • atorvastatin  10 mg Oral Nightly   • enoxaparin  0.5 mg/kg Subcutaneous Daily   • Insulin Aspart Pen  1-10 Units Subcutaneous TID AC and HS   • Insulin Aspart Pen  1-68 Units Subcutaneous TID CC

## 2019-12-10 NOTE — PLAN OF CARE
Sinus rhythm; sinus giuliana deny chest pain ; deny dyspnea  at rest  per patient dyspnea on exertion is less  02 sat on 2l/ nasal cannula stable mid 90's  will go home on 02 and avaps  Both 02 and avaps arranged by nurse allie bae -  Emery hygiene including cough, deep breathe, Incentive Spirometry  - Assess the need for suctioning and perform as needed  - Assess and instruct to report SOB or any respiratory difficulty  - Respiratory Therapy support as indicated  - Manage/alleviate anxiety

## 2019-12-10 NOTE — HOME CARE LIAISON
Met with patient at the bedside to offer home health choice. Patient declined [address] Residential brochure provided with contact information. All questions addressed and answered.

## 2019-12-10 NOTE — PROCEDURES
Felipa Chirinos is a 77-year-old  female who stands 5 feet 5 inches tall and weighs 297 pounds. She underwent simple spirometry on 12/4/2019. She carries a diagnosis of dyspnea. She reports less than 1-pack-year smoking history.   Results are as fol

## 2019-12-10 NOTE — PLAN OF CARE
ALERT AND ORIENTED X4 ON TELE MONITOR HR 50'S SINUS SAHRA. AVAP AT HS IN USED. DENIES ANY PAIN. UPDATED W/ POC AND VERBALIZED UNDERSTANDING. ALL NEEDS ATTENDED AND WILL CONTINUE TO MONITOR. CALL LIGHT WITHIN REACH.   Problem: Impaired Functional Mobility  G

## 2019-12-10 NOTE — CM/SW NOTE
sw spoke to Francy Landaverde and was informed that Mary Arnulfocaridad will provide the AVAPS for home as he has spoken to pt and pt is willing to place a credit card on file and understands there may be a financial liability for the device if insurance denies.      Saloni Machado is

## 2019-12-11 ENCOUNTER — PATIENT OUTREACH (OUTPATIENT)
Dept: CASE MANAGEMENT | Age: 69
End: 2019-12-11

## 2019-12-11 DIAGNOSIS — Z02.9 ENCOUNTERS FOR UNSPECIFIED ADMINISTRATIVE PURPOSE: ICD-10-CM

## 2019-12-11 DIAGNOSIS — I50.31 ACUTE DIASTOLIC HEART FAILURE (HCC): ICD-10-CM

## 2019-12-11 PROCEDURE — 1111F DSCHRG MED/CURRENT MED MERGE: CPT

## 2019-12-11 NOTE — PROGRESS NOTES
NURSING DISCHARGE NOTE    Discharged Home via Wheelchair. Accompanied by Spouse  Belongings Taken by patient/family. Telemetry dc'd. IV removed, catheter intact. Discharge teaching completed, pt verbalized understanding.

## 2019-12-11 NOTE — PROGRESS NOTES
Initial Post Discharge Follow Up   Discharge Date: 12/10/19  Contact Date: 12/11/2019    Consent Verification:  Assessment Completed With: Patient  HIPAA Verified?   Yes    Discharge Dx:  Acute diastolic heart failure    Was TCC ordered: yes    General: Oral Tab Take 1 tablet (20 mg total) by mouth daily. 30 tablet 1   • lisinopril 2.5 MG Oral Tab Take 1 tablet (2.5 mg total) by mouth daily.  30 tablet 2   • Metoprolol Succinate ER 25 MG Oral Tablet 24 Hr Take 0.5 tablets (12.5 mg total) by mouth Daily Bet daily? No  What was your weight yesterday? 276 lbs in the hospital   Today? Did not check  Were you told about any fluid restrictions? Yes, NCM also reviewed with patient  Have you noticed any shortness of breath or waking up short of breath?  no  Since [facility] to the hospital, she is to return to the ER. Patient understands and denies similar symptoms at time of call.   NCM reviewed with patient the importance of daily weights - to check their weight in the morning after urinating and before breakfast in the arbam

## 2019-12-12 ENCOUNTER — TELEPHONE (OUTPATIENT)
Dept: CARDIOLOGY | Age: 69
End: 2019-12-12

## 2019-12-12 DIAGNOSIS — I10 HYPERTENSION, UNSPECIFIED TYPE: Primary | ICD-10-CM

## 2019-12-12 PROBLEM — E11.9 TYPE 2 DIABETES MELLITUS, WITHOUT LONG-TERM CURRENT USE OF INSULIN (HCC): Status: ACTIVE | Noted: 2019-12-12

## 2019-12-12 PROBLEM — N18.30 CKD (CHRONIC KIDNEY DISEASE) STAGE 3, GFR 30-59 ML/MIN (HCC): Chronic | Status: ACTIVE | Noted: 2019-12-12

## 2019-12-12 PROBLEM — E78.5 DYSLIPIDEMIA: Status: ACTIVE | Noted: 2019-12-12

## 2019-12-12 PROBLEM — N17.9 AKI (ACUTE KIDNEY INJURY): Status: ACTIVE | Noted: 2019-12-12

## 2019-12-12 PROBLEM — N17.9 AKI (ACUTE KIDNEY INJURY) (HCC): Status: ACTIVE | Noted: 2019-12-12

## 2019-12-12 NOTE — PAYOR COMM NOTE
--------------  DISCHARGE REVIEW    Ashleigh Echavarria MA O  Subscriber #:  [id number]  Authorization Number: [id number]    Admit date: 12/3/19  Admit time:  3443  Discharge Date: 12/10/2019  7:29 PM     Admitting Physician: DO Jammie Restrepo

## 2019-12-12 NOTE — PROGRESS NOTES
[address] [facility] CARE CLINIC      HISTORY   CHIEF COMPLAINT: post hospital follow up visit  HPI: Lisa Jewell is a 71year old female here today for follow up after being hospitalized for dyspnea.  Lisa Jewell was discharged from PREP) Does not apply Pads, Use twice daily when checking blood sugar E11.9, Disp: 200 each, Rfl: 3  Glucose Blood (ACCU-CHEK SMARTVIEW) In Vitro Strip, Use to test blood sugar twice a day .  E11.9, Disp: 100 strip, Rfl: 1  Pravastatin Sodium 40 MG Oral Tab, (cpt=71045)    Result Date: 12/7/2019  CONCLUSION:  Shallow inspiration with possible vascular congestion. Stable lingular atelectasis.     Dictated by: Teresa Man MD on 12/07/2019 at 8:06     Approved by: Teresa Man MD on 12/07/2019 at 8:07 23 09/10/2018       ROS:  GENERAL: weight stable, energy stable, denies fever, weakness  RESPIRATORY: denies cough, denies dyspnea with exertion  CARDIOVASCULAR: denies syncope, chest pain, fatigue, palpitations   GI: denies abdominal pain  MUSCULOSKELETAL Take 1 tablet (250 mg total) by mouth daily. , Disp: 30 tablet, Rfl: 1  torsemide 20 MG Oral Tab, Take 1 tablet (20 mg total) by mouth daily. , Disp: 30 tablet, Rfl: 1  lisinopril 2.5 MG Oral Tab, Take 1 tablet (2.5 mg total) by mouth daily. , Disp: 30 tablet by me.  See medication list for additions of new medication, and changes to current doses of medications and discontinued medications.     I certify that the following are true:  Communication with the patient was made within 2 business days of discharge on

## 2019-12-13 ENCOUNTER — OFFICE VISIT (OUTPATIENT)
Dept: INTERNAL MEDICINE CLINIC | Facility: CLINIC | Age: 69
End: 2019-12-13
Payer: MEDICARE

## 2019-12-13 VITALS
BODY MASS INDEX: 45.32 KG/M2 | HEIGHT: 65 IN | HEART RATE: 73 BPM | RESPIRATION RATE: 18 BRPM | OXYGEN SATURATION: 97 % | SYSTOLIC BLOOD PRESSURE: 100 MMHG | WEIGHT: 272 LBS | TEMPERATURE: 98 F | DIASTOLIC BLOOD PRESSURE: 58 MMHG

## 2019-12-13 DIAGNOSIS — I50.9 ACUTE CONGESTIVE HEART FAILURE, UNSPECIFIED HEART FAILURE TYPE (HCC): ICD-10-CM

## 2019-12-13 DIAGNOSIS — R09.02 HYPOXIA: Primary | ICD-10-CM

## 2019-12-13 DIAGNOSIS — E78.5 DYSLIPIDEMIA: ICD-10-CM

## 2019-12-13 DIAGNOSIS — E11.9 TYPE 2 DIABETES MELLITUS WITHOUT COMPLICATION, WITHOUT LONG-TERM CURRENT USE OF INSULIN (HCC): ICD-10-CM

## 2019-12-13 DIAGNOSIS — N18.30 CKD (CHRONIC KIDNEY DISEASE) STAGE 3, GFR 30-59 ML/MIN (HCC): Chronic | ICD-10-CM

## 2019-12-13 DIAGNOSIS — E66.01 MORBID OBESITY (HCC): ICD-10-CM

## 2019-12-13 DIAGNOSIS — I10 ESSENTIAL HYPERTENSION: ICD-10-CM

## 2019-12-13 DIAGNOSIS — N17.9 AKI (ACUTE KIDNEY INJURY) (HCC): ICD-10-CM

## 2019-12-13 PROCEDURE — 99495 TRANSJ CARE MGMT MOD F2F 14D: CPT | Performed by: CLINICAL NURSE SPECIALIST

## 2019-12-13 PROCEDURE — 1111F DSCHRG MED/CURRENT MED MERGE: CPT | Performed by: CLINICAL NURSE SPECIALIST

## 2019-12-13 NOTE — PROGRESS NOTES
TRANSITIONAL CARE CLINIC PHARMACIST MEDICATION RECONCILIATION        Yulissa Boone MRN [id number]    1950 PCP Amanda Carl MD       Comments: Medication history completed in [address] [place] [address] by pharmacist with patient.  Patient brought al it. Discussed with outpatient pharmacy and it was discovered that the prescription was sent to them the day prior to discharge and then the dose was changed, but the dose change was sent to a different pharmacy.  Regardless, patient aware she should continu

## 2019-12-13 NOTE — PATIENT INSTRUCTIONS
PATIENT INSTRUCTIONS:   1. Continue to take torsemide 1 tablet daily as instructed on hospital discharge     2. Continue to weigh yourself every day - same time of the day, same clothes.   Report a weight gain of 2 pounds or more in 24 hours to the cardiolo

## 2019-12-17 NOTE — DISCHARGE SUMMARY
[facility] HOSPITALIST  DISCHARGE SUMMARY     Shey Gore Patient Status:  Inpatient    1950 MRN [id number]   [facility] 8NE-A Attending Narayan Boykin MD   [facility] Day # 7 PCP Joseluis Bray MD     Date of Admission: 12/3/2019  Date of D Chloride ER 20 MEQ Tbcr  Commonly known as:  K-DUR M20  Notes to patient:  As directed by yourdoctor; W/ TORSEMIDE      Take 1 tablet (20 mEq total) by mouth daily.  Take only when taking torsemide   Quantity:  30 tablet  Refills:  1     torsemide 20 MG Tab 18740  [phone]    On 12/24/2019  Cardiology hospital follow up appointment on 12/24 at 9:30am    Bertha Baptiste MD  [address] [phone]    In 2 weeks      Laith Ayers MD  [address],

## 2019-12-23 ENCOUNTER — OFFICE VISIT (OUTPATIENT)
Dept: FAMILY MEDICINE CLINIC | Facility: CLINIC | Age: 69
End: 2019-12-23
Payer: MEDICARE

## 2019-12-23 VITALS
HEART RATE: 70 BPM | RESPIRATION RATE: 16 BRPM | SYSTOLIC BLOOD PRESSURE: 112 MMHG | WEIGHT: 267.63 LBS | HEIGHT: 65 IN | DIASTOLIC BLOOD PRESSURE: 70 MMHG | TEMPERATURE: 98 F | BODY MASS INDEX: 44.59 KG/M2

## 2019-12-23 DIAGNOSIS — I50.9 ACUTE CONGESTIVE HEART FAILURE, UNSPECIFIED HEART FAILURE TYPE (HCC): Primary | ICD-10-CM

## 2019-12-23 DIAGNOSIS — E55.9 VITAMIN D DEFICIENCY: ICD-10-CM

## 2019-12-23 DIAGNOSIS — I50.31 ACUTE DIASTOLIC HEART FAILURE (HCC): ICD-10-CM

## 2019-12-23 DIAGNOSIS — E11.9 TYPE 2 DIABETES MELLITUS WITHOUT COMPLICATION, UNSPECIFIED WHETHER LONG TERM INSULIN USE (HCC): ICD-10-CM

## 2019-12-23 DIAGNOSIS — Z00.00 ROUTINE ADULT HEALTH MAINTENANCE: ICD-10-CM

## 2019-12-23 PROBLEM — E78.5 DYSLIPIDEMIA: Status: ACTIVE | Noted: 2019-12-12

## 2019-12-23 PROBLEM — I10 ESSENTIAL HYPERTENSION: Status: ACTIVE | Noted: 2019-12-03

## 2019-12-23 PROCEDURE — 99214 OFFICE O/P EST MOD 30 MIN: CPT | Performed by: FAMILY MEDICINE

## 2019-12-23 PROCEDURE — 1111F DSCHRG MED/CURRENT MED MERGE: CPT | Performed by: FAMILY MEDICINE

## 2019-12-23 RX ORDER — LISINOPRIL 2.5 MG/1
10 TABLET ORAL DAILY
Refills: 1 | COMMUNITY
Start: 2019-09-16 | End: 2020-01-17 | Stop reason: ALTCHOICE

## 2019-12-23 RX ORDER — POTASSIUM CHLORIDE 20 MEQ/1
20 TABLET, EXTENDED RELEASE ORAL
COMMUNITY
Start: 2019-12-10 | End: 2020-01-27 | Stop reason: ALTCHOICE

## 2019-12-23 RX ORDER — PRAVASTATIN SODIUM 40 MG
TABLET ORAL
COMMUNITY
Start: 2019-08-05 | End: 2020-01-27 | Stop reason: SDUPTHER

## 2019-12-23 RX ORDER — TORSEMIDE 20 MG/1
20 TABLET ORAL
COMMUNITY
Start: 2019-12-11 | End: 2020-01-27 | Stop reason: SDUPTHER

## 2019-12-23 RX ORDER — METOPROLOL SUCCINATE 25 MG/1
12.5 TABLET, EXTENDED RELEASE ORAL
COMMUNITY
Start: 2019-12-10 | End: 2020-01-27 | Stop reason: SDUPTHER

## 2019-12-23 RX ORDER — LANCETS
EACH MISCELLANEOUS
COMMUNITY
Start: 2019-10-09 | End: 2020-10-08

## 2019-12-23 RX ORDER — LISINOPRIL 2.5 MG/1
2.5 TABLET ORAL
COMMUNITY
Start: 2019-12-09 | End: 2019-12-23 | Stop reason: SDUPTHER

## 2019-12-23 RX ORDER — ACETAZOLAMIDE 250 MG/1
250 TABLET ORAL
COMMUNITY
Start: 2019-12-11 | End: 2020-01-27 | Stop reason: ALTCHOICE

## 2019-12-23 NOTE — PROGRESS NOTES
HPI:   Pam Hughes is a 71year old female that presents for Patient presents with:  Hospital F/U: from 12/3/2019- Patient has no complaints at the moment, stated is doing well.      Patient is here for follow-up of hospital stay for acute congestive hear Take 1 tablet (850 mg total) by mouth 2 (two) times daily with meals. , Disp: 180 tablet, Rfl: 0  •  ACCU-CHEK FASTCLIX LANCETS Does not apply Misc, 1 lancet by Finger stick route 2 (two) times daily.  E11.9, Disp: 102 each, Rfl: 3  •  Glucose Blood (ACCU-CH all 4 quadrants, no masses, no hepatosplenomegaly. EXTREMITIES:  No edema, no cyanosis, 2+ radial pulses b/l. NEURO:  Grossly normal     ASSESSMENT AND PLAN:      1. Acute congestive heart failure, unspecified heart failure type ([address]    2.  Acute diasto

## 2019-12-24 ENCOUNTER — TELEPHONE (OUTPATIENT)
Dept: FAMILY MEDICINE CLINIC | Facility: CLINIC | Age: 69
End: 2019-12-24

## 2019-12-24 ENCOUNTER — OFFICE VISIT (OUTPATIENT)
Dept: CARDIOLOGY | Age: 69
End: 2019-12-24

## 2019-12-24 VITALS
SYSTOLIC BLOOD PRESSURE: 124 MMHG | HEIGHT: 65 IN | WEIGHT: 268 LBS | DIASTOLIC BLOOD PRESSURE: 68 MMHG | BODY MASS INDEX: 44.65 KG/M2 | HEART RATE: 68 BPM

## 2019-12-24 DIAGNOSIS — I50.9 ACUTE CONGESTIVE HEART FAILURE, UNSPECIFIED HEART FAILURE TYPE (HCC): Primary | ICD-10-CM

## 2019-12-24 DIAGNOSIS — I10 ESSENTIAL HYPERTENSION: ICD-10-CM

## 2019-12-24 DIAGNOSIS — I50.31 ACUTE DIASTOLIC HEART FAILURE (CMD): Primary | ICD-10-CM

## 2019-12-24 DIAGNOSIS — E78.5 DYSLIPIDEMIA: ICD-10-CM

## 2019-12-24 DIAGNOSIS — Z09 HOSPITAL DISCHARGE FOLLOW-UP: ICD-10-CM

## 2019-12-24 DIAGNOSIS — I50.31 ACUTE DIASTOLIC HEART FAILURE (HCC): ICD-10-CM

## 2019-12-24 PROCEDURE — 99214 OFFICE O/P EST MOD 30 MIN: CPT | Performed by: NURSE PRACTITIONER

## 2019-12-24 PROCEDURE — 3078F DIAST BP <80 MM HG: CPT | Performed by: NURSE PRACTITIONER

## 2019-12-24 PROCEDURE — 3074F SYST BP LT 130 MM HG: CPT | Performed by: NURSE PRACTITIONER

## 2019-12-24 ASSESSMENT — PATIENT HEALTH QUESTIONNAIRE - PHQ9
1. LITTLE INTEREST OR PLEASURE IN DOING THINGS: NOT AT ALL
1. LITTLE INTEREST OR PLEASURE IN DOING THINGS: NOT AT ALL
SUM OF ALL RESPONSES TO PHQ9 QUESTIONS 1 AND 2: 0
2. FEELING DOWN, DEPRESSED OR HOPELESS: NOT AT ALL
SUM OF ALL RESPONSES TO PHQ9 QUESTIONS 1 AND 2: 0
SUM OF ALL RESPONSES TO PHQ9 QUESTIONS 1 AND 2: 0
2. FEELING DOWN, DEPRESSED OR HOPELESS: NOT AT ALL

## 2019-12-24 ASSESSMENT — ENCOUNTER SYMPTOMS
SYNCOPE: 0
ORTHOPNEA: 0
RESPIRATORY NEGATIVE: 1
GASTROINTESTINAL NEGATIVE: 1
EYES NEGATIVE: 1
ENDOCRINE NEGATIVE: 1
PSYCHIATRIC NEGATIVE: 1
CONSTITUTIONAL NEGATIVE: 1
NEAR-SYNCOPE: 0
SHORTNESS OF BREATH: 0
NEUROLOGICAL NEGATIVE: 1

## 2019-12-24 NOTE — TELEPHONE ENCOUNTER
Winter Crawford  [facility] Clinical Staff   Cc: [facility] Referral Pool   Phone Number: [phone]             .Reason for the order/referral:F/UP   PCP: Marci Dickerson   Refer to Provider [facility]   Specialty:CARDIOLOGY   Patient Insurance: P

## 2019-12-26 ENCOUNTER — HOSPITAL ENCOUNTER (INPATIENT)
Facility: HOSPITAL | Age: 69
LOS: 6 days | Discharge: HOME HEALTH | DRG: 682 | End: 2020-01-01
Attending: EMERGENCY MEDICINE | Admitting: HOSPITALIST
Payer: MEDICARE

## 2019-12-26 ENCOUNTER — APPOINTMENT (OUTPATIENT)
Dept: GENERAL RADIOLOGY | Facility: HOSPITAL | Age: 69
DRG: 682 | End: 2019-12-26
Attending: EMERGENCY MEDICINE
Payer: MEDICARE

## 2019-12-26 ENCOUNTER — APPOINTMENT (OUTPATIENT)
Dept: CT IMAGING | Facility: HOSPITAL | Age: 69
DRG: 682 | End: 2019-12-26
Attending: EMERGENCY MEDICINE
Payer: MEDICARE

## 2019-12-26 DIAGNOSIS — E87.5 HYPERKALEMIA: ICD-10-CM

## 2019-12-26 DIAGNOSIS — K52.9 GASTROENTERITIS: ICD-10-CM

## 2019-12-26 DIAGNOSIS — N17.9 ACUTE RENAL FAILURE, UNSPECIFIED ACUTE RENAL FAILURE TYPE (HCC): Primary | ICD-10-CM

## 2019-12-26 DIAGNOSIS — E86.0 DEHYDRATION: ICD-10-CM

## 2019-12-26 PROBLEM — E87.2 METABOLIC ACIDOSIS: Status: ACTIVE | Noted: 2019-12-26

## 2019-12-26 PROBLEM — E87.1 HYPONATREMIA: Status: ACTIVE | Noted: 2019-12-26

## 2019-12-26 PROBLEM — E87.20 METABOLIC ACIDOSIS: Status: ACTIVE | Noted: 2019-12-26

## 2019-12-26 LAB
ALBUMIN SERPL-MCNC: 2.8 G/DL (ref 3.4–5)
ALBUMIN SERPL-MCNC: 3.3 G/DL (ref 3.4–5)
ALBUMIN/GLOB SERPL: 0.7 {RATIO} (ref 1–2)
ALBUMIN/GLOB SERPL: 0.8 {RATIO} (ref 1–2)
ALLENS TEST: POSITIVE
ALP LIVER SERPL-CCNC: 45 U/L (ref 55–142)
ALP LIVER SERPL-CCNC: 51 U/L (ref 55–142)
ALT SERPL-CCNC: 15 U/L (ref 13–56)
ALT SERPL-CCNC: 21 U/L (ref 13–56)
ANION GAP SERPL CALC-SCNC: 20 MMOL/L (ref 0–18)
ANION GAP SERPL CALC-SCNC: 22 MMOL/L (ref 0–18)
ANION GAP SERPL CALC-SCNC: 23 MMOL/L (ref 0–18)
ARTERIAL BLD GAS O2 SATURATION: 91 % (ref 92–100)
ARTERIAL BLD GAS O2 SATURATION: 92 % (ref 92–100)
ARTERIAL BLD GAS O2 SATURATION: 94 % (ref 92–100)
ARTERIAL BLD GAS O2 SATURATION: 95 % (ref 92–100)
ARTERIAL BLOOD GAS BASE EXCESS: -13.1 MMOL/L (ref ?–2)
ARTERIAL BLOOD GAS BASE EXCESS: -14.8 MMOL/L (ref ?–2)
ARTERIAL BLOOD GAS BASE EXCESS: -17.7 MMOL/L (ref ?–2)
ARTERIAL BLOOD GAS BASE EXCESS: -22.1 MMOL/L (ref ?–2)
ARTERIAL BLOOD GAS HCO3: 11.1 MEQ/L (ref 22–26)
ARTERIAL BLOOD GAS HCO3: 13.1 MEQ/L (ref 22–26)
ARTERIAL BLOOD GAS HCO3: 14.7 MEQ/L (ref 22–26)
ARTERIAL BLOOD GAS HCO3: 7.9 MEQ/L (ref 22–26)
ARTERIAL BLOOD GAS PCO2: 32 MM HG (ref 35–45)
ARTERIAL BLOOD GAS PCO2: 36 MM HG (ref 35–45)
ARTERIAL BLOOD GAS PCO2: 38 MM HG (ref 35–45)
ARTERIAL BLOOD GAS PCO2: 40 MM HG (ref 35–45)
ARTERIAL BLOOD GAS PH: 7.02 (ref 7.35–7.45)
ARTERIAL BLOOD GAS PH: 7.1 (ref 7.35–7.45)
ARTERIAL BLOOD GAS PH: 7.16 (ref 7.35–7.45)
ARTERIAL BLOOD GAS PH: 7.18 (ref 7.35–7.45)
ARTERIAL BLOOD GAS PO2: 82 MM HG (ref 80–105)
ARTERIAL BLOOD GAS PO2: 97 MM HG (ref 80–105)
ARTERIAL BLOOD GAS PO2: 97 MM HG (ref 80–105)
ARTERIAL BLOOD GAS PO2: 99 MM HG (ref 80–105)
ATRIAL RATE: 76 BPM
BASOPHILS # BLD AUTO: 0.05 X10(3) UL (ref 0–0.2)
BASOPHILS NFR BLD AUTO: 0.4 %
BILIRUB SERPL-MCNC: 0.4 MG/DL (ref 0.1–2)
BILIRUB SERPL-MCNC: 0.5 MG/DL (ref 0.1–2)
BUN BLD-MCNC: 105 MG/DL (ref 7–18)
BUN BLD-MCNC: 96 MG/DL (ref 7–18)
BUN BLD-MCNC: 99 MG/DL (ref 7–18)
BUN/CREAT SERPL: 10 (ref 10–20)
BUN/CREAT SERPL: 10.5 (ref 10–20)
BUN/CREAT SERPL: 9.5 (ref 10–20)
CALCIUM BLD-MCNC: 9.1 MG/DL (ref 8.5–10.1)
CALCIUM BLD-MCNC: 9.3 MG/DL (ref 8.5–10.1)
CALCIUM BLD-MCNC: 9.7 MG/DL (ref 8.5–10.1)
CALCULATED O2 SATURATION: 89 % (ref 92–100)
CALCULATED O2 SATURATION: 90 % (ref 92–100)
CALCULATED O2 SATURATION: 94 % (ref 92–100)
CALCULATED O2 SATURATION: 95 % (ref 92–100)
CARBOXYHEMOGLOBIN: 1 % SAT (ref 0–3)
CARBOXYHEMOGLOBIN: 1.1 % SAT (ref 0–3)
CARBOXYHEMOGLOBIN: 1.1 % SAT (ref 0–3)
CARBOXYHEMOGLOBIN: 1.2 % SAT (ref 0–3)
CHLORIDE SERPL-SCNC: 101 MMOL/L (ref 98–112)
CHLORIDE SERPL-SCNC: 108 MMOL/L (ref 98–112)
CHLORIDE SERPL-SCNC: 99 MMOL/L (ref 98–112)
CO2 SERPL-SCNC: 10 MMOL/L (ref 21–32)
CO2 SERPL-SCNC: 11 MMOL/L (ref 21–32)
CO2 SERPL-SCNC: 7 MMOL/L (ref 21–32)
CREAT BLD-MCNC: 10.4 MG/DL (ref 0.55–1.02)
CREAT BLD-MCNC: 10.5 MG/DL (ref 0.55–1.02)
CREAT BLD-MCNC: 9.11 MG/DL (ref 0.55–1.02)
DEPRECATED RDW RBC AUTO: 47.1 FL (ref 35.1–46.3)
EOSINOPHIL # BLD AUTO: 0 X10(3) UL (ref 0–0.7)
EOSINOPHIL NFR BLD AUTO: 0 %
ERYTHROCYTE [DISTWIDTH] IN BLOOD BY AUTOMATED COUNT: 13.5 % (ref 11–15)
GLOBULIN PLAS-MCNC: 4.3 G/DL (ref 2.8–4.4)
GLOBULIN PLAS-MCNC: 4.3 G/DL (ref 2.8–4.4)
GLUCOSE BLD-MCNC: 119 MG/DL (ref 70–99)
GLUCOSE BLD-MCNC: 130 MG/DL (ref 70–99)
GLUCOSE BLD-MCNC: 137 MG/DL (ref 70–99)
GLUCOSE BLD-MCNC: 141 MG/DL (ref 70–99)
GLUCOSE BLD-MCNC: 145 MG/DL (ref 70–99)
GLUCOSE BLD-MCNC: 145 MG/DL (ref 70–99)
GLUCOSE BLD-MCNC: 146 MG/DL (ref 70–99)
GLUCOSE BLD-MCNC: 85 MG/DL (ref 70–99)
HCT VFR BLD AUTO: 52.4 % (ref 35–48)
HGB BLD-MCNC: 16.1 G/DL (ref 12–16)
IMM GRANULOCYTES # BLD AUTO: 0.07 X10(3) UL (ref 0–1)
IMM GRANULOCYTES NFR BLD: 0.6 %
IONIZED CALCIUM: 1.2 MMOL/L (ref 1.12–1.32)
IONIZED CALCIUM: 1.26 MMOL/L (ref 1.12–1.32)
IONIZED CALCIUM: 1.28 MMOL/L (ref 1.12–1.32)
IONIZED CALCIUM: 1.38 MMOL/L (ref 1.12–1.32)
L/M: 2 L/MIN
L/M: 2 L/MIN
LACTIC ACID ARTERIAL: 12 MMOL/L (ref 0.5–2)
LACTIC ACID ARTERIAL: 7.1 MMOL/L (ref 0.5–2)
LACTIC ACID ARTERIAL: 8.8 MMOL/L (ref 0.5–2)
LACTIC ACID ARTERIAL: 9 MMOL/L (ref 0.5–2)
LYMPHOCYTES # BLD AUTO: 0.6 X10(3) UL (ref 1–4)
LYMPHOCYTES NFR BLD AUTO: 5.1 %
M PROTEIN MFR SERPL ELPH: 7.1 G/DL (ref 6.4–8.2)
M PROTEIN MFR SERPL ELPH: 7.6 G/DL (ref 6.4–8.2)
MCH RBC QN AUTO: 28.9 PG (ref 26–34)
MCHC RBC AUTO-ENTMCNC: 30.7 G/DL (ref 31–37)
MCV RBC AUTO: 94.1 FL (ref 80–100)
METHEMOGLOBIN: 0.7 % SAT (ref 0.4–1.5)
METHEMOGLOBIN: 0.7 % SAT (ref 0.4–1.5)
METHEMOGLOBIN: 0.8 % SAT (ref 0.4–1.5)
METHEMOGLOBIN: 0.9 % SAT (ref 0.4–1.5)
MONOCYTES # BLD AUTO: 0.43 X10(3) UL (ref 0.1–1)
MONOCYTES NFR BLD AUTO: 3.7 %
NEUTROPHILS # BLD AUTO: 10.59 X10 (3) UL (ref 1.5–7.7)
NEUTROPHILS # BLD AUTO: 10.59 X10(3) UL (ref 1.5–7.7)
NEUTROPHILS NFR BLD AUTO: 90.2 %
OSMOLALITY SERPL CALC.SUM OF ELEC: 305 MOSM/KG (ref 275–295)
OSMOLALITY SERPL CALC.SUM OF ELEC: 308 MOSM/KG (ref 275–295)
OSMOLALITY SERPL CALC.SUM OF ELEC: 316 MOSM/KG (ref 275–295)
P AXIS: 44 DEGREES
P-R INTERVAL: 174 MS
P/F RATIO: 388.6 MMHG
PATIENT TEMPERATURE: 98.5 F
PATIENT TEMPERATURE: 98.6 F
PATIENT TEMPERATURE: 98.6 F
PATIENT TEMPERATURE: 99.2 F
PLATELET # BLD AUTO: 257 10(3)UL (ref 150–450)
POTASSIUM BLOOD GAS: 6 MMOL/L (ref 3.6–5.1)
POTASSIUM BLOOD GAS: 6.1 MMOL/L (ref 3.6–5.1)
POTASSIUM BLOOD GAS: 6.4 MMOL/L (ref 3.6–5.1)
POTASSIUM BLOOD GAS: 7.6 MMOL/L (ref 3.6–5.1)
POTASSIUM SERPL-SCNC: 5.6 MMOL/L (ref 3.5–5.1)
POTASSIUM SERPL-SCNC: 6 MMOL/L (ref 3.5–5.1)
POTASSIUM SERPL-SCNC: 6.6 MMOL/L (ref 3.5–5.1)
POTASSIUM SERPL-SCNC: 7.7 MMOL/L (ref 3.5–5.1)
Q-T INTERVAL: 376 MS
QRS DURATION: 120 MS
QTC CALCULATION (BEZET): 423 MS
R AXIS: -72 DEGREES
RBC # BLD AUTO: 5.57 X10(6)UL (ref 3.8–5.3)
SODIUM BLOOD GAS: 132 MMOL/L (ref 136–144)
SODIUM BLOOD GAS: 133 MMOL/L (ref 136–144)
SODIUM BLOOD GAS: 134 MMOL/L (ref 136–144)
SODIUM BLOOD GAS: 135 MMOL/L (ref 136–144)
SODIUM SERPL-SCNC: 131 MMOL/L (ref 136–145)
SODIUM SERPL-SCNC: 133 MMOL/L (ref 136–145)
SODIUM SERPL-SCNC: 137 MMOL/L (ref 136–145)
T AXIS: 65 DEGREES
TOTAL HEMOGLOBIN: 13.9 G/DL (ref 11.7–16)
TOTAL HEMOGLOBIN: 14 G/DL (ref 11.7–16)
TOTAL HEMOGLOBIN: 15 G/DL (ref 11.7–16)
TOTAL HEMOGLOBIN: 15.6 G/DL (ref 11.7–16)
VENTRICULAR RATE: 76 BPM
WBC # BLD AUTO: 11.7 X10(3) UL (ref 4–11)

## 2019-12-26 PROCEDURE — 74176 CT ABD & PELVIS W/O CONTRAST: CPT | Performed by: EMERGENCY MEDICINE

## 2019-12-26 PROCEDURE — 99223 1ST HOSP IP/OBS HIGH 75: CPT | Performed by: HOSPITALIST

## 2019-12-26 PROCEDURE — 71045 X-RAY EXAM CHEST 1 VIEW: CPT | Performed by: EMERGENCY MEDICINE

## 2019-12-26 PROCEDURE — 99291 CRITICAL CARE FIRST HOUR: CPT | Performed by: INTERNAL MEDICINE

## 2019-12-26 RX ORDER — IPRATROPIUM BROMIDE AND ALBUTEROL SULFATE 2.5; .5 MG/3ML; MG/3ML
3 SOLUTION RESPIRATORY (INHALATION) EVERY 2 HOUR PRN
Status: DISCONTINUED | OUTPATIENT
Start: 2019-12-26 | End: 2020-01-01

## 2019-12-26 RX ORDER — HEPARIN SODIUM 5000 [USP'U]/ML
7500 INJECTION, SOLUTION INTRAVENOUS; SUBCUTANEOUS EVERY 8 HOURS SCHEDULED
Status: DISCONTINUED | OUTPATIENT
Start: 2019-12-26 | End: 2020-01-01

## 2019-12-26 RX ORDER — ONDANSETRON 2 MG/ML
4 INJECTION INTRAMUSCULAR; INTRAVENOUS EVERY 6 HOURS PRN
Status: DISCONTINUED | OUTPATIENT
Start: 2019-12-26 | End: 2020-01-01

## 2019-12-26 RX ORDER — IPRATROPIUM BROMIDE AND ALBUTEROL SULFATE 2.5; .5 MG/3ML; MG/3ML
SOLUTION RESPIRATORY (INHALATION)
Status: DISPENSED
Start: 2019-12-26 | End: 2019-12-27

## 2019-12-26 RX ORDER — DEXTROSE MONOHYDRATE 25 G/50ML
50 INJECTION, SOLUTION INTRAVENOUS ONCE
Status: COMPLETED | OUTPATIENT
Start: 2019-12-26 | End: 2019-12-26

## 2019-12-26 RX ORDER — ONDANSETRON 2 MG/ML
4 INJECTION INTRAMUSCULAR; INTRAVENOUS ONCE
Status: COMPLETED | OUTPATIENT
Start: 2019-12-26 | End: 2019-12-26

## 2019-12-26 RX ORDER — ALBUTEROL SULFATE 2.5 MG/3ML
20 SOLUTION RESPIRATORY (INHALATION) CONTINUOUS
Status: DISCONTINUED | OUTPATIENT
Start: 2019-12-26 | End: 2019-12-27

## 2019-12-26 RX ORDER — DEXTROSE MONOHYDRATE 25 G/50ML
50 INJECTION, SOLUTION INTRAVENOUS
Status: DISCONTINUED | OUTPATIENT
Start: 2019-12-26 | End: 2020-01-01

## 2019-12-26 RX ORDER — ATORVASTATIN CALCIUM 10 MG/1
10 TABLET, FILM COATED ORAL NIGHTLY
Status: DISCONTINUED | OUTPATIENT
Start: 2019-12-26 | End: 2020-01-01

## 2019-12-26 RX ORDER — SODIUM CHLORIDE 9 MG/ML
INJECTION, SOLUTION INTRAVENOUS CONTINUOUS
Status: DISCONTINUED | OUTPATIENT
Start: 2019-12-26 | End: 2019-12-26

## 2019-12-26 RX ORDER — CALCIUM CHLORIDE 100 MG/ML
1 INJECTION INTRAVENOUS; INTRAVENTRICULAR ONCE
Status: COMPLETED | OUTPATIENT
Start: 2019-12-26 | End: 2019-12-26

## 2019-12-26 RX ORDER — METOCLOPRAMIDE HYDROCHLORIDE 5 MG/ML
5 INJECTION INTRAMUSCULAR; INTRAVENOUS EVERY 8 HOURS PRN
Status: DISCONTINUED | OUTPATIENT
Start: 2019-12-26 | End: 2020-01-01

## 2019-12-26 NOTE — ED NOTES
Placed pt on commode per MD, pt unable to urine, but felt urge to urinate before CT. No urine provided.

## 2019-12-26 NOTE — ED INITIAL ASSESSMENT (HPI)
A/O x 4. Patient presents with nausea, vomiting, and diarrhea that began yesterday and has not improved.   Patient denies any abdominal pain

## 2019-12-26 NOTE — ED NOTES
Patient states that nausea has improved.   Aware of need for urine specimen, but states that she cannot provide one at this time

## 2019-12-26 NOTE — ED PROVIDER NOTES
Patient Seen in: [facility] Emergency Department      History   Patient presents with:  Nausea/Vomiting/Diarrhea    Stated Complaint:     HPI    Patient is a 70-year-old woman who presents with her son.   Vomiting and diarrhea for the last 2 to 3 day 12/26/19 0548 None (Room air)       Current:/41   Pulse 108   Temp 98.6 °F (37 °C) (Temporal)   Resp 20   Ht 165.1 cm (5' 5\")   Wt 120.7 kg   SpO2 91%   BMI 44.26 kg/m²         Physical Exam    General: Patient is a 5year-old female, resting comfor Non- 4 (*)     GFR, -American 5 (*)     All other components within normal limits   ABG PANEL W ELECT AND LACTATE - Abnormal; Notable for the following components:    ABG pH 7.02 (*)     ABG pCO2 32 (*)     ABG HCO3 7.9 (*)     ABG B Abnormality         Status                     ---------                               -----------         ------                     STOOL CULTURE(P)[[id number]]                                                            SHIGATOXIN[301 specified.       Medications Prescribed:  Current Discharge Medication List            A total of 45 minutes of critical care time (exclusive of billable procedures) was administered to manage the patient's metabolic instability due to acute renal failure a

## 2019-12-26 NOTE — CONSULTS
Critical Care H&P/Consult     NAME: Porsche Ponce - ROOM: [address] [place] [address] MRN: [id number] - Age: 71year old - :  1950    Date of Admission: 2019  5:48 AM  Admission Diagnosis: Dehydration [E86.0]  Hyperkalemia [E87.5]  Gastroenteritis [K52.9]  Ac CHOLECYSTECTOMY     • COLONOSCOPY  2005, 5/17   • COLONOSCOPY  12/2018   • COLONOSCOPY N/A 12/27/2018    Performed by Christopher Wolf MD at [facility] [place] [facility] ENDOSCOPY   • COLONOSCOPY N/A 5/1/2017    Performed by Christopher Wolf MD at [address] [place] [address],[facility] Floor alert in bed   HEENT: Normocephalic, without obvious abnormality, atraumatic. Moist oral mucosa   Lungs: CTAB   Chest wall: No tenderness or deformity. Heart: Regular rate and rhythm, normal S1S2, no murmur.    Abdomen: soft, non-tender, non-distended, po

## 2019-12-26 NOTE — CONSULTS
[facility]  Report of Consultation    [place]ual [place] Patient Status:  Inpatient    1950 MRN [id number]   [facility] 4SW-A Attending Vidhi Hernandez MD   Hosp Day # 0 PCP Jostin Vega MD     Reason for Consultation:  JENNIFER/hyperkale 20 mg, 20 mg, Nebulization, Continuous  No current outpatient medications on file.       Review of Systems:  Denies fever/chills  Denies wt loss/gain  Denies HA or visual changes  Denies CP or palpitations  Denies SOB/cough/hemoptysis  Denies abd or flank p  12/26/2019    K 5.6 12/26/2019     12/26/2019    CO2 7.0 12/26/2019     12/26/2019    CA 9.1 12/26/2019    ALB 3.3 12/26/2019    ALKPHO 51 12/26/2019    BILT 0.5 12/26/2019    TP 7.6 12/26/2019    AST  12/26/2019      Comment:      S Please do not hesitate to call with any questions or concerns.        Chiquita Brooks  12/26/2019  11:02 AM

## 2019-12-26 NOTE — PLAN OF CARE
Reglan dose adjusted to 5 mg for JENNIFER, and heparin SubQ dose adjusted to 7500 units for BMI>40 - per protocol.     Daria Shah, PharmD  12/26/19 12:41 PM

## 2019-12-26 NOTE — H&P
[facility] HOSPITALIST  History and Physical     [place] Patient Status:  Inpatient    1950 MRN [id number]   [facility] 4SW-A Attending Vidhi Hernandez MD   Hosp Day # 0 PCP Jostin Vega MD     Chief Complaint: Nausea, vomiting, History   Problem Relation Age of Onset   • Diabetes Mother    • Cancer Sister         breast        Allergies: No Known Allergies    Medications:  No current facility-administered medications on file prior to encounter.    Potassium Chloride ER 20 MEQ Oral membranes. EOM-I. PERRLA. Anicteric. Neck: No lymphadenopathy. No JVD. No carotid bruits. Respiratory: Clear to auscultation bilaterally. No wheezes. No rhonchi. Cardiovascular: S1, S2. Regular rate and rhythm. No murmurs, rubs or gallops. Equal pulses. MD  73/94/4658          **Certification      PHYSICIAN Certification of Need for Inpatient Hospitalization - Initial Certification    Patient will require inpatient services that will reasonably be expected to span two midnight's based on the clinical docu

## 2019-12-27 ENCOUNTER — APPOINTMENT (OUTPATIENT)
Dept: GENERAL RADIOLOGY | Facility: HOSPITAL | Age: 69
DRG: 682 | End: 2019-12-27
Attending: HOSPITALIST
Payer: MEDICARE

## 2019-12-27 ENCOUNTER — APPOINTMENT (OUTPATIENT)
Dept: INTERVENTIONAL RADIOLOGY/VASCULAR | Facility: HOSPITAL | Age: 69
DRG: 682 | End: 2019-12-27
Attending: INTERNAL MEDICINE
Payer: MEDICARE

## 2019-12-27 LAB
ALBUMIN SERPL-MCNC: 2.9 G/DL (ref 3.4–5)
ALBUMIN/GLOB SERPL: 0.9 {RATIO} (ref 1–2)
ALP LIVER SERPL-CCNC: 35 U/L (ref 55–142)
ALT SERPL-CCNC: 11 U/L (ref 13–56)
ANION GAP SERPL CALC-SCNC: 20 MMOL/L (ref 0–18)
AST SERPL-CCNC: 16 U/L (ref 15–37)
BILIRUB SERPL-MCNC: 0.3 MG/DL (ref 0.1–2)
BILIRUB UR QL STRIP.AUTO: NEGATIVE
BUN BLD-MCNC: 103 MG/DL (ref 7–18)
BUN/CREAT SERPL: 9.4 (ref 10–20)
C3 SERPL-MCNC: 98.4 MG/DL (ref 90–180)
C4 SERPL-MCNC: 26.9 MG/DL (ref 10–40)
CALCIUM BLD-MCNC: 9.5 MG/DL (ref 8.5–10.1)
CHLORIDE SERPL-SCNC: 99 MMOL/L (ref 98–112)
CO2 SERPL-SCNC: 20 MMOL/L (ref 21–32)
CREAT BLD-MCNC: 11 MG/DL (ref 0.55–1.02)
CREAT UR-SCNC: 65.2 MG/DL
GLOBULIN PLAS-MCNC: 3.1 G/DL (ref 2.8–4.4)
GLUCOSE BLD-MCNC: 103 MG/DL (ref 70–99)
GLUCOSE BLD-MCNC: 125 MG/DL (ref 70–99)
GLUCOSE BLD-MCNC: 136 MG/DL (ref 70–99)
GLUCOSE BLD-MCNC: 138 MG/DL (ref 70–99)
GLUCOSE BLD-MCNC: 151 MG/DL (ref 70–99)
GLUCOSE BLD-MCNC: 81 MG/DL (ref 70–99)
GLUCOSE UR STRIP.AUTO-MCNC: 100 MG/DL
HAV IGM SER QL: 2.1 MG/DL (ref 1.6–2.6)
HBV SURFACE AG SER-ACNC: <0.1 [IU]/L
HBV SURFACE AG SERPL QL IA: NONREACTIVE
HCV AB SERPL QL IA: NONREACTIVE
M PROTEIN MFR SERPL ELPH: 6 G/DL (ref 6.4–8.2)
MICROALBUMIN UR-MCNC: 119 MG/DL
MICROALBUMIN/CREAT 24H UR-RTO: 1825.2 UG/MG (ref ?–30)
NITRITE UR QL STRIP.AUTO: NEGATIVE
OSMOLALITY SERPL CALC.SUM OF ELEC: 322 MOSM/KG (ref 275–295)
PH UR STRIP.AUTO: 6.5 [PH] (ref 4.5–8)
PHOSPHATE SERPL-MCNC: 8.4 MG/DL (ref 2.5–4.9)
POTASSIUM SERPL-SCNC: 5.9 MMOL/L (ref 3.5–5.1)
POTASSIUM SERPL-SCNC: 6.2 MMOL/L (ref 3.5–5.1)
PROT UR STRIP.AUTO-MCNC: >=300 MG/DL
RBC #/AREA URNS AUTO: >10 /HPF
RHEUMATOID FACT SERPL-ACNC: <10 IU/ML (ref ?–15)
SED RATE-ML: 25 MM/HR (ref 0–25)
SODIUM SERPL-SCNC: 139 MMOL/L (ref 136–145)
SP GR UR STRIP.AUTO: >=1.03 (ref 1–1.03)
UROBILINOGEN UR STRIP.AUTO-MCNC: 0.2 MG/DL

## 2019-12-27 PROCEDURE — 5A09357 ASSISTANCE WITH RESPIRATORY VENTILATION, LESS THAN 24 CONSECUTIVE HOURS, CONTINUOUS POSITIVE AIRWAY PRESSURE: ICD-10-PCS | Performed by: INTERNAL MEDICINE

## 2019-12-27 PROCEDURE — 02HV33Z INSERTION OF INFUSION DEVICE INTO SUPERIOR VENA CAVA, PERCUTANEOUS APPROACH: ICD-10-PCS | Performed by: RADIOLOGY

## 2019-12-27 PROCEDURE — 5A1D70Z PERFORMANCE OF URINARY FILTRATION, INTERMITTENT, LESS THAN 6 HOURS PER DAY: ICD-10-PCS | Performed by: INTERNAL MEDICINE

## 2019-12-27 PROCEDURE — 99232 SBSQ HOSP IP/OBS MODERATE 35: CPT | Performed by: HOSPITALIST

## 2019-12-27 PROCEDURE — 99233 SBSQ HOSP IP/OBS HIGH 50: CPT | Performed by: INTERNAL MEDICINE

## 2019-12-27 PROCEDURE — 71045 X-RAY EXAM CHEST 1 VIEW: CPT | Performed by: HOSPITALIST

## 2019-12-27 PROCEDURE — B548ZZA ULTRASONOGRAPHY OF SUPERIOR VENA CAVA, GUIDANCE: ICD-10-PCS | Performed by: RADIOLOGY

## 2019-12-27 RX ORDER — HEPARIN SODIUM 1000 [USP'U]/ML
1.5 INJECTION, SOLUTION INTRAVENOUS; SUBCUTANEOUS ONCE
Status: COMPLETED | OUTPATIENT
Start: 2019-12-27 | End: 2019-12-27

## 2019-12-27 RX ORDER — ALBUMIN (HUMAN) 12.5 G/50ML
100 SOLUTION INTRAVENOUS AS NEEDED
Status: DISCONTINUED | OUTPATIENT
Start: 2019-12-27 | End: 2019-12-28

## 2019-12-27 RX ORDER — LIDOCAINE HYDROCHLORIDE 10 MG/ML
INJECTION, SOLUTION INFILTRATION; PERINEURAL
Status: COMPLETED
Start: 2019-12-27 | End: 2019-12-27

## 2019-12-27 RX ORDER — HEPARIN SODIUM 5000 [USP'U]/ML
INJECTION, SOLUTION INTRAVENOUS; SUBCUTANEOUS
Status: COMPLETED
Start: 2019-12-27 | End: 2019-12-27

## 2019-12-27 RX ORDER — DEXTROSE MONOHYDRATE 25 G/50ML
50 INJECTION, SOLUTION INTRAVENOUS AS NEEDED
Status: DISCONTINUED | OUTPATIENT
Start: 2019-12-27 | End: 2019-12-27

## 2019-12-27 RX ORDER — DEXTROSE MONOHYDRATE 25 G/50ML
50 INJECTION, SOLUTION INTRAVENOUS ONCE
Status: COMPLETED | OUTPATIENT
Start: 2019-12-27 | End: 2019-12-27

## 2019-12-27 RX ORDER — INSULIN ASPART 100 [IU]/ML
10 INJECTION, SOLUTION INTRAVENOUS; SUBCUTANEOUS ONCE
Status: DISCONTINUED | OUTPATIENT
Start: 2019-12-27 | End: 2019-12-27

## 2019-12-27 RX ORDER — FUROSEMIDE 10 MG/ML
40 INJECTION INTRAMUSCULAR; INTRAVENOUS ONCE
Status: COMPLETED | OUTPATIENT
Start: 2019-12-27 | End: 2019-12-27

## 2019-12-27 NOTE — PROGRESS NOTES
12/27/19 0856   Clinical Encounter Type   Visited With Patient   Referral To Nurse  (Patient signed POLST form.    scanned POLST form, dated 12/27/19, into patient's electronic resuscitation wishes/POLST record. )

## 2019-12-27 NOTE — PROGRESS NOTES
[facility] HOSPITALIST  Progress Note     [place] Patient Status:  Inpatient    1950 MRN [id number]   [facility] 4SW-A Attending Vidhi Hernandez MD   Hosp Day # 1 PCP Jsotin Vega MD     Chief Complaint:  N&V, diarrhea  S:  To ha --  7.1  --   --   --  6.0*    < > = values in this interval not displayed. Imaging: Imaging data reviewed in Epic. ASSESSMENT / PLAN:   1. JENNIFER on CKD, severe  1. Likely pre-renal  2. Bicarb gtt per renal  3. Small amt UO in pleitez   4.  HD today tem

## 2019-12-27 NOTE — PLAN OF CARE
Received patient from ER. Patient is alert and oriented on room air. Oxygen saturations decreased throughout the day- patient was placed on 2L NC. Patient in sinus rhythm/sinus tachycardia with elevated T waves- Nadia LE aware.  A pleitez catheter was p

## 2019-12-27 NOTE — PAYOR COMM NOTE
--------------  ADMISSION REVIEW     Kandice Vazquez MA O  Subscriber #:  [id number]  Authorization Number: [id number]    Admit date: 12/26/19  Admit time: 46       Admitting Physician: Namrata Parker MD  Attending Physician:  Namrata Parker MD  Primary Ca (37 °C) (Temporal)   Resp 20   Ht 165.1 cm (5' 5\")   Wt 120.7 kg   SpO2 91%   BMI 44.26 kg/m²      Physical Exam  General: Patient is a 5year-old female, resting comfortably HEENT: Normal cephalic atraumatic. Nonicteric sclera. Dry mucous membranes.   [employer] limits   ABG PANEL W ELECT AND LACTATE - Abnormal; Notable for the following components:    ABG pH 7.02 (*)     ABG pCO2 32 (*)     ABG HCO3 7.9 (*)     ABG Base Excess -22.1 (*)     Calculated O2 Saturation 90 (*)     Ionized Calcium 1.38 (*)     Pot[place]iu well as critical care and the [facility] Hospitalist.  Given the profound lactic acidosis, will check CT scan of the abdomen pelvis. Will continue aggressive hydration. To this point has not urinated to have the urge to urinate.   Says she had not urinated fo ALB 3.3*  --   --    *  --  137   K  --  7.7* 5.6*   CL 99  --  108   CO2 11.0*  --  7.0*   ALKPHO 51*  --   --    ALT 21  --   --    BILT 0.5  --   --    TP 7.6  --   --      ASSESSMENT / PLAN:     1. JENNIFER on CKD, severe  1. Likely pre-renal  2.  Bi bowel sounds, no palpable organomegaly  Extremities: Without clubbing, cyanosis or edema.   Neurologic:  moving all extremities  Skin: Warm and dry, no rash           Labs:          Recent Labs   Lab 12/26/19  0558   WBC 11.7*   HGB 16.1*   MCV 94.1   PLT 2 Q15 Min PRN  Insulin Aspart Pen (NOVOLOG) 100 UNIT/ML flexpen 2-10 Units, 2-10 Units, Subcutaneous, TID CC and HS  sodium bicarbonate 150 mEq in dextrose 5 % 1,000 mL infusion, 150 mEq, Intravenous, Continuous  ipratropium-albuterol (DUONEB) nebulizer solu placement   2. Nausea, vomiting, diarrhea,   1. CT a/p reviewed  2. Stool studies pending  3. CLD, advance as tolerated  4. No  Flatus,    3. Hyperkalemia, due to above, k 5.9   4. CHF, compensated, on IVF for above  5. DL, statin  6.  DMII, hyperglycemia p Regular Human (NOVOLIN R) 100 UNIT/ML injection 10 Units     Date Action Dose Route User    12/26/2019 1804 Given 10 Units Intravenous Cole Villarreal RN      Insulin Regular Human (NOVOLIN R) 100 UNIT/ML injection 10 Units     Date Action Dose Route User

## 2019-12-27 NOTE — PROGRESS NOTES
Critical Care Progress Note        NAME: Zhane Kennedy - ROOM: [address] [place] [address] - MRN: [id number] - Age: 71year old - : 1950  Date of Admission: 2019  5:48 AM  Admission Diagnosis: Dehydration [E86.0]  Hyperkalemia [E87.5]  Gastroenteritis [K52.9] organomegaly  Extremities: edema 1-2+ in lluvia LE      Recent Labs     12/26/19  0558   WBC 11.7*   HGB 16.1*   MCV 94.1   .0       Recent Labs     12/26/19  0558  12/26/19  0942 12/26/19  1515 12/26/19  1635 12/26/19  2113 12/27/19  0044 12/27/19  04

## 2019-12-27 NOTE — CM/SW NOTE
12/27/19 1500   CM/SW Screening   Referral Source    Information Source Chart review   Patient's Mental Status Alert;Oriented   Patient lives with Spouse   Discharge Needs   Anticipated D/C needs To be determined     [facility] to see for possible

## 2019-12-27 NOTE — PLAN OF CARE
Received pt on 2L; titrated to maintain Spo2. NSR/ST on tele, afebrile. Tolerating CLD, no N/V. D50/insulin given for hyperkalemia per orders from Dr. Yousif Lama. Blood sugars monitored AC/HS. Aleman with minimal output; lasix given, urine sample sent to lab.  U

## 2019-12-27 NOTE — HOME CARE LIAISON
Received referral from [address] [place] [address] for home health care services. Patient unable to discuss at this time- currently receiving HD. Left message to patient's spouse, Jeo Rogel- awaiting call back. Will continue to follow. Spoke to patient's spouse, Joe Rogel.  Abbie Grandchild

## 2019-12-27 NOTE — PROCEDURES
[facility]  Procedure Note    Manual Lands Patient Status:  Inpatient    1950 MRN [id number]   [facility] [address] [facility] Attending Vidhi Hernandez MD   Hosp Day # 1 PCP Jostin Vega MD     Procedure: right IJ temporary H

## 2019-12-27 NOTE — PROGRESS NOTES
[facility]  Nephrology Progress Note    Lylia Backbone Patient Status:  Inpatient    1950 MRN [id number]   [facility] 4SW-A Attending Jade Castillo MD   Hosp Day # 1 PCP Chandrika Alfonso MD       SUBJECTIVE:  Oliguric overnight, ha --  139   K  --    < > 5.6*  --  6.6* 6.0* 6.2* 5.9*   CL 99  --  108 101  --   --   --  99   CO2 11.0*  --  7.0* 10.0*  --   --   --  20.0*   *  --  96* 99*  --   --   --  103*   CREATSERUM 10.50*  --  9.11* 10.40*  --   --   --  11.00*   CA 9.7 oligoanuric JENNIFER in association with vol depletion from profuse diarrhea/poor intake. Clinical course appears now most c/w ATN with no response to aggressive volume resuscitation. U/a noted and will send serologic workup as well.   No bloody diarrhea or tomlinson

## 2019-12-28 LAB
ALBUMIN SERPL-MCNC: 2.9 G/DL (ref 3.4–5)
ALBUMIN/GLOB SERPL: 1 {RATIO} (ref 1–2)
ALP LIVER SERPL-CCNC: 38 U/L (ref 55–142)
ALT SERPL-CCNC: 10 U/L (ref 13–56)
ANION GAP SERPL CALC-SCNC: 4 MMOL/L (ref 0–18)
AST SERPL-CCNC: 14 U/L (ref 15–37)
BILIRUB SERPL-MCNC: 0.3 MG/DL (ref 0.1–2)
BUN BLD-MCNC: 42 MG/DL (ref 7–18)
BUN/CREAT SERPL: 6.5 (ref 10–20)
CALCIUM BLD-MCNC: 7.9 MG/DL (ref 8.5–10.1)
CHLORIDE SERPL-SCNC: 101 MMOL/L (ref 98–112)
CO2 SERPL-SCNC: 30 MMOL/L (ref 21–32)
CREAT BLD-MCNC: 6.51 MG/DL (ref 0.55–1.02)
DEPRECATED RDW RBC AUTO: 48.2 FL (ref 35.1–46.3)
ERYTHROCYTE [DISTWIDTH] IN BLOOD BY AUTOMATED COUNT: 14.1 % (ref 11–15)
GLOBULIN PLAS-MCNC: 2.8 G/DL (ref 2.8–4.4)
GLUCOSE BLD-MCNC: 101 MG/DL (ref 70–99)
GLUCOSE BLD-MCNC: 114 MG/DL (ref 70–99)
GLUCOSE BLD-MCNC: 139 MG/DL (ref 70–99)
GLUCOSE BLD-MCNC: 94 MG/DL (ref 70–99)
GLUCOSE BLD-MCNC: 99 MG/DL (ref 70–99)
HCT VFR BLD AUTO: 38.3 % (ref 35–48)
HGB BLD-MCNC: 12.1 G/DL (ref 12–16)
M PROTEIN MFR SERPL ELPH: 5.7 G/DL (ref 6.4–8.2)
MCH RBC QN AUTO: 29.4 PG (ref 26–34)
MCHC RBC AUTO-ENTMCNC: 31.6 G/DL (ref 31–37)
MCV RBC AUTO: 93.2 FL (ref 80–100)
OSMOLALITY SERPL CALC.SUM OF ELEC: 291 MOSM/KG (ref 275–295)
PLATELET # BLD AUTO: 140 10(3)UL (ref 150–450)
POTASSIUM SERPL-SCNC: 4.9 MMOL/L (ref 3.5–5.1)
RBC # BLD AUTO: 4.11 X10(6)UL (ref 3.8–5.3)
SODIUM SERPL-SCNC: 135 MMOL/L (ref 136–145)
WBC # BLD AUTO: 6.5 X10(3) UL (ref 4–11)

## 2019-12-28 PROCEDURE — 99233 SBSQ HOSP IP/OBS HIGH 50: CPT | Performed by: INTERNAL MEDICINE

## 2019-12-28 PROCEDURE — 99232 SBSQ HOSP IP/OBS MODERATE 35: CPT | Performed by: HOSPITALIST

## 2019-12-28 RX ORDER — HEPARIN SODIUM 1000 [USP'U]/ML
1500 INJECTION, SOLUTION INTRAVENOUS; SUBCUTANEOUS
Status: DISCONTINUED | OUTPATIENT
Start: 2019-12-28 | End: 2020-01-01

## 2019-12-28 RX ORDER — ALBUMIN (HUMAN) 12.5 G/50ML
100 SOLUTION INTRAVENOUS AS NEEDED
Status: DISCONTINUED | OUTPATIENT
Start: 2019-12-28 | End: 2020-01-01

## 2019-12-28 RX ORDER — HEPARIN SODIUM 1000 [USP'U]/ML
1.5 INJECTION, SOLUTION INTRAVENOUS; SUBCUTANEOUS
Status: ACTIVE | OUTPATIENT
Start: 2019-12-28 | End: 2019-12-28

## 2019-12-28 RX ORDER — HEPARIN SODIUM 1000 [USP'U]/ML
1.5 INJECTION, SOLUTION INTRAVENOUS; SUBCUTANEOUS ONCE
Status: DISCONTINUED | OUTPATIENT
Start: 2019-12-28 | End: 2019-12-28

## 2019-12-28 NOTE — PROGRESS NOTES
Critical Care Progress Note     Assessment / Plan:  1. Acute respiratory failure - due to volume overload and atelectasis  - HD today  - OOB and IS as able  - wean O2 as able  2.  Acute on CKD c/b metabolic acidemia and hyperkalemia - prerenal  - HD per kiera

## 2019-12-28 NOTE — PLAN OF CARE
Pt received in bed able to communicate her needs. Temp HD port inserted at bedside. Pt had 1 L removed. Aleman with minimal output (250cc).  at bedside. VSS. Will continue to monitor in the ICU.

## 2019-12-28 NOTE — PROGRESS NOTES
[facility]  Nephrology Progress Note    Lisa Jewell Patient Status:  Inpatient    1950 MRN [id number]   [facility] 4SW-A Attending Anuel Gerber MD   Hosp Day # 2 PCP Divya Calzada MD       SUBJECTIVE:  Looks better today, c/ --   --  139 135*   K  --    < > 5.6*  --  6.6* 6.0* 6.2* 5.9* 4.9   CL 99  --  108 101  --   --   --  99 101   CO2 11.0*  --  7.0* 10.0*  --   --   --  20.0* 30.0   *  --  96* 99*  --   --   --  103* 42*   CREATSERUM 10.50*  --  9.11* 10.40*  -- most c/w ATN with no response to aggressive volume resuscitation. U/a noted with proteinuria and microhematuria (although with pleitez) and serologic workup sent as well. No bloody diarrhea or suggestion of HUS.  HD initiated yesterday for uremia/persistent

## 2019-12-28 NOTE — PROGRESS NOTES
[facility]  Progress Note     Dena Cakavitha Patient Status:  Inpatient    1950 MRN [id number]   [facility] 4SW-A Attending Kashif Hollis MD   Hosp Day # 2 PCP Patricia Carrington MD     Chief Complaint:  N&V, diarrhea    S:  No pending  3. Hyperkalemia, due to above, improved, k 4.9  4. CHF, compensated, as above  5. DL, statin  6. DMII, hyperglycemia protocol  7. PAT  8. Morbid obesity  BMI 44   9.  Metabolic acidosis, resolved with HD    Quality:  · DVT Prophylaxis: heparin  · C

## 2019-12-28 NOTE — PLAN OF CARE
Received pt on NC, finishing up HD. 1L off. Afebrile, NSR with PACs on tele. Tolerating renal diet. Blood sugars monitored AC/HS. Aleman patent with minimal UO. Up with assistance to bathroom; 1 BM overnight.  RT notified to set up CPAP overnight; pt wore fo

## 2019-12-28 NOTE — PLAN OF CARE
Received patient following night RN. Patient is alert and oriented on 8L NC. Denies any pain. Patient to receive dialysis today per Dr. Michael Perla- possible transfer out of ICU depending on how dialysis is tolerated. Will continue to monitor.      Spoke to t

## 2019-12-29 LAB
ANION GAP SERPL CALC-SCNC: 5 MMOL/L (ref 0–18)
BUN BLD-MCNC: 21 MG/DL (ref 7–18)
BUN/CREAT SERPL: 4.9 (ref 10–20)
CALCIUM BLD-MCNC: 8.1 MG/DL (ref 8.5–10.1)
CHLORIDE SERPL-SCNC: 99 MMOL/L (ref 98–112)
CO2 SERPL-SCNC: 33 MMOL/L (ref 21–32)
CREAT BLD-MCNC: 4.32 MG/DL (ref 0.55–1.02)
DEPRECATED RDW RBC AUTO: 46.3 FL (ref 35.1–46.3)
ERYTHROCYTE [DISTWIDTH] IN BLOOD BY AUTOMATED COUNT: 13.9 % (ref 11–15)
GLUCOSE BLD-MCNC: 105 MG/DL (ref 70–99)
GLUCOSE BLD-MCNC: 119 MG/DL (ref 70–99)
GLUCOSE BLD-MCNC: 136 MG/DL (ref 70–99)
GLUCOSE BLD-MCNC: 140 MG/DL (ref 70–99)
GLUCOSE BLD-MCNC: 153 MG/DL (ref 70–99)
HCT VFR BLD AUTO: 36.2 % (ref 35–48)
HGB BLD-MCNC: 11.5 G/DL (ref 12–16)
MCH RBC QN AUTO: 28.8 PG (ref 26–34)
MCHC RBC AUTO-ENTMCNC: 31.8 G/DL (ref 31–37)
MCV RBC AUTO: 90.5 FL (ref 80–100)
MYELOPEROX ANTIBODIES, IGG: 0 AU/ML
OSMOLALITY SERPL CALC.SUM OF ELEC: 289 MOSM/KG (ref 275–295)
PLATELET # BLD AUTO: 107 10(3)UL (ref 150–450)
POTASSIUM SERPL-SCNC: 3.8 MMOL/L (ref 3.5–5.1)
RBC # BLD AUTO: 4 X10(6)UL (ref 3.8–5.3)
SERINE PROTEASE3, IGG: 3 AU/ML
SODIUM SERPL-SCNC: 137 MMOL/L (ref 136–145)
WBC # BLD AUTO: 6 X10(3) UL (ref 4–11)

## 2019-12-29 PROCEDURE — 99232 SBSQ HOSP IP/OBS MODERATE 35: CPT | Performed by: HOSPITALIST

## 2019-12-29 PROCEDURE — 99233 SBSQ HOSP IP/OBS HIGH 50: CPT | Performed by: INTERNAL MEDICINE

## 2019-12-29 RX ORDER — BUMETANIDE 0.25 MG/ML
2 INJECTION, SOLUTION INTRAMUSCULAR; INTRAVENOUS ONCE
Status: COMPLETED | OUTPATIENT
Start: 2019-12-29 | End: 2019-12-29

## 2019-12-29 NOTE — PLAN OF CARE
Received patient following night RN. Patient is alert and oriented on 5L NC. Refusing SCDs at this time. Denies any pain. Waiting to transfer out of ICU. Will continue to monitor.      1700: Report was given to KAYE Aden taking the patient going into room 362

## 2019-12-29 NOTE — PROGRESS NOTES
This note also relates to the following rows which could not be included:  Resp - Cannot attach notes to unvalidated device data  Pulse - Cannot attach notes to unvalidated device data  SpO2 - Cannot attach notes to unvalidated device data       12/28/19 2

## 2019-12-29 NOTE — PROGRESS NOTES
[facility]  Nephrology Progress Note    Lylia Backbone Patient Status:  Inpatient    1950 MRN [id number]   [facility] 4SW-A Attending Jade Castillo MD   Hosp Day # 3 PCP Chandrika Alfonso MD       SUBJECTIVE:  Feels much better toda --   --  139 135* 137   K 5.6*  --    < > 6.0* 6.2* 5.9* 4.9 3.8    101  --   --   --  99 101 99   CO2 7.0* 10.0*  --   --   --  20.0* 30.0 33.0*   BUN 96* 99*  --   --   --  103* 42* 21*   CREATSERUM 9.11* 10.40*  --   --   --  11.00* 6.51* 4.32* association with vol depletion from profuse diarrhea/poor intake. Clinical course appears now most c/w ATN with no response to aggressive volume resuscitation.   U/a noted with proteinuria and microhematuria (although with pleitez) and serologic workup sent

## 2019-12-29 NOTE — PROGRESS NOTES
Critical Care Progress Note     Assessment / Plan:  1. Acute respiratory failure - due to volume overload and atelectasis  - volume management per renal  - OOB and IS as able  - wean O2 as able  2.  Acute on CKD c/b metabolic acidemia and hyperkalemia - pre

## 2019-12-29 NOTE — PROGRESS NOTES
[facility]  Progress Note     Leah Maloney Patient Status:  Inpatient    1950 MRN [id number]   [facility] 4SW-A Attending Jyoti Canseco MD   Hosp Day # 3 PCP Juan Carlos Prater MD     Chief Complaint:  N&V, diarrhea    S:  No renal  2. Nausea, vomiting, diarrhea  1. CT a/p reviewed  2. Resolved    3. Hyperkalemia, due to above, resolved  4. CHF, compensated, as above  5. DL, statin  6. DMII, hyperglycemia protocol, BS controlled  7. PAT  8. Morbid obesity  BMI 44   9.  Metabolic

## 2019-12-29 NOTE — PLAN OF CARE
Assumed care of patient for the night. Patient A/O x4, resting in bed on 6L NC. Patient having some apneic episodes during sleep and was able to be convinced to wear her Trilogy mask for the night.  NSR on monitor with some occasional runs of ST. BP stable

## 2019-12-30 LAB
ALBUMIN SERPL-MCNC: 3.1 G/DL (ref 3.4–5)
ALBUMIN/GLOB SERPL: 0.9 {RATIO} (ref 1–2)
ALP LIVER SERPL-CCNC: 49 U/L (ref 55–142)
ALT SERPL-CCNC: 12 U/L (ref 13–56)
ANA SCREEN: NEGATIVE
ANION GAP SERPL CALC-SCNC: 6 MMOL/L (ref 0–18)
AST SERPL-CCNC: 15 U/L (ref 15–37)
BILIRUB SERPL-MCNC: 0.4 MG/DL (ref 0.1–2)
BUN BLD-MCNC: 30 MG/DL (ref 7–18)
BUN/CREAT SERPL: 5.4 (ref 10–20)
CALCIUM BLD-MCNC: 8.7 MG/DL (ref 8.5–10.1)
CHLORIDE SERPL-SCNC: 100 MMOL/L (ref 98–112)
CO2 SERPL-SCNC: 31 MMOL/L (ref 21–32)
CREAT BLD-MCNC: 5.54 MG/DL (ref 0.55–1.02)
GLOBULIN PLAS-MCNC: 3.3 G/DL (ref 2.8–4.4)
GLUCOSE BLD-MCNC: 103 MG/DL (ref 70–99)
GLUCOSE BLD-MCNC: 106 MG/DL (ref 70–99)
GLUCOSE BLD-MCNC: 106 MG/DL (ref 70–99)
GLUCOSE BLD-MCNC: 127 MG/DL (ref 70–99)
GLUCOSE BLD-MCNC: 128 MG/DL (ref 70–99)
GLUCOSE BLD-MCNC: 128 MG/DL (ref 70–99)
M PROTEIN MFR SERPL ELPH: 6.4 G/DL (ref 6.4–8.2)
OSMOLALITY SERPL CALC.SUM OF ELEC: 292 MOSM/KG (ref 275–295)
POTASSIUM SERPL-SCNC: 4 MMOL/L (ref 3.5–5.1)
SODIUM SERPL-SCNC: 137 MMOL/L (ref 136–145)

## 2019-12-30 PROCEDURE — 99233 SBSQ HOSP IP/OBS HIGH 50: CPT | Performed by: INTERNAL MEDICINE

## 2019-12-30 PROCEDURE — 99222 1ST HOSP IP/OBS MODERATE 55: CPT | Performed by: INTERNAL MEDICINE

## 2019-12-30 PROCEDURE — 99232 SBSQ HOSP IP/OBS MODERATE 35: CPT | Performed by: HOSPITALIST

## 2019-12-30 RX ORDER — BUMETANIDE 0.25 MG/ML
2 INJECTION, SOLUTION INTRAMUSCULAR; INTRAVENOUS ONCE
Status: COMPLETED | OUTPATIENT
Start: 2019-12-30 | End: 2019-12-30

## 2019-12-30 NOTE — PLAN OF CARE
A&Ox4. Hard of hearing. On oxygen at 3 L/min, AVAPs at night. Lungs diminished with expiratory wheezes, denies MITRA. Abdomen soft and nontender, BS active. Denies N&V. NSR on tele. Aleman catheter in place- draining clear, yellow urine.  HD patient- diminishe or any respiratory difficulty  - Respiratory Therapy support as indicated  - Manage/alleviate anxiety  - Monitor for signs/symptoms of CO2 retention  Outcome: Progressing     Problem: GASTROINTESTINAL - ADULT  Goal: Minimal or absence of nausea and vomitin patient on self management of diabetes  Outcome: Progressing  Goal: Electrolytes maintained within normal limits  Description  INTERVENTIONS:  - Monitor labs and rhythm and assess patient for signs and symptoms of electrolyte imbalances  - Administer elect

## 2019-12-30 NOTE — PROGRESS NOTES
[facility]  Progress Note     Bryan Almazan Patient Status:  Inpatient    1950 MRN [id number]   [facility] 4SW-A Attending Maninder Salazar MD   Hosp Day # 4 PCP Boom Sena MD     Chief Complaint:  N&V, diarrhea    S:  Fel hyperglycemia protocol, BS controlled  7. PAT  8. Morbid obesity  BMI 44   9. Metabolic acidosis, resolved with HD  10.    PLAN  Wean O2   Cr  5.54  Higher   Has home o2  Uses 2 liters to walk around   Feeling better  Vol status compensated    at bed

## 2019-12-30 NOTE — PROGRESS NOTES
[facility]  Nephrology Progress Note    Shey Gore Patient Status:  Inpatient    1950 MRN [id number]   [facility] 4SW-A Attending Sina Manzanares MD   Hosp Day # 4 PCP Joseluis Bray MD       SUBJECTIVE:  No complains  Urinatin kg)  12/13/19 : 272 lb (123.4 kg)  12/10/19 : 276 lb 14.4 oz (125.6 kg)  12/03/19 : 297 lb 6.4 oz (134.9 kg)  08/26/19 : 293 lb 6.4 oz (133.1 kg)      General: Alert and oriented in no apparent distress.   HEENT: No scleral icterus, MMM  Neck: Supple  Cardi

## 2019-12-30 NOTE — PROGRESS NOTES
Jennifer 63 Patient Status:  Inpatient    1950 MRN [id number]   [facility] 3NE-A Attending Cristino Hampton MD   Hosp Day # 4 PCP Charmayne Devoid, MD     SUBJECTIVE: Pt states breathing is improving, is hoping that sh without erythema or exudates, moist mucous membranes                          Lungs: Clear to auscultation bilaterally, no wheezes or crackles                           Chest wall: No tenderness or deformity.                           HKPCV: YLRFFSO rate an

## 2019-12-30 NOTE — CM/SW NOTE
Expected Discharge Plan:    Projected Destination: : Home with [address] [place] [address]- accepted with [place] [id number]

## 2019-12-30 NOTE — PLAN OF CARE
Assumed patient care @7594. Patient is alert & oriented x4. [place], wears glasses and L hearing aid. 1L O2 via NC.  NSR on tele, subq heparin. Electrolyte protocol. VSS. Notified MDs of HR going to 120s, non-sustaining.   Aleman catheter in place, minimal cough, deep breathe, Incentive Spirometry  - Assess the need for suctioning and perform as needed  - Assess and instruct to report SOB or any respiratory difficulty  - Respiratory Therapy support as indicated  - Manage/alleviate anxiety  - Monitor for sign Monitor response to electrolyte replacements, including rhythm and repeat lab results as appropriate  - Fluid restriction as ordered  - Instruct patient on fluid and nutrition restrictions as appropriate  Outcome: Progressing  Goal: Hemodynamic stability a

## 2019-12-30 NOTE — PLAN OF CARE
Pt resting comfortably in room. O2 down to 4L 100%. HR ns to st on tele. Avaps in room for tonight. No needs at this time.  Transported via bed

## 2019-12-30 NOTE — PAYOR COMM NOTE
--------------    12/30:  STILL IN HOUSE    CONTINUED STAY REVIEW    Bjorn Vidal MA O  Subscriber #:  [id number]  Authorization Number: [id number]    Admit date: 12/26/19  Admit time: 1714

## 2019-12-31 LAB
ANION GAP SERPL CALC-SCNC: 6 MMOL/L (ref 0–18)
ATRIAL RATE: 80 BPM
BUN BLD-MCNC: 40 MG/DL (ref 7–18)
BUN/CREAT SERPL: 7.3 (ref 10–20)
CALCIUM BLD-MCNC: 8.9 MG/DL (ref 8.5–10.1)
CHLORIDE SERPL-SCNC: 102 MMOL/L (ref 98–112)
CO2 SERPL-SCNC: 30 MMOL/L (ref 21–32)
CREAT BLD-MCNC: 5.5 MG/DL (ref 0.55–1.02)
GLUCOSE BLD-MCNC: 104 MG/DL (ref 70–99)
GLUCOSE BLD-MCNC: 106 MG/DL (ref 70–99)
GLUCOSE BLD-MCNC: 117 MG/DL (ref 70–99)
GLUCOSE BLD-MCNC: 121 MG/DL (ref 70–99)
GLUCOSE BLD-MCNC: 125 MG/DL (ref 70–99)
HAV IGM SER QL: 1.9 MG/DL (ref 1.6–2.6)
OSMOLALITY SERPL CALC.SUM OF ELEC: 297 MOSM/KG (ref 275–295)
P AXIS: 49 DEGREES
P-R INTERVAL: 140 MS
PLATELET # BLD AUTO: 121 10(3)UL (ref 150–450)
POTASSIUM SERPL-SCNC: 3.8 MMOL/L (ref 3.5–5.1)
Q-T INTERVAL: 340 MS
QRS DURATION: 106 MS
QTC CALCULATION (BEZET): 392 MS
R AXIS: -60 DEGREES
SODIUM SERPL-SCNC: 138 MMOL/L (ref 136–145)
T AXIS: 63 DEGREES
VENTRICULAR RATE: 80 BPM

## 2019-12-31 PROCEDURE — 99233 SBSQ HOSP IP/OBS HIGH 50: CPT | Performed by: INTERNAL MEDICINE

## 2019-12-31 PROCEDURE — 99232 SBSQ HOSP IP/OBS MODERATE 35: CPT | Performed by: INTERNAL MEDICINE

## 2019-12-31 RX ORDER — METOPROLOL SUCCINATE 25 MG/1
25 TABLET, EXTENDED RELEASE ORAL
Status: DISCONTINUED | OUTPATIENT
Start: 2019-12-31 | End: 2020-01-01

## 2019-12-31 RX ORDER — BUMETANIDE 0.25 MG/ML
1 INJECTION, SOLUTION INTRAMUSCULAR; INTRAVENOUS ONCE
Status: COMPLETED | OUTPATIENT
Start: 2019-12-31 | End: 2019-12-31

## 2019-12-31 NOTE — PROGRESS NOTES
Jennifer 63 Patient Status:  Inpatient    1950 MRN [id number]   [facility] 3NE-A Attending Tony Quintero MD   Hosp Day # 5 PCP Lacy Hernandez MD     SUBJECTIVE:  Pt denies SOB or respiratory complaints.     OBJECT NAD                          HEENT: oropharynx clear without erythema or exudates, moist mucous membranes                          Lungs: Clear to auscultation bilaterally, no wheezes or crackles                           Chest wall: No tenderness or defor

## 2019-12-31 NOTE — PROGRESS NOTES
[facility]  Progress Note    Power Bhatt Patient Status:  Inpatient    1950 MRN [id number]   [facility] 3NE-A Attending Alexis Wilcox MD   Hosp Day # 5 PCP Melissa Brown MD     Impression:  1) non sustained atrial tachycardia CA 8.9 12/31/2019        Lab Results   Component Value Date    TROP <0.045 12/03/2019    TROP <0.046 06/02/2017        Medications:    • metoprolol succinate  25 mg Oral Daily Beta Blocker   • melatonin  5 mg Oral Nightly   • atorvastatin  10 mg Oral Night

## 2019-12-31 NOTE — PLAN OF CARE
Assumed care at [facility]. Pt. A&O x4, on 1 L NC. Placed at 3 L NC later in the shift due to desaturation while sleeping. On tele, NSR and afib. Cardiology saw, started Metoprolol. Fall precautions in place. Will continue to monitor.      Problem: CARDIOVASCULAR Monitor for signs/symptoms of CO2 retention  Outcome: Progressing     Problem: GASTROINTESTINAL - ADULT  Goal: Minimal or absence of nausea and vomiting  Description  INTERVENTIONS:  - Maintain adequate hydration with IV or PO as ordered and tolerated  - N limits  Description  INTERVENTIONS:  - Monitor labs and rhythm and assess patient for signs and symptoms of electrolyte imbalances  - Administer electrolyte replacement as ordered  - Monitor response to electrolyte replacements, including rhythm and repeat

## 2019-12-31 NOTE — CONSULTS
[facility]  Electrophysiology Initial Consult Note      Meysha Johnson Patient Status:  Inpatient    1950 MRN [id number]   [facility] 3NE-A Attending Thea Prince MD   Hosp Day # 4 PCP Maryanne Benton MD     [facility]         breast       Social Hx:  She  reports that she quit smoking about 32 years ago. She has never used smokeless tobacco. She reports that she does not drink alcohol or use drugs.     Allergies:  No Known Allergies    Medications: Readings from Last 3 Encounters:  12/26/19 : 266 lb (120.7 kg)  12/23/19 : 267 lb 9.6 oz (121.4 kg)  12/13/19 : 272 lb (123.4 kg)      General: Alert and oriented in no apparent distress.   Psych: A+O x 3  Neuro: no focal deficits  HEENT: MMM  Cardiac: RRR, defect on NM stress test  - statin, metoprolol      Diagnosis:  Patient Active Problem List:     Morbid obesity ([facility] [address]     Pure hypercholesterolemia     Type 2 diabetes mellitus ([facility] [address]     Dyspnea     Irritability and anger     Vitamin D deficiency     Acute

## 2019-12-31 NOTE — PROGRESS NOTES
[facility]  Progress Note     Ivyelisacarmen Serafinkhari Patient Status:  Inpatient    1950 MRN [id number]   [facility] [place] 4SW-A Attending Charline Stewart MD   Hosp Day # 5 PCP Charmayne Devoid, MD     Chief Complaint:  N&V, diarrhea    S: feel diarrhea- resolved   1. CT a/p reviewed  2. Resolved    3. Hyperkalemia, due to above, resolved  4. CHF, compensated, as above  5. DL, statin  6. DMII, hyperglycemia protocol, BS controlled  7. PAT resume BB per cards   8. Morbid obesity  BMI 44   9.  Metab Blood sugar controlled, Accu-Cheks ranging from 10 6-1 21    Discharge planning when okay with nephrologist    Ramya Gallardo MD  12/31/2019

## 2019-12-31 NOTE — PLAN OF CARE
Tele monitor reading afib HR up to 120s-130s at times. Monitor tech notified, EKG completed. EKG shows sinus rhythm with marked sinus arrhythmia. In and out of NSR and aflutter per monitor techs. MD notified. Cardiology consulted.

## 2019-12-31 NOTE — PROGRESS NOTES
[facility]  Nephrology Progress Note    Derrill Gain Patient Status:  Inpatient    1950 MRN [id number]   [facility] 4SW-A Attending Inocente Homans, MD   Hosp Day # 5 PCP Rowena Kohli MD       SUBJECTIVE:  No complains       Met kg)  12/10/19 : 276 lb 14.4 oz (125.6 kg)  12/03/19 : 297 lb 6.4 oz (134.9 kg)  08/26/19 : 293 lb 6.4 oz (133.1 kg)      General: Alert and oriented in no apparent distress.   HEENT: No scleral icterus, MMM  Neck: Supple  Cardiac: Regular rate and rhythm, S

## 2020-01-01 VITALS
SYSTOLIC BLOOD PRESSURE: 121 MMHG | HEART RATE: 68 BPM | OXYGEN SATURATION: 96 % | TEMPERATURE: 99 F | RESPIRATION RATE: 19 BRPM | WEIGHT: 266 LBS | DIASTOLIC BLOOD PRESSURE: 57 MMHG | HEIGHT: 65 IN | BODY MASS INDEX: 44.32 KG/M2

## 2020-01-01 LAB
ANION GAP SERPL CALC-SCNC: 7 MMOL/L (ref 0–18)
BUN BLD-MCNC: 45 MG/DL (ref 7–18)
BUN/CREAT SERPL: 9.1 (ref 10–20)
CALCIUM BLD-MCNC: 8.8 MG/DL (ref 8.5–10.1)
CHLORIDE SERPL-SCNC: 101 MMOL/L (ref 98–112)
CO2 SERPL-SCNC: 30 MMOL/L (ref 21–32)
CREAT BLD-MCNC: 4.97 MG/DL (ref 0.55–1.02)
GLUCOSE BLD-MCNC: 121 MG/DL (ref 70–99)
GLUCOSE BLD-MCNC: 123 MG/DL (ref 70–99)
GLUCOSE BLD-MCNC: 96 MG/DL (ref 70–99)
HAV IGM SER QL: 1.7 MG/DL (ref 1.6–2.6)
OSMOLALITY SERPL CALC.SUM OF ELEC: 299 MOSM/KG (ref 275–295)
POTASSIUM SERPL-SCNC: 3.7 MMOL/L (ref 3.5–5.1)
SODIUM SERPL-SCNC: 138 MMOL/L (ref 136–145)

## 2020-01-01 PROCEDURE — 99232 SBSQ HOSP IP/OBS MODERATE 35: CPT | Performed by: INTERNAL MEDICINE

## 2020-01-01 PROCEDURE — 99239 HOSP IP/OBS DSCHRG MGMT >30: CPT | Performed by: INTERNAL MEDICINE

## 2020-01-01 RX ORDER — TORSEMIDE 20 MG/1
20 TABLET ORAL DAILY
Status: DISCONTINUED | OUTPATIENT
Start: 2020-01-01 | End: 2020-01-01

## 2020-01-01 NOTE — PLAN OF CARE
Patient alert and oriented x4. On 2L, .  NSR. Tele monitor reads afib at times. Up to the 120s-130s. MD aware. Denies pain. Blood sugars monitored. R HD cath in place.  cdi. Resting comfortably in bed. Will continue to monitor.       1330: HD ca

## 2020-01-01 NOTE — PLAN OF CARE
NURSING DISCHARGE NOTE    Discharged Home via Wheelchair. Accompanied by Family member  Belongings Taken by patient/family. Patient given discharge paperwork.  at bedside. Educated on follow ups. Both verbalizes understanding.

## 2020-01-01 NOTE — PLAN OF CARE
A&Ox4. 2L O2 while sleeping. NSR on tele with what looks like runs of aflutter when rates are high. HR up to 130's at times, does not sustain. Ash d/c this AM. No complaints of pain. Denies SOB. QID accu checks, no coverage needed.  Right hemodialysis cat Assess the need for suctioning and perform as needed  - Assess and instruct to report SOB or any respiratory difficulty  - Respiratory Therapy support as indicated  - Manage/alleviate anxiety  - Monitor for signs/symptoms of CO2 retention  Outcome: Caroline to adequate nutritional intake and initiate nutrition consult as needed  - Instruct patient on self management of diabetes  Outcome: Progressing  Goal: Electrolytes maintained within normal limits  Description  INTERVENTIONS:  - Monitor labs and rhythm and

## 2020-01-01 NOTE — PHYSICAL THERAPY NOTE
PHYSICAL THERAPY QUICK EVALUATION - INPATIENT     Room Number: 7570/7307-F  Evaluation Date: 1/1/2020  Type of Evaluation: Initial  Physician Order: PT Eval and Treat    Presenting Problem: ARF, dehydration and gastroenteritis  Reason for Therapy:  Conchita Frausto Glasses; Hearing aides    Prior Level of [place]: Pt is indep c ADLs, except assist when don/doff socks and spouse will be SBA PRN when showering; pt's spouse performs all IADLs; pt amb intermittently using SC or RW pending on how she feels    Allie Rockwell bed to a chair (including a wheelchair)?: None   -   Need to walk in hospital room?: None   -   Climbing 3-5 steps with a railing?: A Little       AM-PAC Score:  Raw Score: 23   Approx Degree of Impairment: 11.2%   Standardized Score (AM-PAC Scale): 56.93 mod indep c all t/f and amb using an AD. Pt does not require any further skilled IP PT at this time. Rec DC home.      DISCHARGE RECOMMENDATIONS  PT Discharge Recommendations: Home    PLAN   DC home    Pt able to complete all t/f-at PLOF  Pt able to amb-at

## 2020-01-01 NOTE — DISCHARGE SUMMARY
[facility]  Discharge Summary    Cary Gross Patient Status:  Inpatient    1950 MRN [id number]   [facility] 3NE-A Attending Marga Jacobsen MD   [address] Day # 6 PCP Jose J Clemente MD     Date of Admission: 2019    Date of Dis to hyperkalemia. Patient seen by cardiology for paroxysmal atrial tachycardia, started on low-dose beta-blockers. Patient initially in ICU and seen by pulmonary critical care physician in hospital.  Nausea vomiting and diarrhea on admission resolved.   Me each  Refills:  3     ACCU-CHEK SMARTVIEW Strp      Use to test blood sugar twice a day. E11.9   Quantity:  100 strip  Refills:  3     ACCU-CHEK SMARTVIEW Strp      Use to test blood sugar twice a day .  E11.9   Quantity:  100 strip  Refills:  1     Alcohol regular rate and rhythm  Abdomen: soft, non-tender; bowel sounds normal  Extremities: extremities normal,no cyanosis or edema  Pulses: 2+ and symmetric  Skin: Skin color, texture, turgor normal. No rashes or lesions  Neurologic: Awake,alert,nonfocal  Psych

## 2020-01-01 NOTE — PLAN OF CARE
Pt is A/o x 4. Aleman patent draining yellow urine. Pt is SR/SA often times looking to be like AFib however P waves are present. HR gets into the 140's with any activity, cards aware. Pt has been started on beta blocker. Does not sustain.  Bumex dose given a

## 2020-01-02 ENCOUNTER — PATIENT OUTREACH (OUTPATIENT)
Dept: CASE MANAGEMENT | Age: 70
End: 2020-01-02

## 2020-01-02 ENCOUNTER — TELEPHONE (OUTPATIENT)
Dept: FAMILY MEDICINE CLINIC | Facility: CLINIC | Age: 70
End: 2020-01-02

## 2020-01-02 DIAGNOSIS — N17.9 ACUTE RENAL FAILURE, UNSPECIFIED ACUTE RENAL FAILURE TYPE (HCC): ICD-10-CM

## 2020-01-02 DIAGNOSIS — Z02.9 ENCOUNTERS FOR UNSPECIFIED ADMINISTRATIVE PURPOSE: ICD-10-CM

## 2020-01-02 LAB
ATRIAL RATE: 138 BPM
ATRIAL RATE: 76 BPM
P AXIS: 52 DEGREES
P-R INTERVAL: 142 MS
Q-T INTERVAL: 288 MS
Q-T INTERVAL: 336 MS
QRS DURATION: 100 MS
QRS DURATION: 98 MS
QTC CALCULATION (BEZET): 378 MS
QTC CALCULATION (BEZET): 401 MS
R AXIS: -64 DEGREES
R AXIS: -64 DEGREES
T AXIS: 48 DEGREES
T AXIS: 68 DEGREES
VENTRICULAR RATE: 117 BPM
VENTRICULAR RATE: 76 BPM

## 2020-01-02 PROCEDURE — 1111F DSCHRG MED/CURRENT MED MERGE: CPT

## 2020-01-02 NOTE — PAYOR COMM NOTE
--------------  DISCHARGE REVIEW    Phong Maloney MA [facility]  Subscriber #:  [id number]  Authorization Number: [id number]    Admit date: 12/26/19  Admit time:  1295  Discharge Date: 1/1/2020  5:46 PM     Admitting Physician: Vidhi Sinclair MD  Attending Torrie Luu for the past 2 to 3 days. Patient was admitted earlier this month for CHF exacerbation and was discharged home on oral diuretics as well as lisinopril. She is compliant with her medications.   She states that she has not been eating or drinking much for t above; still recommend for TCM follow-up    Lace+ Score: 79  59-90 High Risk  29-58 Medium Risk  0-28   Low Risk. TCM Follow-Up Recommendation:Huyen Renner   LACE > 58:  High Risk of readmission after discharge from the hospital.      PCP: Aury Peralta [place] prescription monitoring program data reviewed in epic before prescribing narcotics/controlled substances: Not applicable as no new narcotics prescribed at the time of discharge    Follow up Visits:  Follow-up with Jose J Clemente MD in 1 week

## 2020-01-02 NOTE — PROGRESS NOTES
Initial Post Discharge Follow Up   Discharge Date: 1/1/20  Contact Date: 1/2/2020    Consent Verification:  Assessment Completed With: Patient  HIPAA Verified?   Yes    Discharge Dx:  Acute kidney injury due to acute tubular necrosis on chronic kidney di list.  Medications are up to date. Current Outpatient Medications   Medication Sig Dispense Refill   • torsemide 20 MG Oral Tab Take 1 tablet (20 mg total) by mouth daily.  30 tablet 1   • Metoprolol Succinate ER 25 MG Oral Tablet 24 Hr Take 0.5 tablets lbs   Today? 266 lbs  Were you told about any fluid restrictions? No  Have you noticed any shortness of breath or waking up short of breath? no  Since discharge do you feel you are urinating more or less?   More as she was instructed to increase her fluid i changes or updates to medications and or orders sent to PCP.

## 2020-01-02 NOTE — TELEPHONE ENCOUNTER
Spoke to pt for TCM today. Pt does not have HFU appt scheduled at this time as she declined to schedule stating she needs to see too many doctors at this time. TCM/HFU appt recommended by 1-8-20 as pt is at HIGH risk for readmission.     Book by date for

## 2020-01-03 ENCOUNTER — TELEPHONE (OUTPATIENT)
Dept: FAMILY MEDICINE CLINIC | Facility: CLINIC | Age: 70
End: 2020-01-03

## 2020-01-03 ENCOUNTER — TELEPHONE (OUTPATIENT)
Dept: NEPHROLOGY | Facility: CLINIC | Age: 70
End: 2020-01-03

## 2020-01-03 DIAGNOSIS — N18.30 CKD (CHRONIC KIDNEY DISEASE) STAGE 3, GFR 30-59 ML/MIN (HCC): Primary | Chronic | ICD-10-CM

## 2020-01-03 DIAGNOSIS — I50.31 ACUTE DIASTOLIC HEART FAILURE (HCC): ICD-10-CM

## 2020-01-03 DIAGNOSIS — I10 ESSENTIAL HYPERTENSION: ICD-10-CM

## 2020-01-03 DIAGNOSIS — I50.9 ACUTE CONGESTIVE HEART FAILURE, UNSPECIFIED HEART FAILURE TYPE (HCC): ICD-10-CM

## 2020-01-03 NOTE — TELEPHONE ENCOUNTER
Attempted to reach pt, [facility] requesting a return call    Referral placed for Dr. Morena Bar and Dr. Pedro Joshi. Referral already in place for Dr. Wood Moreno. Pt already has follow up scheduled with Cardiology (Dr. Pedro Joshi) on 1/27/2020.

## 2020-01-03 NOTE — TELEPHONE ENCOUNTER
I called pt. She understands about keeping area covered until a scab forms. She has an appt 1-13-20 with Dr. Prosper Quinones. Monday is the only day she can come in. She will call us this Monday to make sure we got labs drawn at [facility] today.

## 2020-01-03 NOTE — TELEPHONE ENCOUNTER
1.  Pt had labs drawn today at [address] [place] [address]  2. Still has bandage on from catheter removal. Is it ok to remove banadage? 3. Has appt 2-3-20 with Dr. Troy Wolfe (she wanted a Monday appt) Do you want to see her sooner?

## 2020-01-03 NOTE — TELEPHONE ENCOUNTER
I don't have access to labs yet. OK to remove bandage and place a normal bandaid; keep covered till scab forms  She should be seen next week if possible. Thanks.  yakov

## 2020-01-03 NOTE — TELEPHONE ENCOUNTER
Home RN states they were ordered to start home care today and the patient is refusing, she has a big dog,  is caring for her, she is fine.    If anything changes in the future, send another referral.

## 2020-01-03 NOTE — TELEPHONE ENCOUNTER
Patient states she was in the hospital recently, they wanted her to f/up with specialists, she believes she needs referrals and has the appts already set up.     Dr. Janice Villafana with Pulmonology 1/7/20  Dr. Amanda Lima for her kidneys on 1/13/20  Is she supposed t

## 2020-01-03 NOTE — TELEPHONE ENCOUNTER
Called pt, offered to schedule TCM with Dr. Allie Parrish, pt declined x2. Advised pt to schedule with Nephrology. Pt expressed understanding and agreement.

## 2020-01-04 LAB
ABSOLUTE BASOPHILS: 39 CELLS/UL (ref 0–200)
ABSOLUTE EOSINOPHILS: 193 CELLS/UL (ref 15–500)
ABSOLUTE LYMPHOCYTES: 1024 CELLS/UL (ref 850–3900)
ABSOLUTE MONOCYTES: 801 CELLS/UL (ref 200–950)
ABSOLUTE NEUTROPHILS: 5644 CELLS/UL (ref 1500–7800)
ALBUMIN/GLOBULIN RATIO: 1.5 (CALC) (ref 1–2.5)
ALBUMIN: 3.8 G/DL (ref 3.6–5.1)
ALKALINE PHOSPHATASE: 49 U/L (ref 33–130)
ALT: 18 U/L (ref 6–29)
AST: 15 U/L (ref 10–35)
BASOPHILS: 0.5 %
BILIRUBIN, TOTAL: 0.5 MG/DL (ref 0.2–1.2)
BUN/CREATININE RATIO: 13 (CALC) (ref 6–22)
BUN: 48 MG/DL (ref 7–25)
CALCIUM: 9.4 MG/DL (ref 8.6–10.4)
CARBON DIOXIDE: 31 MMOL/L (ref 20–32)
CHLORIDE: 98 MMOL/L (ref 98–110)
CHOL/HDLC RATIO: 3.1 (CALC)
CHOLESTEROL, TOTAL: 142 MG/DL
CREATININE: 3.79 MG/DL (ref 0.5–0.99)
EGFR IF AFRICN AM: 13 ML/MIN/1.73M2
EGFR IF NONAFRICN AM: 11 ML/MIN/1.73M2
EOSINOPHILS: 2.5 %
GLOBULIN: 2.6 G/DL (CALC) (ref 1.9–3.7)
GLUCOSE: 127 MG/DL (ref 65–99)
HDL CHOLESTEROL: 46 MG/DL
HEMATOCRIT: 40.1 % (ref 35–45)
HEMOGLOBIN A1C: 7.1 % OF TOTAL HGB
HEMOGLOBIN: 13.7 G/DL (ref 11.7–15.5)
LDL-CHOLESTEROL: 70 MG/DL (CALC)
LYMPHOCYTES: 13.3 %
MCH: 29.8 PG (ref 27–33)
MCHC: 34.2 G/DL (ref 32–36)
MCV: 87.2 FL (ref 80–100)
MONOCYTES: 10.4 %
MPV: 11.4 FL (ref 7.5–12.5)
NEUTROPHILS: 73.3 %
NON-HDL CHOLESTEROL: 96 MG/DL (CALC)
PLATELET COUNT: 145 THOUSAND/UL (ref 140–400)
POTASSIUM: 4.5 MMOL/L (ref 3.5–5.3)
PROTEIN, TOTAL: 6.4 G/DL (ref 6.1–8.1)
RDW: 13.2 % (ref 11–15)
RED BLOOD CELL COUNT: 4.6 MILLION/UL (ref 3.8–5.1)
SODIUM: 141 MMOL/L (ref 135–146)
T4, FREE: 1.4 NG/DL (ref 0.8–1.8)
TRIGLYCERIDES: 180 MG/DL
TSH: 3.65 MIU/L (ref 0.4–4.5)
VITAMIN D, 25-OH, TOTAL: 25 NG/ML (ref 30–100)
WHITE BLOOD CELL COUNT: 7.7 THOUSAND/UL (ref 3.8–10.8)

## 2020-01-13 ENCOUNTER — APPOINTMENT (OUTPATIENT)
Dept: LAB | Age: 70
End: 2020-01-13
Attending: INTERNAL MEDICINE
Payer: MEDICARE

## 2020-01-13 ENCOUNTER — OFFICE VISIT (OUTPATIENT)
Dept: NEPHROLOGY | Facility: CLINIC | Age: 70
End: 2020-01-13
Payer: MEDICARE

## 2020-01-13 ENCOUNTER — TELEPHONE (OUTPATIENT)
Dept: NEPHROLOGY | Facility: CLINIC | Age: 70
End: 2020-01-13

## 2020-01-13 VITALS — SYSTOLIC BLOOD PRESSURE: 122 MMHG | WEIGHT: 259 LBS | DIASTOLIC BLOOD PRESSURE: 76 MMHG | BODY MASS INDEX: 43 KG/M2

## 2020-01-13 DIAGNOSIS — I50.9 ACUTE CONGESTIVE HEART FAILURE, UNSPECIFIED HEART FAILURE TYPE (HCC): ICD-10-CM

## 2020-01-13 DIAGNOSIS — N18.30 CKD (CHRONIC KIDNEY DISEASE) STAGE 3, GFR 30-59 ML/MIN (HCC): ICD-10-CM

## 2020-01-13 DIAGNOSIS — N17.9 AKI (ACUTE KIDNEY INJURY) (HCC): Primary | ICD-10-CM

## 2020-01-13 DIAGNOSIS — N17.9 ACUTE RENAL FAILURE, UNSPECIFIED ACUTE RENAL FAILURE TYPE (HCC): ICD-10-CM

## 2020-01-13 LAB
ANION GAP SERPL CALC-SCNC: 9 MMOL/L (ref 0–18)
BUN BLD-MCNC: 35 MG/DL (ref 7–18)
BUN/CREAT SERPL: 14.2 (ref 10–20)
CALCIUM BLD-MCNC: 9.6 MG/DL (ref 8.5–10.1)
CHLORIDE SERPL-SCNC: 96 MMOL/L (ref 98–112)
CO2 SERPL-SCNC: 30 MMOL/L (ref 21–32)
CREAT BLD-MCNC: 2.47 MG/DL (ref 0.55–1.02)
GLUCOSE BLD-MCNC: 174 MG/DL (ref 70–99)
OSMOLALITY SERPL CALC.SUM OF ELEC: 292 MOSM/KG (ref 275–295)
PATIENT FASTING Y/N/NP: NO
POTASSIUM SERPL-SCNC: 3.8 MMOL/L (ref 3.5–5.1)
SODIUM SERPL-SCNC: 135 MMOL/L (ref 136–145)

## 2020-01-13 PROCEDURE — 99214 OFFICE O/P EST MOD 30 MIN: CPT | Performed by: INTERNAL MEDICINE

## 2020-01-13 PROCEDURE — 80048 BASIC METABOLIC PNL TOTAL CA: CPT | Performed by: INTERNAL MEDICINE

## 2020-01-13 PROCEDURE — 36415 COLL VENOUS BLD VENIPUNCTURE: CPT | Performed by: INTERNAL MEDICINE

## 2020-01-13 NOTE — PROGRESS NOTES
Nephrology Progress Note      Susan Dai is a 71year old female. HPI:   Patient presents with:   Other: JENNIFER      Jorden Holland was seen in the nephrology clinic today in follow-up for management of recent severe acute kidney injury in the setting of ch Smoking status: Former Smoker        Quit date: 1987        Years since quittin.7      Smokeless tobacco: Never Used    Alcohol use: No      Alcohol/week: 0.0 standard drinks    Drug use: No       Medications (Active prior to today's visit):  Curr Abdomen: Soft, non-tender. + bowel sounds, no palpable organomegaly  Extremities: Without clubbing, cyanosis or edema.   Neurologic: Alert and oriented, normal affect, cranial nerves grossly intact, moving all extremities  Skin: Warm and dry, no rashes BLOODURINE Large (A) 12/27/2019    PHURINE 6.5 12/27/2019    PROUR >=300 (A) 12/27/2019    UROBILINOGEN 0.2 12/27/2019    NITRITE Negative 12/27/2019    LEUUR Small (A) 12/27/2019    WBCUR 11-20 (A) 12/27/2019    RBCUR >10 (A) 12/27/2019    EPIUR None Seen

## 2020-01-13 NOTE — TELEPHONE ENCOUNTER
Called pt this AM, repeat creat cont to improve and is at 2.47 mg/dl and advised to have repeat creat in one month

## 2020-01-17 ENCOUNTER — TELEPHONE (OUTPATIENT)
Dept: CARDIOLOGY | Age: 70
End: 2020-01-17

## 2020-01-20 ENCOUNTER — OFFICE VISIT (OUTPATIENT)
Dept: FAMILY MEDICINE CLINIC | Facility: CLINIC | Age: 70
End: 2020-01-20
Payer: MEDICARE

## 2020-01-20 VITALS
WEIGHT: 256.19 LBS | RESPIRATION RATE: 16 BRPM | HEART RATE: 70 BPM | TEMPERATURE: 99 F | SYSTOLIC BLOOD PRESSURE: 120 MMHG | HEIGHT: 65 IN | BODY MASS INDEX: 42.69 KG/M2 | DIASTOLIC BLOOD PRESSURE: 80 MMHG

## 2020-01-20 DIAGNOSIS — N17.9 ACUTE KIDNEY INJURY (HCC): ICD-10-CM

## 2020-01-20 DIAGNOSIS — E66.01 MORBID OBESITY (HCC): ICD-10-CM

## 2020-01-20 DIAGNOSIS — Z12.31 SCREENING MAMMOGRAM FOR HIGH-RISK PATIENT: Primary | ICD-10-CM

## 2020-01-20 DIAGNOSIS — E87.2 METABOLIC ACIDOSIS: ICD-10-CM

## 2020-01-20 DIAGNOSIS — J96.00 ACUTE RESPIRATORY FAILURE, UNSPECIFIED WHETHER WITH HYPOXIA OR HYPERCAPNIA (HCC): ICD-10-CM

## 2020-01-20 DIAGNOSIS — E11.8 CONTROLLED DIABETES MELLITUS TYPE 2 WITH COMPLICATIONS, UNSPECIFIED WHETHER LONG TERM INSULIN USE (HCC): ICD-10-CM

## 2020-01-20 PROCEDURE — 1111F DSCHRG MED/CURRENT MED MERGE: CPT | Performed by: FAMILY MEDICINE

## 2020-01-20 PROCEDURE — 99215 OFFICE O/P EST HI 40 MIN: CPT | Performed by: FAMILY MEDICINE

## 2020-01-20 NOTE — PROGRESS NOTES
HPI:   Trevon Ba is a 71year old female that presents for Patient presents with:  Hospital F/U: From 1/1/2019 -Matthew leg swelling. Patient stated swelling has reduced and is doing much better. Diabetes: Patient brought her blood sugar log.  Last diabetic LANCETS Does not apply Misc, 1 lancet by Finger stick route 2 (two) times daily. E11.9, Disp: 102 each, Rfl: 3  •  Glucose Blood (ACCU-CHEK SMARTVIEW) In Vitro Strip, Use to test blood sugar twice a day.  E11.9, Disp: 100 strip, Rfl: 3  •  Alcohol Swabs (AL Grossly normal     ASSESSMENT AND PLAN:      1. Acute kidney injury ([address]    2. Metabolic acidosis    3. Controlled diabetes mellitus type 2 with complications, unspecified whether long term insulin use ([address]    4.  Acute respiratory failure, unspecified w

## 2020-01-24 RX ORDER — LISINOPRIL 10 MG/1
10 TABLET ORAL DAILY
Refills: 0 | COMMUNITY
Start: 2019-12-19 | End: 2020-01-27 | Stop reason: ALTCHOICE

## 2020-01-27 ENCOUNTER — OFFICE VISIT (OUTPATIENT)
Dept: CARDIOLOGY | Age: 70
End: 2020-01-27

## 2020-01-27 VITALS
DIASTOLIC BLOOD PRESSURE: 62 MMHG | BODY MASS INDEX: 42.32 KG/M2 | HEIGHT: 65 IN | HEART RATE: 98 BPM | SYSTOLIC BLOOD PRESSURE: 110 MMHG | WEIGHT: 254 LBS

## 2020-01-27 DIAGNOSIS — Z86.79 H/O PAROXYSMAL ATRIAL TACHYCARDIA: ICD-10-CM

## 2020-01-27 DIAGNOSIS — I10 ESSENTIAL HYPERTENSION: ICD-10-CM

## 2020-01-27 DIAGNOSIS — I50.32 CHRONIC DIASTOLIC HEART FAILURE (CMD): Primary | ICD-10-CM

## 2020-01-27 PROCEDURE — 3074F SYST BP LT 130 MM HG: CPT | Performed by: INTERNAL MEDICINE

## 2020-01-27 PROCEDURE — 99214 OFFICE O/P EST MOD 30 MIN: CPT | Performed by: INTERNAL MEDICINE

## 2020-01-27 PROCEDURE — 3078F DIAST BP <80 MM HG: CPT | Performed by: INTERNAL MEDICINE

## 2020-01-27 RX ORDER — TORSEMIDE 20 MG/1
20 TABLET ORAL DAILY
Qty: 90 TABLET | Refills: 3 | Status: SHIPPED | OUTPATIENT
Start: 2020-01-27

## 2020-01-27 RX ORDER — PRAVASTATIN SODIUM 40 MG
40 TABLET ORAL NIGHTLY
Qty: 90 TABLET | Refills: 3 | Status: SHIPPED | OUTPATIENT
Start: 2020-01-27

## 2020-01-27 RX ORDER — METOPROLOL SUCCINATE 25 MG/1
TABLET, EXTENDED RELEASE ORAL
Qty: 45 TABLET | Refills: 3 | Status: SHIPPED | OUTPATIENT
Start: 2020-01-27

## 2020-01-27 ASSESSMENT — PATIENT HEALTH QUESTIONNAIRE - PHQ9
2. FEELING DOWN, DEPRESSED OR HOPELESS: NOT AT ALL
SUM OF ALL RESPONSES TO PHQ9 QUESTIONS 1 AND 2: 0
SUM OF ALL RESPONSES TO PHQ9 QUESTIONS 1 AND 2: 0
1. LITTLE INTEREST OR PLEASURE IN DOING THINGS: NOT AT ALL

## 2020-01-28 NOTE — TELEPHONE ENCOUNTER
See attached phone message from [facility] nurse. Pt will see Dr. Ana Laura Richards next week. Pt had labs drawn today at [facility] ordered per both Dr. Ana Laura Richards and Dr. Gabriel Sweeney.      Earlier phone note from this morning's call:  Called pt, offered to schedule TCM with Dr. Gabriel Sweeney [phone]

## 2020-02-08 LAB
BUN/CREATININE RATIO: 13 (CALC) (ref 6–22)
BUN: 23 MG/DL (ref 7–25)
CALCIUM: 9.3 MG/DL (ref 8.6–10.4)
CARBON DIOXIDE: 32 MMOL/L (ref 20–32)
CHLORIDE: 96 MMOL/L (ref 98–110)
CREATININE: 1.74 MG/DL (ref 0.5–0.99)
EGFR IF AFRICN AM: 34 ML/MIN/1.73M2
EGFR IF NONAFRICN AM: 29 ML/MIN/1.73M2
GLUCOSE: 148 MG/DL (ref 65–99)
POTASSIUM: 3.5 MMOL/L (ref 3.5–5.3)
SODIUM: 141 MMOL/L (ref 135–146)

## 2020-02-11 PROBLEM — Z86.79 H/O PAROXYSMAL ATRIAL TACHYCARDIA: Status: ACTIVE | Noted: 2020-01-27

## 2020-02-12 ENCOUNTER — OFFICE VISIT (OUTPATIENT)
Dept: FAMILY MEDICINE CLINIC | Facility: CLINIC | Age: 70
End: 2020-02-12
Payer: MEDICARE

## 2020-02-12 VITALS
SYSTOLIC BLOOD PRESSURE: 130 MMHG | HEART RATE: 90 BPM | HEIGHT: 65 IN | BODY MASS INDEX: 43.15 KG/M2 | DIASTOLIC BLOOD PRESSURE: 82 MMHG | RESPIRATION RATE: 16 BRPM | WEIGHT: 259 LBS | TEMPERATURE: 99 F

## 2020-02-12 DIAGNOSIS — E11.9 TYPE 2 DIABETES MELLITUS WITHOUT COMPLICATION, UNSPECIFIED WHETHER LONG TERM INSULIN USE (HCC): ICD-10-CM

## 2020-02-12 DIAGNOSIS — Z00.00 ROUTINE ADULT HEALTH MAINTENANCE: Primary | ICD-10-CM

## 2020-02-12 DIAGNOSIS — Z78.0 POSTMENOPAUSAL ESTROGEN DEFICIENCY: ICD-10-CM

## 2020-02-12 PROBLEM — M25.562 ACUTE PAIN OF LEFT KNEE: Status: RESOLVED | Noted: 2019-08-27 | Resolved: 2020-02-12

## 2020-02-12 PROBLEM — E86.0 DEHYDRATION: Status: RESOLVED | Noted: 2019-12-26 | Resolved: 2020-02-12

## 2020-02-12 PROBLEM — N17.9 ACUTE KIDNEY INJURY (HCC): Status: RESOLVED | Noted: 2019-12-26 | Resolved: 2020-02-12

## 2020-02-12 PROBLEM — R06.00 DYSPNEA: Status: RESOLVED | Noted: 2017-06-02 | Resolved: 2020-02-12

## 2020-02-12 PROBLEM — N17.9 ACUTE RENAL FAILURE (ARF): Status: RESOLVED | Noted: 2019-12-26 | Resolved: 2020-02-12

## 2020-02-12 PROBLEM — N17.9 ACUTE RENAL FAILURE (ARF) (HCC): Status: RESOLVED | Noted: 2019-12-26 | Resolved: 2020-02-12

## 2020-02-12 PROBLEM — N17.9 ACUTE RENAL FAILURE: Status: RESOLVED | Noted: 2019-12-26 | Resolved: 2020-02-12

## 2020-02-12 PROBLEM — E87.20 METABOLIC ACIDOSIS: Status: RESOLVED | Noted: 2019-12-26 | Resolved: 2020-02-12

## 2020-02-12 PROBLEM — M79.605 PAIN OF LEFT LOWER EXTREMITY: Status: RESOLVED | Noted: 2019-08-27 | Resolved: 2020-02-12

## 2020-02-12 PROBLEM — N17.9 ACUTE KIDNEY INJURY: Status: RESOLVED | Noted: 2019-12-26 | Resolved: 2020-02-12

## 2020-02-12 PROBLEM — N17.9 ACUTE RENAL FAILURE (HCC): Status: RESOLVED | Noted: 2019-12-26 | Resolved: 2020-02-12

## 2020-02-12 PROBLEM — K52.9 GASTROENTERITIS: Status: RESOLVED | Noted: 2019-12-26 | Resolved: 2020-02-12

## 2020-02-12 PROBLEM — J96.00 ACUTE RESPIRATORY FAILURE (HCC): Status: RESOLVED | Noted: 2020-01-20 | Resolved: 2020-02-12

## 2020-02-12 PROBLEM — E87.2 METABOLIC ACIDOSIS: Status: RESOLVED | Noted: 2019-12-26 | Resolved: 2020-02-12

## 2020-02-12 PROCEDURE — G0439 PPPS, SUBSEQ VISIT: HCPCS | Performed by: FAMILY MEDICINE

## 2020-02-12 PROCEDURE — 96160 PT-FOCUSED HLTH RISK ASSMT: CPT | Performed by: FAMILY MEDICINE

## 2020-02-12 PROCEDURE — 99397 PER PM REEVAL EST PAT 65+ YR: CPT | Performed by: FAMILY MEDICINE

## 2020-02-12 NOTE — PROGRESS NOTES
HPI:   Archie Miller is a 71year old female who presents for a MA (Medicare Advantage) Supervisit (Once per calendar year). Pt has no issues with urination. Pt was hospitalized in December for acute renal failure. Pt also has a history of CHF.  Pt c/o of in doing things (over the last two weeks)?: Not at all  Feeling down, depressed, or hopeless (over the last two weeks)?: Not at all  PHQ-2 SCORE: 0     Advanced Directive:   She does NOT have a Living Will on file in [address] [place] [address] Rd.    Discussed with patient and caroline 01/03/2020    ALT 18 01/03/2020    CA 9.3 02/07/2020    ALB 3.8 01/03/2020    TSH 3.65 01/03/2020    CREATSERUM 1.74 (H) 02/07/2020     (H) 02/07/2020        CBC  (most recent labs)   Lab Results   Component Value Date    WBC 7.7 01/03/2020    HGB 1 Right arm, Patient Position: Sitting)   Pulse 90   Temp 98.8 °F (37.1 °C) (Oral)   Resp 16   Ht 65\"   Wt 259 lb (117.5 kg)   BMI 43.10 kg/m²  Estimated body mass index is 43.1 kg/m² as calculated from the following:    Height as of this encounter: 65\". • Pneumococcal (Prevnar 13) 06/20/2017   • Pneumovax 23 09/10/2018   Deferred Date(s) Deferred   • Pneumovax 23 08/16/2016        ASSESSMENT AND OTHER RELEVANT CHRONIC CONDITIONS:   Sanjuanita Nair is a 71year old female who presents for a Medicare Assess 7.1, no meds at this time), monitor bs     Dyslipidemia, stable     Hyponatremia resolved     Acute renal failure, unspecified acute renal failure type (HCC)resolved     Hyperkalemia stable resolved     ATN (acute tubular necrosis) (HCC) resolved     H/O p Screening      Dexascan Every two years No results found for this or any previous visit. No flowsheet data found.     Pap and Pelvic      Pap: Every 3 yrs age 21-65 or Pap+HPV every 5 yrs age 33-67, age 72 and older at high risk There are no preventive care 02/07/2020 1.74 (H)    No flowsheet data found. Drug Serum Conc  Annually No results found for: DIGOXIN, DIG, VALP No flowsheet data found.        Diabetes      HgbA1C  Annually HEMOGLOBIN A1c (% of total Hgb)   Date Value   01/03/2020 7.1 (H)       No

## 2020-02-18 ENCOUNTER — PATIENT MESSAGE (OUTPATIENT)
Dept: FAMILY MEDICINE CLINIC | Facility: CLINIC | Age: 70
End: 2020-02-18

## 2020-02-18 DIAGNOSIS — R06.00 DYSPNEA, UNSPECIFIED TYPE: Primary | ICD-10-CM

## 2020-02-18 NOTE — TELEPHONE ENCOUNTER
From: Jeff Carbajal  To: Stephane Ortiz MD  Sent: 2/18/2020 9:28 AM CST  Subject: Referral Request    I need a referral for Dr. Kelsey Matthews [facility]) my appointment is Feb. 24 at 12:30.      Thank You  Gregory Jeter

## 2020-02-28 ENCOUNTER — TELEPHONE (OUTPATIENT)
Dept: FAMILY MEDICINE CLINIC | Facility: CLINIC | Age: 70
End: 2020-02-28

## 2020-02-28 NOTE — TELEPHONE ENCOUNTER
Contacted the referral dept, Spoke to [place], requesting referral to be back dated.  Referral dept will be faxing over updated referral

## 2020-02-28 NOTE — TELEPHONE ENCOUNTER
Patient calling, asking if provider can send referral or preauth for a OV with Dr Camila Guy. Seen Dr Camila Guy on 1-7-20, patient receiving bill.  Linda asking for referral/preauth and they will cover the visit [employer] [phone]

## 2020-02-29 ENCOUNTER — HOSPITAL ENCOUNTER (OUTPATIENT)
Dept: BONE DENSITY | Age: 70
Discharge: HOME | End: 2020-02-29
Attending: FAMILY MEDICINE
Payer: MEDICARE

## 2020-02-29 DIAGNOSIS — Z78.0 POSTMENOPAUSAL ESTROGEN DEFICIENCY: ICD-10-CM

## 2020-02-29 PROCEDURE — 77080 DXA BONE DENSITY AXIAL: CPT | Performed by: FAMILY MEDICINE

## 2020-03-03 ENCOUNTER — PATIENT MESSAGE (OUTPATIENT)
Dept: FAMILY MEDICINE CLINIC | Facility: CLINIC | Age: 70
End: 2020-03-03

## 2020-03-04 RX ORDER — PRAVASTATIN SODIUM 40 MG
TABLET ORAL
Qty: 90 TABLET | Refills: 1 | Status: SHIPPED | OUTPATIENT
Start: 2020-03-04 | End: 2020-06-23

## 2020-03-04 RX ORDER — PEN NEEDLE, DIABETIC 31 GX5/16"
NEEDLE, DISPOSABLE MISCELLANEOUS
Qty: 200 EACH | Refills: 3 | Status: SHIPPED | OUTPATIENT
Start: 2020-03-04 | End: 2020-05-05

## 2020-03-04 RX ORDER — LANCETS
1 EACH MISCELLANEOUS 2 TIMES DAILY
Qty: 102 EACH | Refills: 3 | Status: SHIPPED | OUTPATIENT
Start: 2020-03-04 | End: 2020-10-13

## 2020-03-04 RX ORDER — BLOOD SUGAR DIAGNOSTIC
STRIP MISCELLANEOUS
Qty: 100 STRIP | Refills: 3 | Status: SHIPPED | OUTPATIENT
Start: 2020-03-04 | End: 2020-05-05

## 2020-03-04 NOTE — TELEPHONE ENCOUNTER
From: Farzaneh Castaneda  To: Alin Charles MD  Sent: 3/3/2020 4:43 PM CST  Subject: Prescription Question    I currently have my prescriptions filled at [facility]. I would like to have all my perscriptions sent to and filled by Nelda Pedersen.  Please

## 2020-03-04 NOTE — TELEPHONE ENCOUNTER
Pravastatin Sodium 40 MG Oral Tab              Sig:  TAKE 1 TABLET BY MOUTH NIGHTLY    Disp:  90 tablet    Refills:  1    Start: 3/4/2020    Class: Normal    Non-formulary    Last ordered: 7 months ago by Divya Calzada MD     Cholesterol Medication Fuad

## 2020-03-13 ENCOUNTER — PATIENT MESSAGE (OUTPATIENT)
Dept: FAMILY MEDICINE CLINIC | Facility: CLINIC | Age: 70
End: 2020-03-13

## 2020-03-14 ENCOUNTER — PATIENT MESSAGE (OUTPATIENT)
Dept: FAMILY MEDICINE CLINIC | Facility: CLINIC | Age: 70
End: 2020-03-14

## 2020-03-16 RX ORDER — TORSEMIDE 20 MG/1
20 TABLET ORAL DAILY
Qty: 90 TABLET | Refills: 1 | Status: SHIPPED | OUTPATIENT
Start: 2020-03-16 | End: 2020-03-20

## 2020-03-16 RX ORDER — METOPROLOL SUCCINATE 25 MG/1
12.5 TABLET, EXTENDED RELEASE ORAL
Qty: 45 TABLET | Refills: 1 | Status: SHIPPED | OUTPATIENT
Start: 2020-03-16 | End: 2020-03-20

## 2020-03-16 NOTE — TELEPHONE ENCOUNTER
From: Ninoska Shane  To: Anna Saavedra MD  Sent: 3/14/2020 10:45 AM CDT  Subject: Prescription Question    I would like a perscription refilled.  I went in my Chart to refill a perscription, and it said these perscriptions are not available for refill on

## 2020-03-18 NOTE — TELEPHONE ENCOUNTER
----- Message from Charo Miller sent at 3/10/2020  8:40 PM CDT -----  Regarding: Referral Request  Contact: [phone]  I need a referral for an appt I had at Dr. Lin Found office Pullmonologist (Lung) the appt. was 2/24/2020.     I will also need a ref

## 2020-03-19 NOTE — TELEPHONE ENCOUNTER
----- Message from Rose Russo sent at 3/13/2020 12:44 PM CDT -----  Regarding: Other  Contact: [phone]  I have gone to Dr Mikal Sneed and I have an appt.  with Dr. Radha Thurston from that same office as Dr. Mikal Sneed, these doctors are all lung specialist

## 2020-03-20 ENCOUNTER — TELEPHONE (OUTPATIENT)
Dept: FAMILY MEDICINE CLINIC | Facility: CLINIC | Age: 70
End: 2020-03-20

## 2020-03-20 RX ORDER — METOPROLOL SUCCINATE 25 MG/1
12.5 TABLET, EXTENDED RELEASE ORAL
Qty: 45 TABLET | Refills: 1 | Status: SHIPPED | OUTPATIENT
Start: 2020-03-20 | End: 2020-06-23

## 2020-03-20 RX ORDER — TORSEMIDE 20 MG/1
20 TABLET ORAL DAILY
Qty: 90 TABLET | Refills: 1 | Status: SHIPPED | OUTPATIENT
Start: 2020-03-20 | End: 2020-06-23

## 2020-03-20 NOTE — TELEPHONE ENCOUNTER
Spoke with patient and she would like all RX to be sent to [facility] including Torsemide and Metoprolol. Anson Blackmon was called and cancelled the RXs sent on 3/16/2020. Torsemide and Metoprolol sent to [facility].

## 2020-03-20 NOTE — TELEPHONE ENCOUNTER
[facility] pharmacy calling, will like refill on medications. Asking for Metoprolol ER 25mg, Torsemide 20mg to be sent.  Order # [id number]

## 2020-04-02 ENCOUNTER — TELEPHONE (OUTPATIENT)
Dept: FAMILY MEDICINE CLINIC | Facility: CLINIC | Age: 70
End: 2020-04-02

## 2020-04-06 ENCOUNTER — TELEPHONE (OUTPATIENT)
Dept: FAMILY MEDICINE CLINIC | Facility: CLINIC | Age: 70
End: 2020-04-06

## 2020-04-06 DIAGNOSIS — Z09 HOSPITAL DISCHARGE FOLLOW-UP: Primary | ICD-10-CM

## 2020-04-06 NOTE — TELEPHONE ENCOUNTER
----- Message from Jorge Frye sent at 4/1/2020  7:35 AM CDT -----  Regarding: FW: Referral Request  Contact: [phone]    ----- Message -----  From: Farzaneh Castaneda  Sent: 3/31/2020   7:11 PM CDT  To: Blayne Barajas Care Managers  Subject: RE: Referral Reque

## 2020-04-16 ENCOUNTER — PATIENT MESSAGE (OUTPATIENT)
Dept: NEPHROLOGY | Facility: CLINIC | Age: 70
End: 2020-04-16

## 2020-04-16 ENCOUNTER — PATIENT MESSAGE (OUTPATIENT)
Dept: FAMILY MEDICINE CLINIC | Facility: CLINIC | Age: 70
End: 2020-04-16

## 2020-04-16 DIAGNOSIS — E11.9 TYPE 2 DIABETES MELLITUS WITHOUT COMPLICATION, UNSPECIFIED WHETHER LONG TERM INSULIN USE (HCC): Primary | ICD-10-CM

## 2020-04-16 NOTE — TELEPHONE ENCOUNTER
From: Miguel Ramos  To: Laura Scott MD  Sent: 4/16/2020 9:34 AM CDT  Subject: Other    I know I was scheduled to have a blood test in May, I will have to wait until they open everything, due to the corona virus.  Can you please send me the order for th

## 2020-04-16 NOTE — TELEPHONE ENCOUNTER
From: Susan Dai  To: Jessa Vaughn MD  Sent: 4/16/2020 9:30 AM CDT  Subject: Other    I have an appt. with Dr. Emmanuelle Miranda on May 11. Due to the corona virus should I cancel this appt. and call to re-schedule this appt. ?    Thank Murray Cockayne

## 2020-05-06 RX ORDER — BLOOD SUGAR DIAGNOSTIC
STRIP MISCELLANEOUS
Qty: 200 STRIP | Refills: 3 | Status: SHIPPED | OUTPATIENT
Start: 2020-05-06 | End: 2021-04-21

## 2020-05-06 RX ORDER — PEN NEEDLE, DIABETIC 31 GX5/16"
NEEDLE, DISPOSABLE MISCELLANEOUS
Qty: 200 EACH | Refills: 3 | Status: SHIPPED | OUTPATIENT
Start: 2020-05-06

## 2020-05-06 NOTE — TELEPHONE ENCOUNTER
Diabetic Supplies Protocol Passed5/5 4:46 PM   Appointment in the past 12 or next 3 months     Requesting Glucose Blood (ACCU-CHEK SMARTVIEW) In Vitro Strip, Alcohol Swabs (ALCOHOL PREP) Does not apply Pads  LOV: 2/12/20  RTC: 1 month  Last Relevant Labs:

## 2020-05-13 ENCOUNTER — TELEPHONE (OUTPATIENT)
Dept: CARDIOLOGY | Age: 70
End: 2020-05-13

## 2020-06-23 RX ORDER — TORSEMIDE 20 MG/1
20 TABLET ORAL DAILY
Qty: 90 TABLET | Refills: 1 | Status: SHIPPED | OUTPATIENT
Start: 2020-06-23 | End: 2020-08-14

## 2020-06-23 RX ORDER — PRAVASTATIN SODIUM 40 MG
TABLET ORAL
Qty: 90 TABLET | Refills: 1 | Status: SHIPPED | OUTPATIENT
Start: 2020-06-23 | End: 2020-08-14

## 2020-06-23 RX ORDER — METOPROLOL SUCCINATE 25 MG/1
12.5 TABLET, EXTENDED RELEASE ORAL
Qty: 45 TABLET | Refills: 1 | Status: SHIPPED | OUTPATIENT
Start: 2020-06-23 | End: 2020-08-14

## 2020-06-23 NOTE — TELEPHONE ENCOUNTER
Cholesterol Medication Protocol Passed6/23 2:07 PM   ALT < 80    ALT resulted within past year    Lipid panel within past 12 months    Appointment within past 12 or next 3 months       Hypertension Medications Protocol Passed6/23 2:07 PM   CMP or BMP in pa

## 2020-07-12 ENCOUNTER — PATIENT MESSAGE (OUTPATIENT)
Dept: FAMILY MEDICINE CLINIC | Facility: CLINIC | Age: 70
End: 2020-07-12

## 2020-07-13 ENCOUNTER — OFFICE VISIT (OUTPATIENT)
Dept: NEPHROLOGY | Facility: CLINIC | Age: 70
End: 2020-07-13
Payer: MEDICARE

## 2020-07-13 VITALS — SYSTOLIC BLOOD PRESSURE: 122 MMHG | WEIGHT: 253.63 LBS | DIASTOLIC BLOOD PRESSURE: 74 MMHG | BODY MASS INDEX: 42 KG/M2

## 2020-07-13 DIAGNOSIS — N18.30 CKD (CHRONIC KIDNEY DISEASE) STAGE 3, GFR 30-59 ML/MIN (HCC): Primary | ICD-10-CM

## 2020-07-13 DIAGNOSIS — I10 ESSENTIAL HYPERTENSION: ICD-10-CM

## 2020-07-13 PROCEDURE — 99214 OFFICE O/P EST MOD 30 MIN: CPT | Performed by: INTERNAL MEDICINE

## 2020-07-13 NOTE — PROGRESS NOTES
Nephrology Progress Note      Sander Dunn is a 71year old female. HPI:   Patient presents with:  Chronic Kidney Disease  Hypertension      Mrs. Oleg Castillo was seen in the nephrology clinic today in follow-up for management of chronic kidney disease stage to today's visit):  Current Outpatient Medications   Medication Sig Dispense Refill   • Pravastatin Sodium 40 MG Oral Tab TAKE 1 TABLET BY MOUTH NIGHTLY 90 tablet 1   • torsemide 20 MG Oral Tab Take 1 tablet (20 mg total) by mouth daily.  90 tablet 1   • Me affect, cranial nerves grossly intact, moving all extremities  Skin: Warm and dry, no rashes      LABS:     Lab Results   Component Value Date    BUN 24 05/20/2020    BUNCREA 14.2 01/13/2020    CREATSERUM 1.23 (H) 05/20/2020    ANIONGAP 9 01/13/2020    GFR 12/27/2019    WBCUR 11-20 (A) 12/27/2019    RBCUR >10 (A) 12/27/2019    EPIUR None Seen 12/27/2019    BACUR 2+ (A) 12/27/2019     ASSESSMENT/PLAN:     #1.  Acute kidney injury/chronic kidney disease stage III-she had longstanding chronic kidney disease lik

## 2020-07-14 NOTE — TELEPHONE ENCOUNTER
From: Juliocesar Cardona  To: Jcarlos Leahy MD  Sent: 7/12/2020 11:06 AM CDT  Subject: Referral Request    There have been several referrals sent from your office to [facility] (Dr. Gayathri Tello) Pulmonary.  They keep billing me for the Jan. 7 visit a

## 2020-07-17 ENCOUNTER — PATIENT MESSAGE (OUTPATIENT)
Dept: FAMILY MEDICINE CLINIC | Facility: CLINIC | Age: 70
End: 2020-07-17

## 2020-07-17 DIAGNOSIS — I50.9 ACUTE CONGESTIVE HEART FAILURE, UNSPECIFIED HEART FAILURE TYPE (HCC): Primary | ICD-10-CM

## 2020-07-17 DIAGNOSIS — Z12.31 ENCOUNTER FOR SCREENING MAMMOGRAM FOR MALIGNANT NEOPLASM OF BREAST: Primary | ICD-10-CM

## 2020-07-17 DIAGNOSIS — I50.31 ACUTE DIASTOLIC HEART FAILURE (HCC): ICD-10-CM

## 2020-07-17 NOTE — TELEPHONE ENCOUNTER
From: Shey Gore  To: Joseluis Bray MD  Sent: 7/17/2020 1:07 PM CDT  Subject: Referral Request    I have an appointment with Dr. Heladio Bledsoe (cardiology) on August 10 at 9:30 A. M. Can you please send a referral for this Doctors Appt.  The fax # is [phone]

## 2020-07-20 ENCOUNTER — PATIENT MESSAGE (OUTPATIENT)
Dept: FAMILY MEDICINE CLINIC | Facility: CLINIC | Age: 70
End: 2020-07-20

## 2020-07-20 NOTE — TELEPHONE ENCOUNTER
From: Carlyn Nicolas  To: Lacy Hernandez MD  Sent: 7/20/2020 8:17 AM CDT  Subject: Other    My morning blood sugar tests have been rising. July 18 my morning reading was 143. July 19 my morning test was 158 and today July 20 my morning test was 168.  I am n

## 2020-07-27 ENCOUNTER — OFFICE VISIT (OUTPATIENT)
Dept: FAMILY MEDICINE CLINIC | Facility: CLINIC | Age: 70
End: 2020-07-27
Payer: MEDICARE

## 2020-07-27 VITALS
BODY MASS INDEX: 42.79 KG/M2 | TEMPERATURE: 97 F | SYSTOLIC BLOOD PRESSURE: 105 MMHG | OXYGEN SATURATION: 96 % | HEART RATE: 61 BPM | WEIGHT: 256.81 LBS | DIASTOLIC BLOOD PRESSURE: 72 MMHG | HEIGHT: 65 IN

## 2020-07-27 DIAGNOSIS — E78.5 DYSLIPIDEMIA: ICD-10-CM

## 2020-07-27 DIAGNOSIS — E11.9 TYPE 2 DIABETES MELLITUS WITHOUT COMPLICATION, UNSPECIFIED WHETHER LONG TERM INSULIN USE (HCC): Primary | ICD-10-CM

## 2020-07-27 DIAGNOSIS — E66.01 MORBID OBESITY (HCC): ICD-10-CM

## 2020-07-27 PROCEDURE — 3008F BODY MASS INDEX DOCD: CPT | Performed by: FAMILY MEDICINE

## 2020-07-27 PROCEDURE — 3074F SYST BP LT 130 MM HG: CPT | Performed by: FAMILY MEDICINE

## 2020-07-27 PROCEDURE — 3078F DIAST BP <80 MM HG: CPT | Performed by: FAMILY MEDICINE

## 2020-07-27 PROCEDURE — 99214 OFFICE O/P EST MOD 30 MIN: CPT | Performed by: FAMILY MEDICINE

## 2020-07-29 PROBLEM — D69.6 THROMBOCYTOPENIA (HCC): Chronic | Status: ACTIVE | Noted: 2020-07-29

## 2020-07-29 PROBLEM — D69.6 THROMBOCYTOPENIA: Chronic | Status: ACTIVE | Noted: 2020-07-29

## 2020-07-29 NOTE — PROGRESS NOTES
HPI:   Irma Kaba is a 71year old female that presents for Patient presents with:  Diabetes: DM - Health Maintenance up to date . Is due for a repeat A1C     Patient is here for follow-up of diabetes high cholesterol Terrell.   She feels well and has n Comprehensive ROS negative unless noted in HPI    PHYSICAL EXAM:   /72 (BP Location: Right arm, Patient Position: Sitting)   Pulse 61   Temp 97.3 °F (36.3 °C) (Temporal)   Ht 65\"   Wt 256 lb 12.8 oz (116.5 kg)   SpO2 96%   BMI 42.73 kg/m²  Estim No follow-ups on file. Jcarlos Leahy M.D.   Family Medicine   7/29/2020  9:37 AM

## 2020-08-03 ENCOUNTER — HOSPITAL ENCOUNTER (OUTPATIENT)
Dept: MAMMOGRAPHY | Age: 70
Discharge: HOME | End: 2020-08-03
Attending: FAMILY MEDICINE

## 2020-08-03 DIAGNOSIS — Z12.31 ENCOUNTER FOR SCREENING MAMMOGRAM FOR MALIGNANT NEOPLASM OF BREAST: ICD-10-CM

## 2020-08-03 PROCEDURE — 77063 BREAST TOMOSYNTHESIS BI: CPT

## 2020-08-10 ENCOUNTER — OFFICE VISIT (OUTPATIENT)
Dept: CARDIOLOGY | Age: 70
End: 2020-08-10

## 2020-08-10 VITALS
HEART RATE: 73 BPM | WEIGHT: 253 LBS | SYSTOLIC BLOOD PRESSURE: 118 MMHG | BODY MASS INDEX: 42.1 KG/M2 | DIASTOLIC BLOOD PRESSURE: 62 MMHG

## 2020-08-10 DIAGNOSIS — E78.00 PURE HYPERCHOLESTEROLEMIA: ICD-10-CM

## 2020-08-10 DIAGNOSIS — I10 ESSENTIAL HYPERTENSION: Primary | ICD-10-CM

## 2020-08-10 DIAGNOSIS — I50.32 CHRONIC DIASTOLIC HEART FAILURE (CMD): ICD-10-CM

## 2020-08-10 DIAGNOSIS — Z86.79 H/O PAROXYSMAL ATRIAL TACHYCARDIA: ICD-10-CM

## 2020-08-10 PROBLEM — E78.5 HYPERLIPIDEMIA: Status: ACTIVE | Noted: 2020-08-10

## 2020-08-10 PROCEDURE — 3074F SYST BP LT 130 MM HG: CPT | Performed by: INTERNAL MEDICINE

## 2020-08-10 PROCEDURE — 99214 OFFICE O/P EST MOD 30 MIN: CPT | Performed by: INTERNAL MEDICINE

## 2020-08-10 PROCEDURE — 3078F DIAST BP <80 MM HG: CPT | Performed by: INTERNAL MEDICINE

## 2020-08-10 SDOH — HEALTH STABILITY: PHYSICAL HEALTH: ON AVERAGE, HOW MANY DAYS PER WEEK DO YOU ENGAGE IN MODERATE TO STRENUOUS EXERCISE (LIKE A BRISK WALK)?: 7 DAYS

## 2020-08-10 SDOH — HEALTH STABILITY: PHYSICAL HEALTH: ON AVERAGE, HOW MANY MINUTES DO YOU ENGAGE IN EXERCISE AT THIS LEVEL?: 30 MIN

## 2020-08-10 ASSESSMENT — PATIENT HEALTH QUESTIONNAIRE - PHQ9
CLINICAL INTERPRETATION OF PHQ9 SCORE: NO FURTHER SCREENING NEEDED
1. LITTLE INTEREST OR PLEASURE IN DOING THINGS: NOT AT ALL
2. FEELING DOWN, DEPRESSED OR HOPELESS: NOT AT ALL
SUM OF ALL RESPONSES TO PHQ9 QUESTIONS 1 AND 2: 0
SUM OF ALL RESPONSES TO PHQ9 QUESTIONS 1 AND 2: 0
CLINICAL INTERPRETATION OF PHQ2 SCORE: NO FURTHER SCREENING NEEDED

## 2020-08-13 LAB — HEMOGLOBIN A1C: 7 % OF TOTAL HGB

## 2020-08-17 RX ORDER — METOPROLOL SUCCINATE 25 MG/1
12.5 TABLET, EXTENDED RELEASE ORAL
Qty: 45 TABLET | Refills: 1 | Status: SHIPPED | OUTPATIENT
Start: 2020-08-17 | End: 2020-10-13

## 2020-08-17 RX ORDER — TORSEMIDE 20 MG/1
20 TABLET ORAL DAILY
Qty: 90 TABLET | Refills: 1 | Status: SHIPPED | OUTPATIENT
Start: 2020-08-17 | End: 2021-01-01

## 2020-08-17 RX ORDER — PRAVASTATIN SODIUM 40 MG
TABLET ORAL
Qty: 90 TABLET | Refills: 1 | Status: SHIPPED | OUTPATIENT
Start: 2020-08-17 | End: 2021-02-05

## 2020-08-17 NOTE — TELEPHONE ENCOUNTER
Pravastatin Sodium 40 MG Oral Tab               Sig:  TAKE 1 TABLET BY MOUTH NIGHTLY    Disp:  90 tablet    Refills:  1    Start: 8/14/2020    Class: Normal    Non-formulary    Last ordered: 1 month ago by Jose J Clemente MD     Cholesterol Medication Fuad

## 2020-09-02 ENCOUNTER — PATIENT MESSAGE (OUTPATIENT)
Dept: CASE MANAGEMENT | Age: 70
End: 2020-09-02

## 2020-09-03 ENCOUNTER — PATIENT MESSAGE (OUTPATIENT)
Dept: CASE MANAGEMENT | Age: 70
End: 2020-09-03

## 2020-09-15 ENCOUNTER — TELEPHONE (OUTPATIENT)
Dept: INTERNAL MEDICINE CLINIC | Facility: CLINIC | Age: 70
End: 2020-09-15

## 2020-09-15 NOTE — TELEPHONE ENCOUNTER
Received Notification of vaccination from [facility]. Patient received flu vaccine high dose on 9/13/20. Immunization records have been updated. Form placed on Dr.Dongre barclay to be review and sign off.

## 2020-09-17 ENCOUNTER — PATIENT MESSAGE (OUTPATIENT)
Dept: FAMILY MEDICINE CLINIC | Facility: CLINIC | Age: 70
End: 2020-09-17

## 2020-09-17 RX ORDER — A/SINGAPORE/GP1908/2015 IVR-180 (AN A/MICHIGAN/45/2015 (H1N1)PDM09-LIKE VIRUS, A/HONG KONG/4801/2014, NYMC X-263B (H3N2) (AN A/HONG KONG/4801/2014-LIKE VIRUS), AND B/BRISBANE/60/2008, WILD TYPE (A B/BRISBANE/60/2008-LIKE VIRUS) 15; 15; 15 UG/.5ML; UG/.5ML; UG/.5ML
0.5 INJECTION, SUSPENSION INTRAMUSCULAR ONCE
COMMUNITY
Start: 2020-09-13 | End: 2020-11-23 | Stop reason: ALTCHOICE

## 2020-09-17 NOTE — TELEPHONE ENCOUNTER
From: Myesha Johnson  To: Maryanne Benton MD  Sent: 9/17/2020 10:23 AM CDT  Subject: Other    I had my flu shot on 9/13/2020 at [facility].     Thank Greg Riley

## 2020-10-13 RX ORDER — LANCETS
1 EACH MISCELLANEOUS 2 TIMES DAILY
Qty: 200 EACH | Refills: 3 | Status: SHIPPED | OUTPATIENT
Start: 2020-10-13 | End: 2021-10-13

## 2020-10-13 RX ORDER — METOPROLOL SUCCINATE 25 MG/1
12.5 TABLET, EXTENDED RELEASE ORAL
Qty: 45 TABLET | Refills: 1 | Status: SHIPPED | OUTPATIENT
Start: 2020-10-13 | End: 2021-01-01

## 2020-10-13 NOTE — TELEPHONE ENCOUNTER
Refill protocol passed because the patient met the following protocol for    Requested Prescriptions     Pending Prescriptions Disp Refills   • Accu-Chek FastClix Lancets Does not apply Misc 102 each 3     Si lancet by Finger stick route 2 (two) times

## 2020-10-24 ENCOUNTER — PATIENT MESSAGE (OUTPATIENT)
Dept: CASE MANAGEMENT | Age: 70
End: 2020-10-24

## 2020-10-24 DIAGNOSIS — E11.8 CONTROLLED DIABETES MELLITUS TYPE 2 WITH COMPLICATIONS, UNSPECIFIED WHETHER LONG TERM INSULIN USE (HCC): Primary | ICD-10-CM

## 2020-10-24 DIAGNOSIS — E55.9 VITAMIN D DEFICIENCY: ICD-10-CM

## 2020-10-26 NOTE — TELEPHONE ENCOUNTER
Last Vitamin D on 1/3/20 was 25  Last A1C on 8/12/20 was 7.0 and patient was supposed to schedule follow up appt to discuss with Dr. Jenene Cabot. Lab orders placed. [facility] sent to patient to schedule f/u appt.

## 2020-10-26 NOTE — TELEPHONE ENCOUNTER
From: Jammie Irizarry  To: Leah CORONADO  Sent: 10/24/2020 9:58 AM CDT  Subject: Non-Urgent Medical Question    Do I need to take an A1c test in November? And should I also have a test done for vitamin D?  If I need to take these tests will you please send an order

## 2020-11-07 NOTE — PROGRESS NOTES
[facility]  Nephrology Progress Note    Suri Pina Patient Status:  Inpatient    1950 MRN [id number]   [facility] 4SW-A Attending Mica Ozuna MD   Hosp Day # 6 PCP Vero Lau MD       SUBJECTIVE:  No complains  Feels we Readings from Last 6 Encounters:  12/26/19 : 266 lb (120.7 kg)  12/23/19 : 267 lb 9.6 oz (121.4 kg)  12/13/19 : 272 lb (123.4 kg)  12/10/19 : 276 lb 14.4 oz (125.6 kg)  12/03/19 : 297 lb 6.4 oz (134.9 kg)  08/26/19 : 293 lb 6.4 oz (133.1 kg)      General: and/or family by bedside.       Steve Figueroas  1/1/2020 Unknown if ever smoked

## 2020-11-16 ENCOUNTER — PATIENT MESSAGE (OUTPATIENT)
Dept: FAMILY MEDICINE CLINIC | Facility: CLINIC | Age: 70
End: 2020-11-16

## 2020-11-16 NOTE — TELEPHONE ENCOUNTER
From: Roberto Tapia  To: Columbus Rubinstein, MD  Sent: 11/16/2020 9:08 AM CST  Subject: Other    I just came back from my appt.  at [facility], they had an order in the computer for a A1c test and a metabolic panel test. They had no order for a vitamin D test? I tho

## 2020-11-17 ENCOUNTER — PATIENT MESSAGE (OUTPATIENT)
Dept: FAMILY MEDICINE CLINIC | Facility: CLINIC | Age: 70
End: 2020-11-17

## 2020-11-17 NOTE — TELEPHONE ENCOUNTER
From: Rosa Elena Urbina  To: Karla Mcgarry MD  Sent: 11/17/2020 12:59 PM CST  Subject: Other    [address] is where I receive all my prescriptions. I will start taking the metformin the doctor ordered as soon as I receive it.  I will also keep track of m

## 2020-11-17 NOTE — TELEPHONE ENCOUNTER
From: Myesha Johnson  To: Maryanne Benton MD  Sent: 11/17/2020 8:30 AM CST  Subject: Test Results Question    Do I need to continue with the same medication? Do I need to go on diabetic medication? Please comment on my test results.     Thanks,  Bettina Linda

## 2020-11-17 NOTE — TELEPHONE ENCOUNTER
I sent metformin 500mg to take 1 tablet every evening before supper. Keep log of blood sugars morning fasting. Let me know numbers in about 3 wks.

## 2020-11-23 ENCOUNTER — OFFICE VISIT (OUTPATIENT)
Dept: FAMILY MEDICINE CLINIC | Facility: CLINIC | Age: 70
End: 2020-11-23
Payer: MEDICARE

## 2020-11-23 VITALS
DIASTOLIC BLOOD PRESSURE: 60 MMHG | SYSTOLIC BLOOD PRESSURE: 116 MMHG | RESPIRATION RATE: 14 BRPM | WEIGHT: 261.63 LBS | BODY MASS INDEX: 43.59 KG/M2 | HEART RATE: 78 BPM | HEIGHT: 65 IN | OXYGEN SATURATION: 98 %

## 2020-11-23 DIAGNOSIS — S03.40XA SPRAIN OF TEMPOROMANDIBULAR JOINT, INITIAL ENCOUNTER: Primary | ICD-10-CM

## 2020-11-23 PROCEDURE — 3078F DIAST BP <80 MM HG: CPT | Performed by: FAMILY MEDICINE

## 2020-11-23 PROCEDURE — 3008F BODY MASS INDEX DOCD: CPT | Performed by: FAMILY MEDICINE

## 2020-11-23 PROCEDURE — 99213 OFFICE O/P EST LOW 20 MIN: CPT | Performed by: FAMILY MEDICINE

## 2020-11-23 PROCEDURE — 3074F SYST BP LT 130 MM HG: CPT | Performed by: FAMILY MEDICINE

## 2020-11-23 RX ORDER — CHLORHEXIDINE GLUCONATE 0.12 MG/ML
RINSE ORAL
COMMUNITY
Start: 2020-11-18 | End: 2021-09-20

## 2020-11-23 NOTE — PROGRESS NOTES
HPI:   Ninoska Shane is a 79year old female that presents for complaint of having pain over the left ear region but in front of the ear over the jaw area. Is been there for the past several days or more. No fever chills no cold symptoms.     Past medical sugar E11.9, Disp: 200 each, Rfl: 3    REVIEW OF SYSTEMS:     Comprehensive ROS negative unless noted in HPI    PHYSICAL EXAM:   /60 (BP Location: Left arm, Patient Position: Sitting, Cuff Size: large)   Pulse 78   Resp 14   Ht 65\"   Wt 261 lb 9.6 o

## 2020-12-16 ENCOUNTER — PATIENT MESSAGE (OUTPATIENT)
Dept: FAMILY MEDICINE CLINIC | Facility: CLINIC | Age: 70
End: 2020-12-16

## 2020-12-16 DIAGNOSIS — E11.9 TYPE 2 DIABETES MELLITUS WITHOUT COMPLICATION, UNSPECIFIED WHETHER LONG TERM INSULIN USE (HCC): Primary | ICD-10-CM

## 2020-12-16 NOTE — TELEPHONE ENCOUNTER
From: Manual Lands  To: Jostin Vega MD  Sent: 12/16/2020 10:20 AM CST  Subject: Other    3 week A1c morning readings you requested: 11/25 thru 12/16:    [phone].

## 2020-12-16 NOTE — TELEPHONE ENCOUNTER
Ok, numbers for blood sugar noted, Hga1c may be around 7-7.5. Recommend rechecking hga1c in 3mths from last check. Cont. Log of blood sugar and see me in January with log.

## 2021-01-04 RX ORDER — METOPROLOL SUCCINATE 25 MG/1
12.5 TABLET, EXTENDED RELEASE ORAL
Qty: 45 TABLET | Refills: 1 | Status: SHIPPED | OUTPATIENT
Start: 2021-01-04 | End: 2021-03-22

## 2021-01-04 RX ORDER — TORSEMIDE 20 MG/1
20 TABLET ORAL DAILY
Qty: 90 TABLET | Refills: 1 | Status: SHIPPED | OUTPATIENT
Start: 2021-01-04 | End: 2021-03-10

## 2021-01-04 NOTE — TELEPHONE ENCOUNTER
Hypertension Medications Protocol [id number]/01/2021 03:41 PM   CMP or BMP in past 12 months Protocol Details    Last serum creatinine< 2.0     Appointment in past 6 or next 3 months      Requested Prescriptions     Pending Prescriptions Disp Refills   • tor

## 2021-01-04 NOTE — TELEPHONE ENCOUNTER
Hypertension Medications Protocol Lesfib4501/01/2021 03:41 PM   CMP or BMP in past 12 months Protocol Details    Last serum creatinine< 2.0     Appointment in past 6 or next 3 months      Refill protocol passed because the patient met the following protocol

## 2021-01-26 DIAGNOSIS — Z23 NEED FOR VACCINATION: ICD-10-CM

## 2021-02-01 ENCOUNTER — IMMUNIZATION (OUTPATIENT)
Dept: LAB | Age: 71
End: 2021-02-01
Attending: HOSPITALIST
Payer: MEDICARE

## 2021-02-01 ENCOUNTER — PATIENT MESSAGE (OUTPATIENT)
Dept: FAMILY MEDICINE CLINIC | Facility: CLINIC | Age: 71
End: 2021-02-01

## 2021-02-01 ENCOUNTER — TELEPHONE (OUTPATIENT)
Dept: FAMILY MEDICINE CLINIC | Facility: CLINIC | Age: 71
End: 2021-02-01

## 2021-02-01 DIAGNOSIS — Z23 NEED FOR VACCINATION: Primary | ICD-10-CM

## 2021-02-01 PROCEDURE — 0001A SARSCOV2 VAC 30MCG/0.3ML IM: CPT

## 2021-02-01 NOTE — TELEPHONE ENCOUNTER
Pt went to [facility] this AM to get her A1c labs done. She also thought she was supposed to get her Vitamin D checked as well but was told by the lab there was no order. I saw and communicated to pt there was a Vitamin D order in there since October.  Pt is

## 2021-02-01 NOTE — TELEPHONE ENCOUNTER
From: Porsche Ponce  To:  Saloni Torres MD  Sent: 2/1/2021 7:58 AM CST  Subject: Other    I went into [facility] this morning for a 7:30 appointment, they said they had the order for the A1c test but they did not have an order for a vitamin D test? I could not

## 2021-02-01 NOTE — TELEPHONE ENCOUNTER
LM informing pt that [facility] is unable to add Vitamin D to lab she had drawn this morning as they have to go into different kinds of tubes.  Informed pt that she does have an active Vitamin D order and she can have that drawn at [place]. Asked pt to call the of

## 2021-02-01 NOTE — TELEPHONE ENCOUNTER
Spoke with Leah at [address] [place] [address] to ask if Vitamin D can be added to the pt's A1C that was drawn this morning.  Leah states it cannot be added as the A1C goes in a purple top tube and the Vitamin D needs to go in a red top tube,

## 2021-02-02 LAB — HEMOGLOBIN A1C: 7.6 % OF TOTAL HGB

## 2021-02-05 DIAGNOSIS — E11.9 TYPE 2 DIABETES MELLITUS WITHOUT COMPLICATION, UNSPECIFIED WHETHER LONG TERM INSULIN USE (HCC): Primary | ICD-10-CM

## 2021-02-08 NOTE — TELEPHONE ENCOUNTER
Pravastatin Sodium 40 MG Oral Tab         Sig: TAKE 1 TABLET BY MOUTH NIGHTLY    Disp:  90 tablet    Refills:  1    Start: 2/5/2021    Class: Normal    Non-formulary    Last ordered: 5 months ago by Lashaun Drew MD     Cholesterol Medication Protocol F

## 2021-02-09 RX ORDER — PRAVASTATIN SODIUM 40 MG
TABLET ORAL
Qty: 90 TABLET | Refills: 1 | Status: SHIPPED | OUTPATIENT
Start: 2021-02-09 | End: 2021-03-10

## 2021-02-19 ENCOUNTER — PATIENT MESSAGE (OUTPATIENT)
Dept: FAMILY MEDICINE CLINIC | Facility: CLINIC | Age: 71
End: 2021-02-19

## 2021-02-19 NOTE — TELEPHONE ENCOUNTER
From: Dena Vargas  To:  Patricia Carrington MD  Sent: 2/19/2021 1:09 PM CST  Subject: Other    Blood sugar testing results for 3 weeks as you requested: 2/5 thru 2/18 A.M & P.M readings:    147 & 144 - 140 & 150 - 138 & 166 - 144 & 127 - 137 & 180 - 139 & 114

## 2021-02-22 ENCOUNTER — IMMUNIZATION (OUTPATIENT)
Dept: LAB | Age: 71
End: 2021-02-22
Attending: HOSPITALIST
Payer: MEDICARE

## 2021-02-22 DIAGNOSIS — Z23 NEED FOR VACCINATION: Primary | ICD-10-CM

## 2021-02-22 PROCEDURE — 0002A SARSCOV2 VAC 30MCG/0.3ML IM: CPT

## 2021-02-22 NOTE — TELEPHONE ENCOUNTER
Blood sugar log noted. Continue current dosage on Metformin. Watch S simple carbs and sugars in diet encourage exercise continue log of blood sugar daily and let me know in about 4 weeks.

## 2021-03-10 DIAGNOSIS — E11.9 TYPE 2 DIABETES MELLITUS WITHOUT COMPLICATION, UNSPECIFIED WHETHER LONG TERM INSULIN USE (HCC): ICD-10-CM

## 2021-03-10 RX ORDER — METOPROLOL SUCCINATE 25 MG/1
12.5 TABLET, EXTENDED RELEASE ORAL
Qty: 45 TABLET | Refills: 1 | Status: CANCELLED | OUTPATIENT
Start: 2021-03-10

## 2021-03-11 NOTE — TELEPHONE ENCOUNTER
No future appointments. LOV: 11/23/2020 with  for ear/jaw pain. Patient is due for a physical with . Please call patient to schedule an appt please.

## 2021-03-11 NOTE — TELEPHONE ENCOUNTER
Future Appointments   Date Time Provider Baldev Corona   3/22/2021 11:30 AM Mike Ulrich MD [facility] [address] [facility] [address]

## 2021-03-13 NOTE — TELEPHONE ENCOUNTER
torsemide 20 MG Oral Tab         Sig: Take 1 tablet (20 mg total) by mouth daily.     Disp:  90 tablet    Refills:  1    Start: 3/10/2021    Class: Normal    Non-formulary    Last ordered: 2 months ago by Chace Ferguson MD     Hypertension Medications Pro

## 2021-03-16 RX ORDER — PRAVASTATIN SODIUM 40 MG
TABLET ORAL
Qty: 90 TABLET | Refills: 1 | Status: SHIPPED | OUTPATIENT
Start: 2021-03-16 | End: 2021-03-22

## 2021-03-16 RX ORDER — TORSEMIDE 20 MG/1
20 TABLET ORAL DAILY
Qty: 90 TABLET | Refills: 1 | Status: SHIPPED | OUTPATIENT
Start: 2021-03-16 | End: 2021-07-30

## 2021-03-22 ENCOUNTER — OFFICE VISIT (OUTPATIENT)
Dept: FAMILY MEDICINE CLINIC | Facility: CLINIC | Age: 71
End: 2021-03-22
Payer: MEDICARE

## 2021-03-22 VITALS
WEIGHT: 268 LBS | DIASTOLIC BLOOD PRESSURE: 80 MMHG | SYSTOLIC BLOOD PRESSURE: 118 MMHG | HEART RATE: 65 BPM | BODY MASS INDEX: 45.2 KG/M2 | TEMPERATURE: 98 F | HEIGHT: 64.5 IN | RESPIRATION RATE: 16 BRPM

## 2021-03-22 DIAGNOSIS — E11.8 CONTROLLED DIABETES MELLITUS TYPE 2 WITH COMPLICATIONS, UNSPECIFIED WHETHER LONG TERM INSULIN USE (HCC): ICD-10-CM

## 2021-03-22 DIAGNOSIS — D69.6 THROMBOCYTOPENIA (HCC): ICD-10-CM

## 2021-03-22 DIAGNOSIS — E55.9 VITAMIN D DEFICIENCY: ICD-10-CM

## 2021-03-22 DIAGNOSIS — E11.9 TYPE 2 DIABETES MELLITUS WITHOUT COMPLICATION, UNSPECIFIED WHETHER LONG TERM INSULIN USE (HCC): ICD-10-CM

## 2021-03-22 DIAGNOSIS — I50.32 CHRONIC DIASTOLIC HEART FAILURE (HCC): ICD-10-CM

## 2021-03-22 DIAGNOSIS — N18.30 STAGE 3 CHRONIC KIDNEY DISEASE, UNSPECIFIED WHETHER STAGE 3A OR 3B CKD (HCC): ICD-10-CM

## 2021-03-22 DIAGNOSIS — E66.01 MORBID OBESITY (HCC): ICD-10-CM

## 2021-03-22 DIAGNOSIS — Z00.00 ROUTINE ADULT HEALTH MAINTENANCE: Primary | ICD-10-CM

## 2021-03-22 PROCEDURE — 3074F SYST BP LT 130 MM HG: CPT | Performed by: FAMILY MEDICINE

## 2021-03-22 PROCEDURE — 96160 PT-FOCUSED HLTH RISK ASSMT: CPT | Performed by: FAMILY MEDICINE

## 2021-03-22 PROCEDURE — G0439 PPPS, SUBSEQ VISIT: HCPCS | Performed by: FAMILY MEDICINE

## 2021-03-22 PROCEDURE — 3079F DIAST BP 80-89 MM HG: CPT | Performed by: FAMILY MEDICINE

## 2021-03-22 PROCEDURE — 99397 PER PM REEVAL EST PAT 65+ YR: CPT | Performed by: FAMILY MEDICINE

## 2021-03-22 PROCEDURE — 3008F BODY MASS INDEX DOCD: CPT | Performed by: FAMILY MEDICINE

## 2021-03-22 RX ORDER — METOPROLOL SUCCINATE 25 MG/1
12.5 TABLET, EXTENDED RELEASE ORAL
Qty: 45 TABLET | Refills: 1 | Status: SHIPPED | OUTPATIENT
Start: 2021-03-22 | End: 2021-07-30

## 2021-03-22 RX ORDER — PRAVASTATIN SODIUM 40 MG
TABLET ORAL
Qty: 90 TABLET | Refills: 1 | Status: SHIPPED | OUTPATIENT
Start: 2021-03-22 | End: 2021-04-13

## 2021-03-22 NOTE — PROGRESS NOTES
HPI:   Torsten Escobedo is a 79year old female who presents for a MA (Medicare Advantage) Supervisit (Once per calendar year). Patient feels well and has no complaints today.   Annual Physical due on 03/22/2022        Fall/Risk Assessment   She has been sc Hyponatremia     Acute renal failure, unspecified acute renal failure type ([address]     Hyperkalemia     ATN (acute tubular necrosis) (HCC)     H/O paroxysmal atrial tachycardia     Thrombocytopenia ([address]    Wt Readings from Last 3 Encounters:  03/22/21 : 268 history that includes colonoscopy (2005, 5/17); hysterectomy (2000); colonoscopy (N/A, 5/1/2017); cholecystectomy; colonoscopy (12/2018); and colonoscopy (N/A, 12/27/2018). Her family history includes Cancer in her sister; Diabetes in her mother.    SOCI symmetrical, trachea midline, no adenopathy;  thyroid: not enlarged, symmetric, no tenderness/mass/nodules; no carotid bruit or JVD   Back:   Symmetric, no curvature, ROM normal, no CVA tenderness   Lungs:   Clear to auscultation bilaterally, respirations unspecified whether long term insulin use (HCC)    Morbid obesity (HCC)    Thrombocytopenia (HCC)    Stage 3 chronic kidney disease, unspecified whether stage 3a or 3b CKD    Other orders  -     Metoprolol Succinate ER 25 MG Oral Tablet 24 Hr;  Take 0.5 tab separate sheet to patient  PREVENTATIVE SERVICES  INDICATIONS AND SCHEDULE Internal Lab or Procedure External Lab or Procedure   Diabetes Screening      HbgA1C   Annually Lab Results   Component Value Date    A1C 7.6 (H) 02/01/2021    No flowsheet data fou (Pneumovax)  Covered Once after 65 09/10/2018 Please get once after your 65th birthday    Hepatitis B for Moderate/High Risk No vaccine history found Medium/high risk factors:   End-stage renal disease   Hemophiliacs who received Factor VIII or IX concentr

## 2021-03-25 RX ORDER — METOPROLOL SUCCINATE 25 MG/1
12.5 TABLET, EXTENDED RELEASE ORAL
Qty: 45 TABLET | Refills: 1 | OUTPATIENT
Start: 2021-03-25

## 2021-03-25 NOTE — TELEPHONE ENCOUNTER
Requested Prescriptions     Metoprolol Succinate ER 25 MG Oral Tablet 24 Hr         Sig: Take 0.5 tablets (12.5 mg total) by mouth Daily Beta Blocker.     Disp:  45 tablet    Refills:  1    Start: 3/25/2021    Class: Normal    Non-formulary    Last ordered:

## 2021-03-29 ENCOUNTER — PATIENT MESSAGE (OUTPATIENT)
Dept: FAMILY MEDICINE CLINIC | Facility: CLINIC | Age: 71
End: 2021-03-29

## 2021-03-29 DIAGNOSIS — E11.9 TYPE 2 DIABETES MELLITUS WITHOUT COMPLICATION, UNSPECIFIED WHETHER LONG TERM INSULIN USE (HCC): ICD-10-CM

## 2021-03-29 NOTE — TELEPHONE ENCOUNTER
Patient calling, patient down to last pill. Requesting refill on Metformin, mail order will be coming on 4-4. Needs temp refill sent to local pharmacy.

## 2021-03-30 NOTE — TELEPHONE ENCOUNTER
From: Tiffany Kearney  To: Marisue Felty, MD  Sent: 3/29/2021 6:31 PM CDT  Subject: Prescription Question    I am to take Metformin twice a day, morning and night.  The prescription in My Chart reads one pill a day therefore [employer] only sends me a qty of 90

## 2021-03-31 ENCOUNTER — TELEPHONE (OUTPATIENT)
Dept: FAMILY MEDICINE CLINIC | Facility: CLINIC | Age: 71
End: 2021-03-31

## 2021-03-31 DIAGNOSIS — E11.9 TYPE 2 DIABETES MELLITUS WITHOUT COMPLICATION, UNSPECIFIED WHETHER LONG TERM INSULIN USE (HCC): ICD-10-CM

## 2021-03-31 NOTE — TELEPHONE ENCOUNTER
Spoke with pt, states her Metformin was increased to 500mg twice daily but the Rx sent to [facility] was only for #90. Resent Rx to [facility] and asked pt to let me know if there are any issues. Pt verbalized understanding and agreement. All questions answered.

## 2021-04-02 ENCOUNTER — TELEPHONE (OUTPATIENT)
Dept: FAMILY MEDICINE CLINIC | Facility: CLINIC | Age: 71
End: 2021-04-02

## 2021-04-02 DIAGNOSIS — E11.9 TYPE 2 DIABETES MELLITUS WITHOUT COMPLICATION, UNSPECIFIED WHETHER LONG TERM INSULIN USE (HCC): ICD-10-CM

## 2021-04-02 NOTE — TELEPHONE ENCOUNTER
Metformin resent to [facility] [employer] mail order. Patient advised.  Verbalized understanding

## 2021-04-02 NOTE — TELEPHONE ENCOUNTER
Patient states the metformin to [facility] [facility] mail order needs to be re-sent, they say they don't have it and they are trying to reach the office, please advise.

## 2021-04-03 ENCOUNTER — PATIENT MESSAGE (OUTPATIENT)
Dept: FAMILY MEDICINE CLINIC | Facility: CLINIC | Age: 71
End: 2021-04-03

## 2021-04-05 NOTE — TELEPHONE ENCOUNTER
From: Bryan Almazan  To: Boom Sena MD  Sent: 4/3/2021 4:06 PM CDT  Subject: Other    I was sent a jury duty notice and I need a letter from you on your letterhead or prescription pad in order to be excused.  If you could send me one either thru my verona

## 2021-04-12 ENCOUNTER — PATIENT MESSAGE (OUTPATIENT)
Dept: FAMILY MEDICINE CLINIC | Facility: CLINIC | Age: 71
End: 2021-04-12

## 2021-04-13 DIAGNOSIS — E11.9 TYPE 2 DIABETES MELLITUS WITHOUT COMPLICATION, UNSPECIFIED WHETHER LONG TERM INSULIN USE (HCC): ICD-10-CM

## 2021-04-13 NOTE — TELEPHONE ENCOUNTER
From: Carlyn Nicolas  Sent: 2/19/2019 6:31 PM CST  To: [facility] Clinical Staff  Subject: RE: Other    ----- Message from [address] [place] [address] [address] Generic sent at 2/19/2019 6:31 PM CST -----    For some reason I had marked that Dr. Shaila White said my A1C was due in July, I guess t 2+ bilaterally,/right normal/left normal

## 2021-04-13 NOTE — TELEPHONE ENCOUNTER
From: Sanjuanita Nair  To: Justino Taylor MD  Sent: 4/12/2021 4:04 PM CDT  Subject: Other    [facility],  Can you tell me how I can get that message the  sent?      Thank You

## 2021-04-14 NOTE — TELEPHONE ENCOUNTER
Cholesterol Medication Protocol [id number]/13/2021 06:56 PM   Lipid panel within past 12 months Protocol Details    ALT < 80     ALT resulted within past year     Appointment within past 12 or next 3 month      Refill protocol failed because the patient did

## 2021-04-15 RX ORDER — PRAVASTATIN SODIUM 40 MG
TABLET ORAL
Qty: 90 TABLET | Refills: 1 | Status: SHIPPED | OUTPATIENT
Start: 2021-04-15 | End: 2021-10-06

## 2021-04-26 ENCOUNTER — PATIENT OUTREACH (OUTPATIENT)
Dept: CASE MANAGEMENT | Age: 71
End: 2021-04-26

## 2021-05-03 ENCOUNTER — TELEPHONE (OUTPATIENT)
Dept: FAMILY MEDICINE CLINIC | Facility: CLINIC | Age: 71
End: 2021-05-03

## 2021-05-03 NOTE — TELEPHONE ENCOUNTER
Diabetic eye exam report received on 04/12/201 by Dr. Akilah Landaverde. No diabetic retinopathy noted. Diabetic flowsheet updated and report placed in Dr. Kamari Pride insket to sign and send to scan.

## 2021-05-24 ENCOUNTER — PATIENT MESSAGE (OUTPATIENT)
Dept: FAMILY MEDICINE CLINIC | Facility: CLINIC | Age: 71
End: 2021-05-24

## 2021-05-24 NOTE — TELEPHONE ENCOUNTER
From: Roberto Tapia  To: Columbus Rubinstein, MD  Sent: 5/24/2021 9:34 AM CDT  Subject: Other    I will be going over to [facility] in [facility] for my A1c blood work in June.  Will you please make sure they have the order for my test, and also an order for my vi

## 2021-06-02 DIAGNOSIS — E11.9 TYPE 2 DIABETES MELLITUS WITHOUT COMPLICATION, UNSPECIFIED WHETHER LONG TERM INSULIN USE (HCC): ICD-10-CM

## 2021-06-02 RX ORDER — PRAVASTATIN SODIUM 40 MG
TABLET ORAL
Qty: 90 TABLET | Refills: 1 | Status: CANCELLED | OUTPATIENT
Start: 2021-06-02

## 2021-06-02 RX ORDER — METOPROLOL SUCCINATE 25 MG/1
12.5 TABLET, EXTENDED RELEASE ORAL
Qty: 45 TABLET | Refills: 1 | Status: CANCELLED | OUTPATIENT
Start: 2021-06-02

## 2021-06-03 NOTE — TELEPHONE ENCOUNTER
Requested Prescriptions     Metoprolol Succinate ER 25 MG Oral Tablet 24 Hr         Sig: Take 0.5 tablets (12.5 mg total) by mouth Daily Beta Blocker.     Disp:  45 tablet    Refills:  1    Start: 6/2/2021    Class: Normal    Non-formulary        metFORMIN

## 2021-07-30 DIAGNOSIS — E11.9 TYPE 2 DIABETES MELLITUS WITHOUT COMPLICATION, UNSPECIFIED WHETHER LONG TERM INSULIN USE (HCC): ICD-10-CM

## 2021-07-30 RX ORDER — PRAVASTATIN SODIUM 40 MG
TABLET ORAL
Qty: 90 TABLET | Refills: 1 | Status: CANCELLED | OUTPATIENT
Start: 2021-07-30

## 2021-07-30 RX ORDER — METOPROLOL SUCCINATE 25 MG/1
12.5 TABLET, EXTENDED RELEASE ORAL
Qty: 45 TABLET | Refills: 1 | Status: SHIPPED | OUTPATIENT
Start: 2021-07-30

## 2021-07-30 RX ORDER — TORSEMIDE 20 MG/1
20 TABLET ORAL DAILY
Qty: 90 TABLET | Refills: 1 | Status: SHIPPED | OUTPATIENT
Start: 2021-07-30 | End: 2021-10-06

## 2021-07-30 NOTE — TELEPHONE ENCOUNTER
Requested Prescriptions     torsemide 20 MG Oral Tab         Sig: Take 1 tablet (20 mg total) by mouth daily.     Disp:  90 tablet    Refills:  1    Start: 7/30/2021    Class: Normal    Non-formulary    Last ordered: 4 months ago by Gerhardt Guile, MD

## 2021-08-16 ENCOUNTER — HOSPITAL ENCOUNTER (OUTPATIENT)
Dept: MAMMOGRAPHY | Age: 71
Discharge: HOME | End: 2021-08-16
Attending: FAMILY MEDICINE

## 2021-08-16 DIAGNOSIS — Z12.31 ENCOUNTER FOR SCREENING MAMMOGRAM FOR MALIGNANT NEOPLASM OF BREAST: ICD-10-CM

## 2021-08-16 PROCEDURE — 77063 BREAST TOMOSYNTHESIS BI: CPT

## 2021-08-18 DIAGNOSIS — E11.9 TYPE 2 DIABETES MELLITUS WITHOUT COMPLICATION, UNSPECIFIED WHETHER LONG TERM INSULIN USE (HCC): ICD-10-CM

## 2021-08-18 NOTE — TELEPHONE ENCOUNTER
Requested Prescriptions     Name from pharmacy: [address] [place] [address],[address]         Will file in chart as: METFORMIN  MG Oral Tab    Sig: TAKE 1 TABLET TWICE DAILY WITH MEALS    Disp:  180 tablet    Refills:  1 (Pharmacy requested: Not spe

## 2021-08-19 ENCOUNTER — PATIENT MESSAGE (OUTPATIENT)
Dept: FAMILY MEDICINE CLINIC | Facility: CLINIC | Age: 71
End: 2021-08-19

## 2021-08-19 DIAGNOSIS — E11.9 TYPE 2 DIABETES MELLITUS WITHOUT COMPLICATION, UNSPECIFIED WHETHER LONG TERM INSULIN USE (HCC): ICD-10-CM

## 2021-08-19 NOTE — TELEPHONE ENCOUNTER
Medication Quantity Refills Start End   metFORMIN HCl 500 MG Oral Tab 180 tablet 1 4/2/2021    Sig:   Take 1 tablet (500 mg total) by mouth 2 (two) times daily with meals.        Diabetic Medication Protocol Zbijes2908/19/2021 03:33 PM   Microalbumin procedur

## 2021-08-19 NOTE — TELEPHONE ENCOUNTER
From: Archie Miller  To: Olya Lamb MD  Sent: 8/19/2021 2:23 PM CDT  Subject: Prescription Question    I received a denial request to refill Metformin from [facility]. Can you check and see if they have a request for a refill on Metformin?       Nany Boyd

## 2021-08-20 DIAGNOSIS — E11.9 TYPE 2 DIABETES MELLITUS WITHOUT COMPLICATION, UNSPECIFIED WHETHER LONG TERM INSULIN USE (HCC): ICD-10-CM

## 2021-08-20 NOTE — TELEPHONE ENCOUNTER
Requested Prescriptions     metFORMIN HCl 500 MG Oral Tab          Possible duplicate: Jarek to review recent actions on this medication    Sig: Take 1 tablet (500 mg total) by mouth 2 (two) times daily with meals.     Disp:  180 tablet    Refills:  0    St

## 2021-08-27 ENCOUNTER — TELEPHONE (OUTPATIENT)
Dept: FAMILY MEDICINE CLINIC | Facility: CLINIC | Age: 71
End: 2021-08-27

## 2021-08-27 NOTE — TELEPHONE ENCOUNTER
Spoke to pt, informed of normal mammogram results. Pt was notified by [facility] of results. Advised to repeat in 1 year. Pt verbalized understanding and agreement. All questions answered.

## 2021-08-27 NOTE — TELEPHONE ENCOUNTER
Report from [facility],[address] for mammogram received. Mammogram noted from 8/16/2021 within normal limits. Repeat in 1 year.

## 2021-09-13 PROCEDURE — 3051F HG A1C>EQUAL 7.0%<8.0%: CPT | Performed by: FAMILY MEDICINE

## 2021-09-20 ENCOUNTER — OFFICE VISIT (OUTPATIENT)
Dept: FAMILY MEDICINE CLINIC | Facility: CLINIC | Age: 71
End: 2021-09-20
Payer: MEDICARE

## 2021-09-20 VITALS
DIASTOLIC BLOOD PRESSURE: 72 MMHG | BODY MASS INDEX: 46.4 KG/M2 | HEART RATE: 74 BPM | OXYGEN SATURATION: 98 % | WEIGHT: 275.13 LBS | TEMPERATURE: 97 F | HEIGHT: 64.5 IN | RESPIRATION RATE: 16 BRPM | SYSTOLIC BLOOD PRESSURE: 124 MMHG

## 2021-09-20 DIAGNOSIS — Z23 NEED FOR VACCINATION: ICD-10-CM

## 2021-09-20 DIAGNOSIS — E78.5 DYSLIPIDEMIA: ICD-10-CM

## 2021-09-20 DIAGNOSIS — E78.00 PURE HYPERCHOLESTEROLEMIA: ICD-10-CM

## 2021-09-20 DIAGNOSIS — E11.9 TYPE 2 DIABETES MELLITUS WITHOUT COMPLICATION, UNSPECIFIED WHETHER LONG TERM INSULIN USE (HCC): Primary | ICD-10-CM

## 2021-09-20 PROCEDURE — 3074F SYST BP LT 130 MM HG: CPT | Performed by: FAMILY MEDICINE

## 2021-09-20 PROCEDURE — G0008 ADMIN INFLUENZA VIRUS VAC: HCPCS | Performed by: FAMILY MEDICINE

## 2021-09-20 PROCEDURE — 3078F DIAST BP <80 MM HG: CPT | Performed by: FAMILY MEDICINE

## 2021-09-20 PROCEDURE — 99214 OFFICE O/P EST MOD 30 MIN: CPT | Performed by: FAMILY MEDICINE

## 2021-09-20 PROCEDURE — 90662 IIV NO PRSV INCREASED AG IM: CPT | Performed by: FAMILY MEDICINE

## 2021-09-20 PROCEDURE — 3008F BODY MASS INDEX DOCD: CPT | Performed by: FAMILY MEDICINE

## 2021-09-20 NOTE — PROGRESS NOTES
HPI:   Angelina Reddy is a 70year old female that presents for follow-up of diabetes high cholesterol high blood pressure. Patient is doing well with no complaints.     Past medical, surgical, family and social history reviewed in detail with patient and u Subjective   Above-noted.    Subjective   No new respiratory complaints reported.  Review of Systems   Patient denies any pain.  Objective     Objective   Visit Vitals  /75   Pulse 75   Temp 99.1 °F (37.3 °C) (Oral)   Resp 17   Ht 5' 9\" (1.753 m)   Wt 84.7 kg (186 lb 11.7 oz)   SpO2 97%   BMI 27.58 kg/m²     Oxygen flowing at 2 L/min    Physical Exam   Chronically ill-appearing 87-year-old man whose breathing was not labored. Conjunctiva pink and sclera anicteric.  No JVD.  Mild rhonchi.  Heart rhythm regular.  Abdomen soft and nontender.  No clubbing, cyanosis or edema.    I/O's       Intake/Output Summary (Last 24 hours) at 11/30/2020 1414  Last data filed at 11/30/2020 1200  Gross per 24 hour   Intake 200.07 ml   Output 1600 ml   Net -1399.93 ml         Labs     Recent Labs   Lab 11/30/20  0530 11/29/20  0440 11/28/20  0446   WBC 7.9 7.4 8.0   HCT 36.9* 40.0 38.3*   HGB 11.6* 12.5* 12.2*    181 176     Recent Labs   Lab 11/30/20  0530 11/29/20  0440 11/28/20  0446   SODIUM 140 141 140   POTASSIUM 4.1 4.0 3.9   CHLORIDE 108* 108* 105   CO2 26 29 30   GLUCOSE 81 86 90   BUN 28* 23* 25*   CREATININE 1.22* 1.03 1.17         Imaging     XR CHEST PA OR AP 1 VIEW   Final Result   1.  Persistent left basilar infiltrate and trace left effusion.      Electronically Signed by: MIGUEL MANZANO M.D.    Signed on: 11/29/2020 8:21 AM          XR CHEST PA OR AP 1 VIEW   Final Result   Opacities at left lung base are again noted, but with some interval improved aeration since prior exam.      Electronically Signed by: JENNIFER RECIO M.D.    Signed on: 11/26/2020 9:46 AM          XR FOOT MIN 3 VIEWS RIGHT   Final Result   1. Diffuse degenerative changes without evidence of acute osseous injury.      Electronically Signed by: ALIZA SUN M.D.    Signed on: 11/22/2020 10:53 PM          XR CHEST PA OR AP 1 VIEW   Final Result   1.  Left lower lobe infiltrate and small effusion.      Electronically Signed by:  ALIZA SUN M.D.    Signed on: 11/22/2020 9:53 PM          CT HEAD WO CONTRAST   Final Result   1.    No acute intracranial abnormalities unchanged dating back to 09/01/2020.      Electronically Signed by: ALIZA SUN M.D.    Signed on: 11/22/2020 9:09 PM                Diagnosis     Pneumonia due to infectious organism, unspecified laterality, unspecified part of lung.  History of stroke.  High risk for aspiration.    UTI (urinary tract infection), uncomplicated   SARS-CoV-2 not detected.  Plan     keep O2 saturation between 92% and 95%.  Aspiration precautions.  Swallow eval noted.    Empiric antibiotics as ordered.  Plan for 7 days total.  Renal function will be monitored.    DVT Prophylaxis    Inpatient Medications     Current Facility-Administered Medications   Medication   • sodium chloride 0.45 % with KCl 20 mEq infusion   • enoxaparin (LOVENOX) injection 40 mg   • gabapentin (NEURONTIN) capsule 600 mg   • isosorbide mononitrate (IMDUR) ER tablet 30 mg   • aspirin (ECOTRIN) enteric coated tablet 81 mg   • losartan (COZAAR) tablet 25 mg   • metoPROLOL tartrate (LOPRESSOR) tablet 25 mg   • pantoprazole (PROTONIX) EC tablet 40 mg   • acetaminophen (TYLENOL) tablet 650 mg   • HYDROcodone-acetaminophen (NORCO) 7.5-325 MG per tablet 1 tablet   • ipratropium-albuterol (DUONEB) 0.5-2.5 (3) MG/3ML nebulizer solution 3 mL     Charting performed by raúl Nails for Dr. Marlon Carrera.     All medical record entries made by the scribe were at my direction. I have reviewed the chart and agree that the record accurately reflects my personal performance of the history, physical exam, hospital course, and assessment and plan.         kg/m²  Estimated body mass index is 46.5 kg/m² as calculated from the following:    Height as of this encounter: 5' 4.5\" (1.638 m). Weight as of this encounter: 275 lb 2 oz (124.8 kg). Vital signs reviewed. Appears stated age, well groomed.   Physical Dyslipidemia    Hemoglobin A1c is 7.4 and stable from previous which was 7.4. Goal is 7.0 or less if possible with avoiding hypoglycemia. Patient has gained 20 pounds over the past year and I encouraged her to proper diet and exercise.   We will continue

## 2021-09-23 ENCOUNTER — PATIENT MESSAGE (OUTPATIENT)
Dept: FAMILY MEDICINE CLINIC | Facility: CLINIC | Age: 71
End: 2021-09-23

## 2021-09-24 NOTE — TELEPHONE ENCOUNTER
From: Pam Hughes  To: Evi Zacarias MD  Sent: 9/23/2021 6:15 PM CDT  Subject: Vaccine    Can you tell me how I can schedule a appointment for the third covid shot?  I had received my first 2 shots at the [facility] and wou;d I be able to ge

## 2021-10-06 DIAGNOSIS — E11.9 TYPE 2 DIABETES MELLITUS WITHOUT COMPLICATION, UNSPECIFIED WHETHER LONG TERM INSULIN USE (HCC): ICD-10-CM

## 2021-10-07 RX ORDER — PRAVASTATIN SODIUM 40 MG
TABLET ORAL
Qty: 90 TABLET | Refills: 0 | Status: SHIPPED | OUTPATIENT
Start: 2021-10-07 | End: 2022-01-12

## 2021-10-07 RX ORDER — TORSEMIDE 20 MG/1
20 TABLET ORAL DAILY
Qty: 90 TABLET | Refills: 0 | Status: SHIPPED | OUTPATIENT
Start: 2021-10-07

## 2021-10-07 NOTE — TELEPHONE ENCOUNTER
Requested Prescriptions     pravastatin 40 MG Oral Tab         Sig: TAKE 1 TABLET BY MOUTH NIGHTLY    Disp:  90 tablet    Refills:  1    Start: 10/6/2021    Class: Normal    Non-formulary For: Type 2 diabetes mellitus without complication, unspecified whet

## 2021-10-15 DIAGNOSIS — E11.9 TYPE 2 DIABETES MELLITUS WITHOUT COMPLICATION, UNSPECIFIED WHETHER LONG TERM INSULIN USE (HCC): ICD-10-CM

## 2021-10-15 NOTE — TELEPHONE ENCOUNTER
Requested Prescriptions     metFORMIN 500 MG Oral Tab         Sig: Take 1 tablet (500 mg total) by mouth 2 (two) times daily with meals.     Disp:  180 tablet    Refills:  0    Start: 10/15/2021    Class: Normal    Non-formulary For: Type 2 diabetes melonie

## 2021-10-28 DIAGNOSIS — E11.9 TYPE 2 DIABETES MELLITUS WITHOUT COMPLICATION, UNSPECIFIED WHETHER LONG TERM INSULIN USE (HCC): ICD-10-CM

## 2021-10-28 NOTE — TELEPHONE ENCOUNTER
Diabetic Medication Protocol Failed 10/28/2021 08:54 AM   Protocol Details  Microalbumin procedure in past 12 months or taking ACE/ARB    HgBA1C procedure resulted in past 6 months    Last HgBA1C < 7.5    Appointment in past 6 or next 3 months     Refill p

## 2021-12-09 NOTE — TELEPHONE ENCOUNTER
From: Leah Maloney  To: Carmelina Marx MD  Sent: 8/4/2018 11:31 AM CDT  Subject: Test Results Question    I have a appt. with Dr. Ismael Nickerson on 9/10 at 9:00 am …. . to discuss my test results. Can you put my test report from [address] [place] [address] on 7/31 on my chart?    Than
Stable

## 2021-12-13 DIAGNOSIS — E11.9 TYPE 2 DIABETES MELLITUS WITHOUT COMPLICATION, UNSPECIFIED WHETHER LONG TERM INSULIN USE (HCC): ICD-10-CM

## 2021-12-15 NOTE — TELEPHONE ENCOUNTER
Diabetic Medication Protocol Failed 12/13/2021 04:44 PM   Protocol Details  Microalbumin procedure in past 12 months or taking ACE/ARB    HgBA1C procedure resulted in past 6 months    Last HgBA1C < 7.5    Appointment in past 6 or next 3 months     Refill p

## 2022-01-12 DIAGNOSIS — E11.9 TYPE 2 DIABETES MELLITUS WITHOUT COMPLICATION, UNSPECIFIED WHETHER LONG TERM INSULIN USE (HCC): ICD-10-CM

## 2022-01-13 RX ORDER — PRAVASTATIN SODIUM 40 MG
TABLET ORAL
Qty: 90 TABLET | Refills: 1 | Status: SHIPPED | OUTPATIENT
Start: 2022-01-13

## 2022-01-13 NOTE — TELEPHONE ENCOUNTER
Cholesterol Medication Protocol Passed 01/12/2022 04:57 PM   Protocol Details  ALT < 80    ALT resulted within past year    Lipid panel within past 12 months    Appointment within past 12 or next 3 months     Refill protocol passed because the patient met

## 2022-01-29 ENCOUNTER — PATIENT MESSAGE (OUTPATIENT)
Dept: CASE MANAGEMENT | Age: 72
End: 2022-01-29

## 2022-01-29 DIAGNOSIS — E11.8 CONTROLLED DIABETES MELLITUS TYPE 2 WITH COMPLICATIONS, UNSPECIFIED WHETHER LONG TERM INSULIN USE (HCC): Primary | ICD-10-CM

## 2022-01-31 ENCOUNTER — PATIENT MESSAGE (OUTPATIENT)
Dept: CASE MANAGEMENT | Age: 72
End: 2022-01-31

## 2022-01-31 RX ORDER — BLOOD-GLUCOSE METER
1 EACH MISCELLANEOUS 2 TIMES DAILY
Qty: 1 KIT | Refills: 0 | Status: SHIPPED | OUTPATIENT
Start: 2022-01-31 | End: 2023-01-31

## 2022-01-31 RX ORDER — BLOOD SUGAR DIAGNOSTIC
1 STRIP MISCELLANEOUS 2 TIMES DAILY
Qty: 200 STRIP | Refills: 0 | Status: SHIPPED | OUTPATIENT
Start: 2022-01-31

## 2022-01-31 RX ORDER — LANCETS
1 EACH MISCELLANEOUS 2 TIMES DAILY
Qty: 200 EACH | Refills: 0 | Status: SHIPPED | OUTPATIENT
Start: 2022-01-31

## 2022-02-07 NOTE — TELEPHONE ENCOUNTER
Patient requesting prescription for diabetic testing supplies-[employer] will cover Accu-chek Guide. Needs glucometer,test strips and lancets.

## 2022-02-08 ENCOUNTER — PATIENT MESSAGE (OUTPATIENT)
Dept: CASE MANAGEMENT | Age: 72
End: 2022-02-08

## 2022-02-08 RX ORDER — BLOOD-GLUCOSE METER
1 EACH MISCELLANEOUS 2 TIMES DAILY
Qty: 1 KIT | Refills: 0 | Status: SHIPPED | OUTPATIENT
Start: 2022-02-08

## 2022-02-08 RX ORDER — BLOOD SUGAR DIAGNOSTIC
1 STRIP MISCELLANEOUS 2 TIMES DAILY
Qty: 200 STRIP | Refills: 1 | Status: SHIPPED | OUTPATIENT
Start: 2022-02-08

## 2022-02-09 RX ORDER — BLOOD SUGAR DIAGNOSTIC
STRIP MISCELLANEOUS
Qty: 100 EACH | Refills: 0 | Status: SHIPPED | OUTPATIENT
Start: 2022-02-09

## 2022-02-09 RX ORDER — GLUCOSAM/CHON-MSM1/C/MANG/BOSW 500-416.6
1 TABLET ORAL 2 TIMES DAILY
Qty: 100 EACH | Refills: 0 | Status: SHIPPED | OUTPATIENT
Start: 2022-02-09 | End: 2023-02-09

## 2022-02-09 RX ORDER — DIPHENHYDRAMINE HCL 25 MG
1 TABLET ORAL 2 TIMES DAILY
Qty: 1 KIT | Refills: 0 | Status: SHIPPED | OUTPATIENT
Start: 2022-02-09

## 2022-02-16 ENCOUNTER — TELEPHONE (OUTPATIENT)
Dept: FAMILY MEDICINE CLINIC | Facility: CLINIC | Age: 72
End: 2022-02-16

## 2022-02-16 DIAGNOSIS — E11.9 TYPE 2 DIABETES MELLITUS WITHOUT COMPLICATION, UNSPECIFIED WHETHER LONG TERM INSULIN USE (HCC): ICD-10-CM

## 2022-02-16 DIAGNOSIS — I10 ESSENTIAL HYPERTENSION: ICD-10-CM

## 2022-02-16 DIAGNOSIS — Z00.00 LABORATORY EXAMINATION ORDERED AS PART OF A ROUTINE GENERAL MEDICAL EXAMINATION: Primary | ICD-10-CM

## 2022-02-16 DIAGNOSIS — R79.89 LOW VITAMIN D LEVEL: ICD-10-CM

## 2022-02-16 DIAGNOSIS — E78.5 DYSLIPIDEMIA: ICD-10-CM

## 2022-02-16 NOTE — TELEPHONE ENCOUNTER
Dr.Dongre- Beltran has scheduled her Medicare supervisit and would like A1C and any other labs needed to be done before appointment.   Lab orders to go to [facility].    Future Appointments   Date Time Provider Baldev Corona   3/7/2022  9:20 AM Robyn Christie MD [facility] [address] [facility]

## 2022-02-28 PROCEDURE — 3060F POS MICROALBUMINURIA REV: CPT | Performed by: FAMILY MEDICINE

## 2022-02-28 PROCEDURE — 3052F HG A1C>EQUAL 8.0%<EQUAL 9.0%: CPT | Performed by: FAMILY MEDICINE

## 2022-02-28 PROCEDURE — 3061F NEG MICROALBUMINURIA REV: CPT | Performed by: FAMILY MEDICINE

## 2022-03-01 LAB
ABSOLUTE BASOPHILS: 30 CELLS/UL (ref 0–200)
ABSOLUTE EOSINOPHILS: 122 CELLS/UL (ref 15–500)
ABSOLUTE LYMPHOCYTES: 2219 CELLS/UL (ref 850–3900)
ABSOLUTE MONOCYTES: 547 CELLS/UL (ref 200–950)
ABSOLUTE NEUTROPHILS: 4682 CELLS/UL (ref 1500–7800)
ALBUMIN/GLOBULIN RATIO: 1.4 (CALC) (ref 1–2.5)
ALBUMIN: 3.8 G/DL (ref 3.6–5.1)
ALKALINE PHOSPHATASE: 54 U/L (ref 37–153)
ALT: 14 U/L (ref 6–29)
AST: 12 U/L (ref 10–35)
BASOPHILS: 0.4 %
BILIRUBIN, TOTAL: 0.4 MG/DL (ref 0.2–1.2)
BUN/CREATININE RATIO: 22 (CALC) (ref 6–22)
BUN: 28 MG/DL (ref 7–25)
CALCIUM: 9.4 MG/DL (ref 8.6–10.4)
CARBON DIOXIDE: 28 MMOL/L (ref 20–32)
CHLORIDE: 101 MMOL/L (ref 98–110)
CHOL/HDLC RATIO: 3.5 (CALC)
CHOLESTEROL, TOTAL: 154 MG/DL
CREATININE, RANDOM URINE: 40 MG/DL (ref 20–275)
CREATININE: 1.26 MG/DL (ref 0.6–0.93)
EGFR IF AFRICN AM: 50 ML/MIN/1.73M2
EGFR IF NONAFRICN AM: 43 ML/MIN/1.73M2
EOSINOPHILS: 1.6 %
GLOBULIN: 2.7 G/DL (CALC) (ref 1.9–3.7)
GLUCOSE: 184 MG/DL (ref 65–99)
HDL CHOLESTEROL: 44 MG/DL
HEMATOCRIT: 38.4 % (ref 35–45)
HEMOGLOBIN A1C: 8.3 % OF TOTAL HGB
HEMOGLOBIN: 12.5 G/DL (ref 11.7–15.5)
LDL-CHOLESTEROL: 79 MG/DL (CALC)
LYMPHOCYTES: 29.2 %
MCH: 29.2 PG (ref 27–33)
MCHC: 32.6 G/DL (ref 32–36)
MCV: 89.7 FL (ref 80–100)
MICROALBUMIN/CREATININE RATIO, RANDOM URINE: 33 MCG/MG CREAT
MICROALBUMIN: 1.3 MG/DL
MONOCYTES: 7.2 %
MPV: 11.3 FL (ref 7.5–12.5)
NEUTROPHILS: 61.6 %
NON-HDL CHOLESTEROL: 110 MG/DL (CALC)
PLATELET COUNT: 238 THOUSAND/UL (ref 140–400)
POTASSIUM: 4.8 MMOL/L (ref 3.5–5.3)
PROTEIN, TOTAL: 6.5 G/DL (ref 6.1–8.1)
RDW: 13 % (ref 11–15)
RED BLOOD CELL COUNT: 4.28 MILLION/UL (ref 3.8–5.1)
SODIUM: 139 MMOL/L (ref 135–146)
T4, FREE: 1.1 NG/DL (ref 0.8–1.8)
TRIGLYCERIDES: 217 MG/DL
TSH: 3.25 MIU/L (ref 0.4–4.5)
VITAMIN D, 25-OH, TOTAL: 17 NG/ML (ref 30–100)
WHITE BLOOD CELL COUNT: 7.6 THOUSAND/UL (ref 3.8–10.8)

## 2022-03-07 ENCOUNTER — OFFICE VISIT (OUTPATIENT)
Dept: FAMILY MEDICINE CLINIC | Facility: CLINIC | Age: 72
End: 2022-03-07
Payer: MEDICARE

## 2022-03-07 VITALS
HEART RATE: 82 BPM | HEIGHT: 64.5 IN | RESPIRATION RATE: 16 BRPM | TEMPERATURE: 98 F | SYSTOLIC BLOOD PRESSURE: 132 MMHG | DIASTOLIC BLOOD PRESSURE: 70 MMHG | BODY MASS INDEX: 47 KG/M2 | OXYGEN SATURATION: 98 %

## 2022-03-07 DIAGNOSIS — I10 ESSENTIAL HYPERTENSION: ICD-10-CM

## 2022-03-07 DIAGNOSIS — D69.6 THROMBOCYTOPENIA (HCC): ICD-10-CM

## 2022-03-07 DIAGNOSIS — Z00.00 ROUTINE ADULT HEALTH MAINTENANCE: Primary | ICD-10-CM

## 2022-03-07 DIAGNOSIS — E66.01 MORBID OBESITY (HCC): ICD-10-CM

## 2022-03-07 DIAGNOSIS — E55.9 VITAMIN D DEFICIENCY: ICD-10-CM

## 2022-03-07 DIAGNOSIS — E78.00 PURE HYPERCHOLESTEROLEMIA: ICD-10-CM

## 2022-03-07 DIAGNOSIS — R45.4 IRRITABILITY AND ANGER: ICD-10-CM

## 2022-03-07 DIAGNOSIS — E11.65 UNCONTROLLED TYPE 2 DIABETES MELLITUS WITH HYPERGLYCEMIA (HCC): ICD-10-CM

## 2022-03-07 DIAGNOSIS — Z86.79 H/O PAROXYSMAL ATRIAL TACHYCARDIA: ICD-10-CM

## 2022-03-07 DIAGNOSIS — N18.30 STAGE 3 CHRONIC KIDNEY DISEASE, UNSPECIFIED WHETHER STAGE 3A OR 3B CKD (HCC): ICD-10-CM

## 2022-03-07 PROBLEM — N17.9 AKI (ACUTE KIDNEY INJURY) (HCC): Status: RESOLVED | Noted: 2019-12-12 | Resolved: 2022-03-07

## 2022-03-07 PROBLEM — N17.9 ACUTE RENAL FAILURE, UNSPECIFIED ACUTE RENAL FAILURE TYPE (HCC): Status: RESOLVED | Noted: 2019-12-26 | Resolved: 2022-03-07

## 2022-03-07 PROBLEM — N17.9 AKI (ACUTE KIDNEY INJURY): Status: RESOLVED | Noted: 2019-12-12 | Resolved: 2022-03-07

## 2022-03-07 PROBLEM — I50.9 ACUTE CONGESTIVE HEART FAILURE (HCC): Status: RESOLVED | Noted: 2019-12-03 | Resolved: 2022-03-07

## 2022-03-07 PROBLEM — E87.5 HYPERKALEMIA: Status: RESOLVED | Noted: 2019-12-26 | Resolved: 2022-03-07

## 2022-03-07 PROBLEM — N17.9 ACUTE RENAL FAILURE, UNSPECIFIED ACUTE RENAL FAILURE TYPE: Status: RESOLVED | Noted: 2019-12-26 | Resolved: 2022-03-07

## 2022-03-07 PROBLEM — E78.5 DYSLIPIDEMIA: Status: RESOLVED | Noted: 2019-12-12 | Resolved: 2022-03-07

## 2022-03-07 PROBLEM — I50.31 ACUTE DIASTOLIC HEART FAILURE (HCC): Status: RESOLVED | Noted: 2019-12-03 | Resolved: 2022-03-07

## 2022-03-07 PROBLEM — E87.1 HYPONATREMIA: Status: RESOLVED | Noted: 2019-12-26 | Resolved: 2022-03-07

## 2022-03-07 PROCEDURE — 99397 PER PM REEVAL EST PAT 65+ YR: CPT | Performed by: FAMILY MEDICINE

## 2022-03-07 PROCEDURE — 3078F DIAST BP <80 MM HG: CPT | Performed by: FAMILY MEDICINE

## 2022-03-07 PROCEDURE — 96160 PT-FOCUSED HLTH RISK ASSMT: CPT | Performed by: FAMILY MEDICINE

## 2022-03-07 PROCEDURE — 3075F SYST BP GE 130 - 139MM HG: CPT | Performed by: FAMILY MEDICINE

## 2022-03-07 PROCEDURE — G0439 PPPS, SUBSEQ VISIT: HCPCS | Performed by: FAMILY MEDICINE

## 2022-03-10 RX ORDER — PRAVASTATIN SODIUM 40 MG
TABLET ORAL
Qty: 90 TABLET | Refills: 1 | Status: CANCELLED | OUTPATIENT
Start: 2022-03-10

## 2022-03-30 RX ORDER — PEN NEEDLE, DIABETIC 31 GX5/16"
NEEDLE, DISPOSABLE MISCELLANEOUS
Qty: 200 EACH | Refills: 3 | Status: SHIPPED | OUTPATIENT
Start: 2022-03-30

## 2022-03-30 RX ORDER — PRAVASTATIN SODIUM 40 MG
TABLET ORAL
Qty: 90 TABLET | Refills: 1 | Status: SHIPPED | OUTPATIENT
Start: 2022-03-30

## 2022-03-30 RX ORDER — TORSEMIDE 20 MG/1
20 TABLET ORAL DAILY
Qty: 90 TABLET | Refills: 1 | Status: SHIPPED | OUTPATIENT
Start: 2022-03-30

## 2022-04-11 ENCOUNTER — OFFICE VISIT (OUTPATIENT)
Dept: FAMILY MEDICINE CLINIC | Facility: CLINIC | Age: 72
End: 2022-04-11
Payer: MEDICARE

## 2022-04-11 VITALS
TEMPERATURE: 98 F | HEART RATE: 82 BPM | OXYGEN SATURATION: 98 % | WEIGHT: 270.13 LBS | SYSTOLIC BLOOD PRESSURE: 110 MMHG | HEIGHT: 64.5 IN | BODY MASS INDEX: 45.56 KG/M2 | RESPIRATION RATE: 18 BRPM | DIASTOLIC BLOOD PRESSURE: 70 MMHG

## 2022-04-11 DIAGNOSIS — E11.8 CONTROLLED TYPE 2 DIABETES MELLITUS WITH COMPLICATION, WITHOUT LONG-TERM CURRENT USE OF INSULIN (HCC): Primary | ICD-10-CM

## 2022-04-11 PROCEDURE — 3008F BODY MASS INDEX DOCD: CPT | Performed by: FAMILY MEDICINE

## 2022-04-11 PROCEDURE — 3074F SYST BP LT 130 MM HG: CPT | Performed by: FAMILY MEDICINE

## 2022-04-11 PROCEDURE — 99214 OFFICE O/P EST MOD 30 MIN: CPT | Performed by: FAMILY MEDICINE

## 2022-04-11 PROCEDURE — 3078F DIAST BP <80 MM HG: CPT | Performed by: FAMILY MEDICINE

## 2022-04-19 ENCOUNTER — PATIENT MESSAGE (OUTPATIENT)
Dept: CASE MANAGEMENT | Age: 72
End: 2022-04-19

## 2022-04-19 NOTE — TELEPHONE ENCOUNTER
From: Bia Huerta  To: Leah CORONADO  Sent: 4/19/2022 9:39 AM CDT  Subject: perscription refill    My metformin dosage has been increased by Dr. Luz Cuevas, please update the dosage on metformin in my chart to reflect 2000 dosage (qty. 2- 500 mg in the morning and qty, 2- 500 in the evening) for a total of 2000 mg. When I place an order to refill metformin with [address], they will get the correct quantity sent to me.  thank you Jon Ba

## 2022-06-13 RX ORDER — METOPROLOL SUCCINATE 25 MG/1
TABLET, EXTENDED RELEASE ORAL
Qty: 45 TABLET | Refills: 1 | Status: SHIPPED | OUTPATIENT
Start: 2022-06-13

## 2022-06-13 RX ORDER — TORSEMIDE 20 MG/1
TABLET ORAL
Qty: 90 TABLET | Refills: 1 | Status: SHIPPED | OUTPATIENT
Start: 2022-06-13

## 2022-06-14 LAB
ALBUMIN/GLOBULIN RATIO: 1.4 (CALC) (ref 1–2.5)
ALBUMIN: 4 G/DL (ref 3.6–5.1)
ALKALINE PHOSPHATASE: 51 U/L (ref 37–153)
ALT: 20 U/L (ref 6–29)
AST: 14 U/L (ref 10–35)
BILIRUBIN, TOTAL: 0.4 MG/DL (ref 0.2–1.2)
BUN/CREATININE RATIO: 20 (CALC) (ref 6–22)
BUN: 26 MG/DL (ref 7–25)
CALCIUM: 9.3 MG/DL (ref 8.6–10.4)
CARBON DIOXIDE: 31 MMOL/L (ref 20–32)
CHLORIDE: 100 MMOL/L (ref 98–110)
CREATININE: 1.28 MG/DL (ref 0.6–0.93)
EGFR IF AFRICN AM: 49 ML/MIN/1.73M2
EGFR IF NONAFRICN AM: 42 ML/MIN/1.73M2
GLOBULIN: 2.8 G/DL (CALC) (ref 1.9–3.7)
GLUCOSE: 167 MG/DL (ref 65–99)
HEMOGLOBIN A1C: 7.5 % OF TOTAL HGB
POTASSIUM: 4.2 MMOL/L (ref 3.5–5.3)
PROTEIN, TOTAL: 6.8 G/DL (ref 6.1–8.1)
SODIUM: 142 MMOL/L (ref 135–146)
VITAMIN D, 25-OH, TOTAL: 67 NG/ML (ref 30–100)

## 2022-07-11 PROCEDURE — 3061F NEG MICROALBUMINURIA REV: CPT | Performed by: FAMILY MEDICINE

## 2022-09-12 ENCOUNTER — HOSPITAL ENCOUNTER (OUTPATIENT)
Dept: MAMMOGRAPHY | Age: 72
Discharge: HOME | End: 2022-09-12
Attending: FAMILY MEDICINE

## 2022-09-12 DIAGNOSIS — Z12.31 ENCOUNTER FOR SCREENING MAMMOGRAM FOR MALIGNANT NEOPLASM OF BREAST: ICD-10-CM

## 2022-09-12 PROCEDURE — 77063 BREAST TOMOSYNTHESIS BI: CPT

## 2022-09-14 ENCOUNTER — TELEPHONE (OUTPATIENT)
Dept: FAMILY MEDICINE CLINIC | Facility: CLINIC | Age: 72
End: 2022-09-14

## 2022-09-14 NOTE — TELEPHONE ENCOUNTER
Dr. Danii Epps received mammogram report from [address] [place] [address]. Per Dr. Danii Epps mammogram is normal, to repeat in 1 year. [employer] message sent to patient informing her of above. Mammogram abstracted. HM updated.

## 2022-09-16 ENCOUNTER — PATIENT MESSAGE (OUTPATIENT)
Dept: FAMILY MEDICINE CLINIC | Facility: CLINIC | Age: 72
End: 2022-09-16

## 2022-09-16 DIAGNOSIS — E11.8 CONTROLLED TYPE 2 DIABETES MELLITUS WITH COMPLICATION, WITHOUT LONG-TERM CURRENT USE OF INSULIN (HCC): Primary | ICD-10-CM

## 2022-09-16 NOTE — TELEPHONE ENCOUNTER
See [employer] message below:    Last a1c done 6/13/22    Last OV 4/11/22 - when do you want her to follow up? Any other labs you want her to complete?

## 2022-09-16 NOTE — TELEPHONE ENCOUNTER
From: Kailey Baez  To: Fabienne Márquez MD  Sent: 9/16/2022 9:24 AM CDT  Subject: Test for A1c    Do I need a A1c test? I believe it is 3 months since the last one. If I need a test to be done, can you please send a script to [facility] for me to have the test done.   Thanks,  Sukumar Collins

## 2022-09-21 DIAGNOSIS — E11.8 CONTROLLED DIABETES MELLITUS TYPE 2 WITH COMPLICATIONS, UNSPECIFIED WHETHER LONG TERM INSULIN USE (HCC): ICD-10-CM

## 2022-09-21 DIAGNOSIS — E11.9 TYPE 2 DIABETES MELLITUS WITHOUT COMPLICATION, UNSPECIFIED WHETHER LONG TERM INSULIN USE (HCC): ICD-10-CM

## 2022-09-21 RX ORDER — BLOOD SUGAR DIAGNOSTIC
STRIP MISCELLANEOUS
Qty: 200 STRIP | Refills: 0 | Status: SHIPPED | OUTPATIENT
Start: 2022-09-21

## 2022-09-21 RX ORDER — LANCETS
EACH MISCELLANEOUS
Qty: 200 EACH | Refills: 0 | Status: SHIPPED | OUTPATIENT
Start: 2022-09-21

## 2022-09-21 RX ORDER — PRAVASTATIN SODIUM 40 MG
TABLET ORAL
Qty: 90 TABLET | Refills: 0 | Status: SHIPPED | OUTPATIENT
Start: 2022-09-21

## 2022-09-26 PROCEDURE — 3051F HG A1C>EQUAL 7.0%<8.0%: CPT | Performed by: FAMILY MEDICINE

## 2022-09-27 LAB
ALBUMIN/GLOBULIN RATIO: 1.6 (CALC) (ref 1–2.5)
ALBUMIN: 4.1 G/DL (ref 3.6–5.1)
ALKALINE PHOSPHATASE: 50 U/L (ref 37–153)
ALT: 22 U/L (ref 6–29)
AST: 14 U/L (ref 10–35)
BILIRUBIN, TOTAL: 0.4 MG/DL (ref 0.2–1.2)
BUN/CREATININE RATIO: 26 (CALC) (ref 6–22)
BUN: 34 MG/DL (ref 7–25)
CALCIUM: 9.4 MG/DL (ref 8.6–10.4)
CARBON DIOXIDE: 31 MMOL/L (ref 20–32)
CHLORIDE: 99 MMOL/L (ref 98–110)
CHOL/HDLC RATIO: 3.3 (CALC)
CHOLESTEROL, TOTAL: 129 MG/DL
CREATININE: 1.29 MG/DL (ref 0.6–1)
EGFR: 44 ML/MIN/1.73M2
GLOBULIN: 2.5 G/DL (CALC) (ref 1.9–3.7)
GLUCOSE: 161 MG/DL (ref 65–99)
HDL CHOLESTEROL: 39 MG/DL
HEMOGLOBIN A1C: 7.5 % OF TOTAL HGB
LDL-CHOLESTEROL: 64 MG/DL (CALC)
NON-HDL CHOLESTEROL: 90 MG/DL (CALC)
POTASSIUM: 4.2 MMOL/L (ref 3.5–5.3)
PROTEIN, TOTAL: 6.6 G/DL (ref 6.1–8.1)
SODIUM: 141 MMOL/L (ref 135–146)
TRIGLYCERIDES: 185 MG/DL

## 2022-10-03 ENCOUNTER — TELEPHONE (OUTPATIENT)
Dept: FAMILY MEDICINE CLINIC | Facility: CLINIC | Age: 72
End: 2022-10-03

## 2022-10-03 ENCOUNTER — VIRTUAL PHONE E/M (OUTPATIENT)
Dept: FAMILY MEDICINE CLINIC | Facility: CLINIC | Age: 72
End: 2022-10-03
Payer: MEDICARE

## 2022-10-03 DIAGNOSIS — U07.1 COVID-19 VIRUS INFECTION: Primary | ICD-10-CM

## 2022-10-03 RX ORDER — CODEINE PHOSPHATE AND GUAIFENESIN 10; 100 MG/5ML; MG/5ML
5 SOLUTION ORAL DAILY PRN
Qty: 70 ML | Refills: 0 | Status: SHIPPED | OUTPATIENT
Start: 2022-10-03

## 2022-10-03 NOTE — TELEPHONE ENCOUNTER
Pharmacist calling with questions in regards to prescription for Paxlovid. Needs date of onset of symptoms and date of positive test, and renal dose-quantity does not match instruction.

## 2022-10-03 NOTE — TELEPHONE ENCOUNTER
Pt tested positive for covid on Friday. She would like to know if there is something stronger than Robitussin that she can take for her cough.

## 2022-10-04 RX ORDER — NIRMATRELVIR AND RITONAVIR 150-100 MG
KIT ORAL
Qty: 20 TABLET | Refills: 0 | Status: SHIPPED | OUTPATIENT
Start: 2022-10-04 | End: 2022-10-09

## 2022-10-17 ENCOUNTER — OFFICE VISIT (OUTPATIENT)
Dept: FAMILY MEDICINE CLINIC | Facility: CLINIC | Age: 72
End: 2022-10-17
Payer: MEDICARE

## 2022-10-17 VITALS
DIASTOLIC BLOOD PRESSURE: 70 MMHG | RESPIRATION RATE: 16 BRPM | TEMPERATURE: 97 F | HEIGHT: 64.5 IN | HEART RATE: 102 BPM | OXYGEN SATURATION: 98 % | BODY MASS INDEX: 44.52 KG/M2 | SYSTOLIC BLOOD PRESSURE: 124 MMHG | WEIGHT: 264 LBS

## 2022-10-17 DIAGNOSIS — E78.00 PURE HYPERCHOLESTEROLEMIA: ICD-10-CM

## 2022-10-17 DIAGNOSIS — E66.01 MORBID OBESITY (HCC): ICD-10-CM

## 2022-10-17 DIAGNOSIS — I10 ESSENTIAL HYPERTENSION: ICD-10-CM

## 2022-10-17 DIAGNOSIS — E11.8 CONTROLLED TYPE 2 DIABETES MELLITUS WITH COMPLICATION, WITHOUT LONG-TERM CURRENT USE OF INSULIN (HCC): Primary | ICD-10-CM

## 2022-10-17 PROCEDURE — 99214 OFFICE O/P EST MOD 30 MIN: CPT | Performed by: FAMILY MEDICINE

## 2022-10-17 PROCEDURE — 3074F SYST BP LT 130 MM HG: CPT | Performed by: FAMILY MEDICINE

## 2022-10-17 PROCEDURE — 90662 IIV NO PRSV INCREASED AG IM: CPT | Performed by: FAMILY MEDICINE

## 2022-10-17 PROCEDURE — 3078F DIAST BP <80 MM HG: CPT | Performed by: FAMILY MEDICINE

## 2022-10-17 PROCEDURE — 3008F BODY MASS INDEX DOCD: CPT | Performed by: FAMILY MEDICINE

## 2022-10-17 PROCEDURE — G0008 ADMIN INFLUENZA VIRUS VAC: HCPCS | Performed by: FAMILY MEDICINE

## 2022-11-03 ENCOUNTER — PATIENT MESSAGE (OUTPATIENT)
Dept: FAMILY MEDICINE CLINIC | Facility: CLINIC | Age: 72
End: 2022-11-03

## 2022-11-03 NOTE — TELEPHONE ENCOUNTER
From: Lakeshia Rashid  To: Arsh Alatorre MD  Sent: 11/3/2022 3:58 PM CDT  Subject: covid booster sandhya    Received my covid booster shot on Oct. 29,2022 from [facility].  All up to date on all the covid shots

## 2022-11-04 ENCOUNTER — PATIENT MESSAGE (OUTPATIENT)
Dept: FAMILY MEDICINE CLINIC | Facility: CLINIC | Age: 72
End: 2022-11-04

## 2022-11-04 RX ORDER — GLUCOSAM/CHON-MSM1/C/MANG/BOSW 500-416.6
1 TABLET ORAL 2 TIMES DAILY
Qty: 200 EACH | Refills: 1 | Status: SHIPPED | OUTPATIENT
Start: 2022-11-04 | End: 2023-11-04

## 2022-11-04 NOTE — TELEPHONE ENCOUNTER
From: Adrianna Prasad  To: Kelly Moses MD  Sent: 11/3/2022 3:58 PM CDT  Subject: covid booster sandhya    Received my covid booster shot on Oct. 29,2022 from [facility].  All up to date on all the covid shots

## 2023-01-12 ENCOUNTER — TELEPHONE (OUTPATIENT)
Dept: FAMILY MEDICINE CLINIC | Facility: CLINIC | Age: 73
End: 2023-01-12

## 2023-01-12 NOTE — TELEPHONE ENCOUNTER
Recd fax from Vasile Finnegan on 12-13-22 for provider to sign \"NIV Renewal Letter of Medical Necessity\". Form signed and faxed to April at [phone]. Fax was returned to office as the most recent office visit notes are needed. Faxed office visit notes from 10/17/22 to [address] [place] [address] at [phone]. Copy of fax sent to scan and copy placed in blue accordion folder at .

## 2023-01-13 RX ORDER — METOPROLOL SUCCINATE 25 MG/1
TABLET, EXTENDED RELEASE ORAL
Qty: 45 TABLET | Refills: 1 | Status: SHIPPED | OUTPATIENT
Start: 2023-01-13

## 2023-01-30 ENCOUNTER — TELEPHONE (OUTPATIENT)
Dept: FAMILY MEDICINE CLINIC | Facility: CLINIC | Age: 73
End: 2023-01-30

## 2023-01-30 NOTE — TELEPHONE ENCOUNTER
Pt called and said she has a UTI and she would like something called in to [facility] in [address] for the UTI.

## 2023-01-30 NOTE — TELEPHONE ENCOUNTER
Pt does not have a car today. Will try cranberry juice and Azo. Pt will either call back or go to [facility].

## 2023-02-03 ENCOUNTER — OFFICE VISIT (OUTPATIENT)
Dept: FAMILY MEDICINE CLINIC | Facility: CLINIC | Age: 73
End: 2023-02-03
Payer: MEDICARE

## 2023-02-03 VITALS
WEIGHT: 260 LBS | HEART RATE: 79 BPM | RESPIRATION RATE: 16 BRPM | SYSTOLIC BLOOD PRESSURE: 134 MMHG | OXYGEN SATURATION: 99 % | HEIGHT: 64.5 IN | BODY MASS INDEX: 43.85 KG/M2 | TEMPERATURE: 97 F | DIASTOLIC BLOOD PRESSURE: 70 MMHG

## 2023-02-03 DIAGNOSIS — M25.551 HIP PAIN, ACUTE, RIGHT: Primary | ICD-10-CM

## 2023-02-07 ENCOUNTER — PATIENT MESSAGE (OUTPATIENT)
Dept: FAMILY MEDICINE CLINIC | Facility: CLINIC | Age: 73
End: 2023-02-07

## 2023-02-07 DIAGNOSIS — E78.00 ELEVATED LDL CHOLESTEROL LEVEL: ICD-10-CM

## 2023-02-07 DIAGNOSIS — I10 ESSENTIAL HYPERTENSION: ICD-10-CM

## 2023-02-07 DIAGNOSIS — E55.9 VITAMIN D DEFICIENCY: ICD-10-CM

## 2023-02-07 DIAGNOSIS — E11.8 CONTROLLED TYPE 2 DIABETES MELLITUS WITH COMPLICATION, WITHOUT LONG-TERM CURRENT USE OF INSULIN (HCC): Primary | ICD-10-CM

## 2023-02-07 DIAGNOSIS — E03.9 HYPOTHYROIDISM, UNSPECIFIED TYPE: ICD-10-CM

## 2023-02-08 NOTE — TELEPHONE ENCOUNTER
From: David Sweeney  To: Garret Joe MD  Sent: 2/7/2023 6:36 PM CST  Subject: Blood Work     Am I suppose to have my blood work done for my A1c this month? If I need it done can you please send a script to [facility] so I can have the test done.   Thank Florentin Raymond

## 2023-02-21 LAB
ABSOLUTE BASOPHILS: 43 CELLS/UL (ref 0–200)
ABSOLUTE EOSINOPHILS: 77 CELLS/UL (ref 15–500)
ABSOLUTE LYMPHOCYTES: 2546 CELLS/UL (ref 850–3900)
ABSOLUTE MONOCYTES: 619 CELLS/UL (ref 200–950)
ABSOLUTE NEUTROPHILS: 5315 CELLS/UL (ref 1500–7800)
ALBUMIN/GLOBULIN RATIO: 1.6 (CALC) (ref 1–2.5)
ALBUMIN: 4.2 G/DL (ref 3.6–5.1)
ALKALINE PHOSPHATASE: 51 U/L (ref 37–153)
ALT: 15 U/L (ref 6–29)
AST: 13 U/L (ref 10–35)
BASOPHILS: 0.5 %
BILIRUBIN, TOTAL: 0.3 MG/DL (ref 0.2–1.2)
BUN/CREATININE RATIO: 26 (CALC) (ref 6–22)
BUN: 30 MG/DL (ref 7–25)
CALCIUM: 9.6 MG/DL (ref 8.6–10.4)
CARBON DIOXIDE: 28 MMOL/L (ref 20–32)
CHLORIDE: 99 MMOL/L (ref 98–110)
CHOL/HDLC RATIO: 3.4 (CALC)
CHOLESTEROL, TOTAL: 146 MG/DL
CREATININE, RANDOM URINE: 19 MG/DL (ref 20–275)
CREATININE: 1.17 MG/DL (ref 0.6–1)
EGFR: 50 ML/MIN/1.73M2
EOSINOPHILS: 0.9 %
GLOBULIN: 2.6 G/DL (CALC) (ref 1.9–3.7)
GLUCOSE: 182 MG/DL (ref 65–99)
HDL CHOLESTEROL: 43 MG/DL
HEMATOCRIT: 38.9 % (ref 35–45)
HEMOGLOBIN A1C: 7.9 % OF TOTAL HGB
HEMOGLOBIN: 12.6 G/DL (ref 11.7–15.5)
LDL-CHOLESTEROL: 70 MG/DL (CALC)
LYMPHOCYTES: 29.6 %
MCH: 29.2 PG (ref 27–33)
MCHC: 32.4 G/DL (ref 32–36)
MCV: 90.3 FL (ref 80–100)
MICROALBUMIN/CREATININE RATIO, RANDOM URINE: 89 MCG/MG CREAT
MICROALBUMIN: 1.7 MG/DL
MONOCYTES: 7.2 %
MPV: 11 FL (ref 7.5–12.5)
NEUTROPHILS: 61.8 %
NON-HDL CHOLESTEROL: 103 MG/DL (CALC)
PLATELET COUNT: 278 THOUSAND/UL (ref 140–400)
POTASSIUM: 4.3 MMOL/L (ref 3.5–5.3)
PROTEIN, TOTAL: 6.8 G/DL (ref 6.1–8.1)
RDW: 13 % (ref 11–15)
RED BLOOD CELL COUNT: 4.31 MILLION/UL (ref 3.8–5.1)
SODIUM: 140 MMOL/L (ref 135–146)
TRIGLYCERIDES: 241 MG/DL
TSH W/REFLEX TO FT4: 3.79 MIU/L (ref 0.4–4.5)
VITAMIN D, 25-OH, TOTAL: 48 NG/ML (ref 30–100)
WHITE BLOOD CELL COUNT: 8.6 THOUSAND/UL (ref 3.8–10.8)

## 2023-03-13 ENCOUNTER — OFFICE VISIT (OUTPATIENT)
Dept: FAMILY MEDICINE CLINIC | Facility: CLINIC | Age: 73
End: 2023-03-13
Payer: MEDICARE

## 2023-03-13 VITALS
HEIGHT: 64.5 IN | WEIGHT: 262.25 LBS | BODY MASS INDEX: 44.23 KG/M2 | RESPIRATION RATE: 18 BRPM | OXYGEN SATURATION: 98 % | SYSTOLIC BLOOD PRESSURE: 130 MMHG | DIASTOLIC BLOOD PRESSURE: 76 MMHG | HEART RATE: 64 BPM | TEMPERATURE: 97 F

## 2023-03-13 DIAGNOSIS — E11.8 CONTROLLED TYPE 2 DIABETES MELLITUS WITH COMPLICATION, WITHOUT LONG-TERM CURRENT USE OF INSULIN (HCC): ICD-10-CM

## 2023-03-13 DIAGNOSIS — Z86.79 H/O PAROXYSMAL ATRIAL TACHYCARDIA: ICD-10-CM

## 2023-03-13 DIAGNOSIS — E78.00 PURE HYPERCHOLESTEROLEMIA: ICD-10-CM

## 2023-03-13 DIAGNOSIS — Z00.00 ROUTINE ADULT HEALTH MAINTENANCE: Primary | ICD-10-CM

## 2023-03-13 DIAGNOSIS — N18.31 STAGE 3A CHRONIC KIDNEY DISEASE (HCC): Chronic | ICD-10-CM

## 2023-03-13 DIAGNOSIS — I10 ESSENTIAL HYPERTENSION: ICD-10-CM

## 2023-03-13 DIAGNOSIS — E66.01 MORBID OBESITY (HCC): ICD-10-CM

## 2023-03-13 DIAGNOSIS — E55.9 VITAMIN D DEFICIENCY: ICD-10-CM

## 2023-03-13 PROBLEM — R45.4 IRRITABILITY AND ANGER: Status: RESOLVED | Noted: 2017-10-20 | Resolved: 2023-03-13

## 2023-03-13 PROBLEM — D69.6 THROMBOCYTOPENIA (HCC): Chronic | Status: RESOLVED | Noted: 2020-07-29 | Resolved: 2023-03-13

## 2023-03-13 PROBLEM — D69.6 THROMBOCYTOPENIA: Chronic | Status: RESOLVED | Noted: 2020-07-29 | Resolved: 2023-03-13

## 2023-03-25 DIAGNOSIS — E11.9 TYPE 2 DIABETES MELLITUS WITHOUT COMPLICATION, UNSPECIFIED WHETHER LONG TERM INSULIN USE (HCC): ICD-10-CM

## 2023-03-29 RX ORDER — PRAVASTATIN SODIUM 40 MG
TABLET ORAL
Qty: 90 TABLET | Refills: 1 | Status: SHIPPED | OUTPATIENT
Start: 2023-03-29

## 2023-03-29 RX ORDER — TORSEMIDE 20 MG/1
TABLET ORAL
Qty: 90 TABLET | Refills: 1 | Status: SHIPPED | OUTPATIENT
Start: 2023-03-29

## 2023-04-15 ENCOUNTER — PATIENT MESSAGE (OUTPATIENT)
Dept: FAMILY MEDICINE CLINIC | Facility: CLINIC | Age: 73
End: 2023-04-15

## 2023-04-17 NOTE — TELEPHONE ENCOUNTER
From: Guillermina Hilario  To: Devon Barrientos MD  Sent: 4/15/2023 3:32 PM CDT  Subject: vision test    These are the results from my appointment to get my eyes dialated/ please let me know if you receive them.        thank you

## 2023-05-15 RX ORDER — LANCETS
EACH MISCELLANEOUS
Qty: 204 EACH | Refills: 0 | Status: SHIPPED | OUTPATIENT
Start: 2023-05-15

## 2023-06-03 ENCOUNTER — PATIENT MESSAGE (OUTPATIENT)
Dept: FAMILY MEDICINE CLINIC | Facility: CLINIC | Age: 73
End: 2023-06-03

## 2023-06-05 ENCOUNTER — TELEPHONE (OUTPATIENT)
Dept: FAMILY MEDICINE CLINIC | Facility: CLINIC | Age: 73
End: 2023-06-05

## 2023-06-05 DIAGNOSIS — E11.9 TYPE 2 DIABETES MELLITUS WITHOUT COMPLICATION, UNSPECIFIED WHETHER LONG TERM INSULIN USE (HCC): Primary | ICD-10-CM

## 2023-06-05 NOTE — TELEPHONE ENCOUNTER
Pt states she is going to [facility] to get labs done 6/16/23. She believes Dr. Joseluis Treviño wanted an A1C and a urine. Please place orders to [facility] for Pt.

## 2023-06-05 NOTE — TELEPHONE ENCOUNTER
Yes, I have ordered CMP, lipid, hemoglobin A1c, urine microalbumin at [facility]. Please do blood work fasting. Follow-up with me after.

## 2023-06-05 NOTE — TELEPHONE ENCOUNTER
Pt called the office. Spoke to Pt and explained her appt had been rescheduled through a wait list offer via [address] Pt denies doing this. Was able to get Pt appt put back to 7/10/23 at 1p. Pt was taken care of and does not need anything else.

## 2023-06-13 DIAGNOSIS — E11.8 CONTROLLED DIABETES MELLITUS TYPE 2 WITH COMPLICATIONS, UNSPECIFIED WHETHER LONG TERM INSULIN USE (HCC): ICD-10-CM

## 2023-06-14 RX ORDER — BLOOD-GLUCOSE METER
EACH MISCELLANEOUS
Qty: 1 KIT | Refills: 0 | Status: SHIPPED | OUTPATIENT
Start: 2023-06-14

## 2023-06-14 NOTE — TELEPHONE ENCOUNTER
Name from pharmacy: [place] [address] w/Device  Kit         Will file in chart as: [address] w/Device Does not apply Kit    Sig: USE AS DIRECTED    Disp: 1 kit    Refills: 0 (Pharmacy requested: Not specified)    Start: 6/13/2023    Class: Normal    Non-formulary For: Controlled diabetes mellitus type 2 with complications, unspecified whether long term insulin use ([address]    Last ordered: 1 year ago by Joelle Muñoz MD Last refill: 2/10/2022    Rx #: [id number]    Diabetic Supplies Protocol Lfykfg9006/13/2023 11:54 AM    Appointment in the past 12 or next 3 months      To be filled at: [facility], [address] [place] [address] [phone], [phone]

## 2023-06-17 ENCOUNTER — TELEPHONE (OUTPATIENT)
Dept: FAMILY MEDICINE CLINIC | Facility: CLINIC | Age: 73
End: 2023-06-17

## 2023-06-17 NOTE — TELEPHONE ENCOUNTER
Received 2nd request from [employer] to fill out forms for ventilator and supplies. Per Dr. Teressa Todd this needs to be filed out by pts pulmonologist, first request was sent back to them stating above. Will fax back to them again.

## 2023-06-19 PROCEDURE — 3060F POS MICROALBUMINURIA REV: CPT | Performed by: FAMILY MEDICINE

## 2023-06-19 PROCEDURE — 3061F NEG MICROALBUMINURIA REV: CPT | Performed by: FAMILY MEDICINE

## 2023-06-19 PROCEDURE — 3051F HG A1C>EQUAL 7.0%<8.0%: CPT | Performed by: FAMILY MEDICINE

## 2023-06-20 LAB
ALBUMIN/GLOBULIN RATIO: 1.6 (CALC) (ref 1–2.5)
ALBUMIN: 4.2 G/DL (ref 3.6–5.1)
ALKALINE PHOSPHATASE: 49 U/L (ref 37–153)
ALT: 19 U/L (ref 6–29)
AST: 14 U/L (ref 10–35)
BILIRUBIN, TOTAL: 0.4 MG/DL (ref 0.2–1.2)
BUN/CREATININE RATIO: 24 (CALC) (ref 6–22)
BUN: 28 MG/DL (ref 7–25)
CALCIUM: 9.5 MG/DL (ref 8.6–10.4)
CARBON DIOXIDE: 31 MMOL/L (ref 20–32)
CHLORIDE: 101 MMOL/L (ref 98–110)
CHOL/HDLC RATIO: 3.5 (CALC)
CHOLESTEROL, TOTAL: 148 MG/DL
CREATININE, RANDOM URINE: 12 MG/DL (ref 20–275)
CREATININE: 1.17 MG/DL (ref 0.6–1)
EGFR: 50 ML/MIN/1.73M2
GLOBULIN: 2.7 G/DL (CALC) (ref 1.9–3.7)
GLUCOSE: 171 MG/DL (ref 65–99)
HDL CHOLESTEROL: 42 MG/DL
HEMOGLOBIN A1C: 7.2 % OF TOTAL HGB
LDL-CHOLESTEROL: 76 MG/DL (CALC)
MICROALBUMIN/CREATININE RATIO, RANDOM URINE: 167 MCG/MG CREAT
MICROALBUMIN: 2 MG/DL
NON-HDL CHOLESTEROL: 106 MG/DL (CALC)
POTASSIUM: 4.6 MMOL/L (ref 3.5–5.3)
PROTEIN, TOTAL: 6.9 G/DL (ref 6.1–8.1)
SODIUM: 142 MMOL/L (ref 135–146)
TRIGLYCERIDES: 208 MG/DL

## 2023-07-10 ENCOUNTER — OFFICE VISIT (OUTPATIENT)
Dept: FAMILY MEDICINE CLINIC | Facility: CLINIC | Age: 73
End: 2023-07-10
Payer: MEDICARE

## 2023-07-10 VITALS
HEART RATE: 86 BPM | DIASTOLIC BLOOD PRESSURE: 62 MMHG | WEIGHT: 258 LBS | HEIGHT: 64.5 IN | OXYGEN SATURATION: 91 % | RESPIRATION RATE: 14 BRPM | SYSTOLIC BLOOD PRESSURE: 122 MMHG | TEMPERATURE: 98 F | BODY MASS INDEX: 43.51 KG/M2

## 2023-07-10 DIAGNOSIS — E11.8 CONTROLLED TYPE 2 DIABETES MELLITUS WITH COMPLICATION, WITHOUT LONG-TERM CURRENT USE OF INSULIN (HCC): Primary | ICD-10-CM

## 2023-07-10 DIAGNOSIS — Z12.11 SCREENING FOR COLON CANCER: ICD-10-CM

## 2023-07-10 DIAGNOSIS — R80.9 MICROALBUMINURIA: ICD-10-CM

## 2023-07-10 DIAGNOSIS — Z12.31 SCREENING MAMMOGRAM FOR BREAST CANCER: ICD-10-CM

## 2023-07-10 PROCEDURE — 3078F DIAST BP <80 MM HG: CPT | Performed by: FAMILY MEDICINE

## 2023-07-10 PROCEDURE — 1159F MED LIST DOCD IN RCRD: CPT | Performed by: FAMILY MEDICINE

## 2023-07-10 PROCEDURE — 99214 OFFICE O/P EST MOD 30 MIN: CPT | Performed by: FAMILY MEDICINE

## 2023-07-10 PROCEDURE — 3074F SYST BP LT 130 MM HG: CPT | Performed by: FAMILY MEDICINE

## 2023-07-10 PROCEDURE — 3008F BODY MASS INDEX DOCD: CPT | Performed by: FAMILY MEDICINE

## 2023-07-10 RX ORDER — LISINOPRIL 10 MG/1
10 TABLET ORAL DAILY
Qty: 90 TABLET | Refills: 0 | Status: SHIPPED | OUTPATIENT
Start: 2023-07-10 | End: 2024-07-04

## 2023-07-10 RX ORDER — LISINOPRIL 10 MG/1
10 TABLET ORAL DAILY
Qty: 90 TABLET | Refills: 0 | Status: CANCELLED | OUTPATIENT
Start: 2023-07-10 | End: 2024-07-04

## 2023-07-17 DIAGNOSIS — E11.8 CONTROLLED DIABETES MELLITUS TYPE 2 WITH COMPLICATIONS, UNSPECIFIED WHETHER LONG TERM INSULIN USE (HCC): ICD-10-CM

## 2023-07-17 RX ORDER — LANCETS
1 EACH MISCELLANEOUS 2 TIMES DAILY
Qty: 200 EACH | Refills: 0 | Status: SHIPPED | OUTPATIENT
Start: 2023-07-17

## 2023-07-17 RX ORDER — ISOPROPYL ALCOHOL 70 ML/100ML
SWAB TOPICAL
Qty: 200 EACH | Refills: 0 | Status: SHIPPED | OUTPATIENT
Start: 2023-07-17

## 2023-07-17 RX ORDER — BLOOD SUGAR DIAGNOSTIC
STRIP MISCELLANEOUS
Qty: 200 STRIP | Refills: 0 | Status: SHIPPED | OUTPATIENT
Start: 2023-07-17

## 2023-07-17 NOTE — TELEPHONE ENCOUNTER
Name from pharmacy: [address]         Will file in chart as: [address]: TEST BLOOD SUGAR TWICE DAILY    Disp: 200 strip    Refills: 0 (Pharmacy requested: Not specified)    Start: 7/17/2023    Class: Normal    Non-formulary For: Controlled diabetes mellitus type 2 with complications, unspecified whether long term insulin use ([address]    Last ordered: 4 months ago by Missael Amos MD Last refill: 5/5/2023    Rx #: [id number]    Diabetic Supplies Protocol Naafgj8707/17/2023 01:58 AM    Appointment in the past 12 or next 3 months       Name from pharmacy: [address] [place] [address]         Will file in chart as: [facility] Does not apply Misc    Sig: TEST BLOOD [facility] [place]: 200 each    Refills: 0 (Pharmacy requested: Not specified)    Start: 7/17/2023    Class: Normal    Non-formulary    Last ordered: 2 months ago by Missael Amos MD Last refill: 5/5/2023    Rx #: [id number]    Diabetic Supplies Protocol Ymnnfb5307/17/2023 01:58 AM    Appointment in the past 12 or next 3 months       Name from pharmacy: [address] [place] [address] 70 % Pad         Will file in chart as: DROPSAFE ALCOHOL PREP 70 % Does not apply Pads    Sig: USE TWICE DAILY WHEN CHECKING BLOOD SUGAR    Disp: Not specified (Pharmacy requested: [address] Not Specified)    Refills: 0 (Pharmacy requested: Not specified)    Start: 7/17/2023    Class: Normal    Non-formulary    Last ordered: 4 months ago by Missael Amos MD Last refill: 5/5/2023    Rx #: [id number]       To be filled at: [facility], [address] [phone], [phone]

## 2023-08-14 ENCOUNTER — OFFICE VISIT (OUTPATIENT)
Dept: FAMILY MEDICINE CLINIC | Facility: CLINIC | Age: 73
End: 2023-08-14
Payer: MEDICARE

## 2023-08-14 VITALS
TEMPERATURE: 98 F | HEART RATE: 68 BPM | DIASTOLIC BLOOD PRESSURE: 78 MMHG | RESPIRATION RATE: 14 BRPM | SYSTOLIC BLOOD PRESSURE: 128 MMHG

## 2023-08-14 DIAGNOSIS — I10 ESSENTIAL HYPERTENSION: Primary | ICD-10-CM

## 2023-08-14 PROCEDURE — 3074F SYST BP LT 130 MM HG: CPT | Performed by: FAMILY MEDICINE

## 2023-08-14 PROCEDURE — 3078F DIAST BP <80 MM HG: CPT | Performed by: FAMILY MEDICINE

## 2023-08-14 PROCEDURE — 1159F MED LIST DOCD IN RCRD: CPT | Performed by: FAMILY MEDICINE

## 2023-08-14 PROCEDURE — 99214 OFFICE O/P EST MOD 30 MIN: CPT | Performed by: FAMILY MEDICINE

## 2023-08-22 DIAGNOSIS — Z12.31 VISIT FOR SCREENING MAMMOGRAM: Primary | ICD-10-CM

## 2023-09-11 ENCOUNTER — HOSPITAL ENCOUNTER (OUTPATIENT)
Dept: MAMMOGRAPHY | Age: 73
Discharge: HOME | End: 2023-09-11
Attending: FAMILY MEDICINE

## 2023-09-11 DIAGNOSIS — Z12.31 VISIT FOR SCREENING MAMMOGRAM: ICD-10-CM

## 2023-09-11 PROCEDURE — 77063 BREAST TOMOSYNTHESIS BI: CPT

## 2023-09-15 ENCOUNTER — PATIENT MESSAGE (OUTPATIENT)
Dept: FAMILY MEDICINE CLINIC | Facility: CLINIC | Age: 73
End: 2023-09-15

## 2023-09-15 DIAGNOSIS — E11.8 CONTROLLED TYPE 2 DIABETES MELLITUS WITH COMPLICATION, WITHOUT LONG-TERM CURRENT USE OF INSULIN (HCC): Primary | ICD-10-CM

## 2023-09-18 ENCOUNTER — OFFICE VISIT (OUTPATIENT)
Dept: FAMILY MEDICINE CLINIC | Facility: CLINIC | Age: 73
End: 2023-09-18
Payer: MEDICARE

## 2023-09-18 VITALS
DIASTOLIC BLOOD PRESSURE: 80 MMHG | OXYGEN SATURATION: 97 % | RESPIRATION RATE: 18 BRPM | WEIGHT: 263.5 LBS | HEART RATE: 85 BPM | BODY MASS INDEX: 44.44 KG/M2 | TEMPERATURE: 97 F | SYSTOLIC BLOOD PRESSURE: 132 MMHG | HEIGHT: 64.5 IN

## 2023-09-18 DIAGNOSIS — E11.9 TYPE 2 DIABETES MELLITUS WITHOUT COMPLICATION, WITHOUT LONG-TERM CURRENT USE OF INSULIN (HCC): ICD-10-CM

## 2023-09-18 DIAGNOSIS — I10 PRIMARY HYPERTENSION: Primary | ICD-10-CM

## 2023-09-18 DIAGNOSIS — Z23 NEED FOR INFLUENZA VACCINATION: ICD-10-CM

## 2023-09-18 PROBLEM — J41.0 SMOKERS' COUGH (HCC): Chronic | Status: ACTIVE | Noted: 2023-09-18

## 2023-09-18 PROCEDURE — 99214 OFFICE O/P EST MOD 30 MIN: CPT | Performed by: STUDENT IN AN ORGANIZED HEALTH CARE EDUCATION/TRAINING PROGRAM

## 2023-09-18 PROCEDURE — 3075F SYST BP GE 130 - 139MM HG: CPT | Performed by: STUDENT IN AN ORGANIZED HEALTH CARE EDUCATION/TRAINING PROGRAM

## 2023-09-18 PROCEDURE — 3079F DIAST BP 80-89 MM HG: CPT | Performed by: STUDENT IN AN ORGANIZED HEALTH CARE EDUCATION/TRAINING PROGRAM

## 2023-09-18 PROCEDURE — 90662 IIV NO PRSV INCREASED AG IM: CPT | Performed by: STUDENT IN AN ORGANIZED HEALTH CARE EDUCATION/TRAINING PROGRAM

## 2023-09-18 PROCEDURE — 1159F MED LIST DOCD IN RCRD: CPT | Performed by: STUDENT IN AN ORGANIZED HEALTH CARE EDUCATION/TRAINING PROGRAM

## 2023-09-18 PROCEDURE — G0008 ADMIN INFLUENZA VIRUS VAC: HCPCS | Performed by: STUDENT IN AN ORGANIZED HEALTH CARE EDUCATION/TRAINING PROGRAM

## 2023-09-18 PROCEDURE — 3008F BODY MASS INDEX DOCD: CPT | Performed by: STUDENT IN AN ORGANIZED HEALTH CARE EDUCATION/TRAINING PROGRAM

## 2023-09-18 PROCEDURE — 1160F RVW MEDS BY RX/DR IN RCRD: CPT | Performed by: STUDENT IN AN ORGANIZED HEALTH CARE EDUCATION/TRAINING PROGRAM

## 2023-09-18 RX ORDER — LISINOPRIL 10 MG/1
10 TABLET ORAL DAILY
Qty: 90 TABLET | Refills: 0 | Status: SHIPPED | OUTPATIENT
Start: 2023-09-18 | End: 2023-12-17

## 2023-09-23 DIAGNOSIS — E11.9 TYPE 2 DIABETES MELLITUS WITHOUT COMPLICATION, UNSPECIFIED WHETHER LONG TERM INSULIN USE (HCC): ICD-10-CM

## 2023-09-25 RX ORDER — PRAVASTATIN SODIUM 40 MG
TABLET ORAL
Qty: 90 TABLET | Refills: 0 | Status: SHIPPED | OUTPATIENT
Start: 2023-09-25

## 2023-09-25 RX ORDER — TORSEMIDE 20 MG/1
TABLET ORAL
Qty: 90 TABLET | Refills: 0 | Status: SHIPPED | OUTPATIENT
Start: 2023-09-25

## 2023-09-25 NOTE — TELEPHONE ENCOUNTER
Requested Renewals     Name from pharmacy: PRAVASTATIN SODIUM 40 MG Tablet         Will file in chart as: PRAVASTATIN 40 MG Oral Tab    Sig: TAKE 1 TABLET EVERY NIGHT    Disp: 90 tablet    Refills: 1 (Pharmacy requested: Not specified)    Start: 9/23/2023    Class: Normal    Non-formulary For: Type 2 diabetes mellitus without complication, unspecified whether long term insulin use (HCC)    Last ordered: 6 months ago (3/29/2023) by Abelino Pan MD    Last refill: 8/23/2023    Rx #: [id number]    Cholesterol Medication Protocol Lzsnwa2609/23/2023 01:59 AM   Protocol Details ALT < 80    ALT resulted within past year    Lipid panel within past 12 months    Appointment within past 12 or next 3 months       Name from pharmacy: TORSEMIDE 20 MG Tablet         Will file in chart as: TORSEMIDE 20 MG Oral Tab    Sig: TAKE 1 TABLET EVERY DAY    Disp: 90 tablet    Refills: 1 (Pharmacy requested: Not specified)    Start: 9/23/2023    Class: Normal    Non-formulary    Last ordered: 6 months ago (3/29/2023) by Abelino Pan MD    Last refill: 8/23/2023    Rx #: [id number]    Hypertension Medications Protocol Xfysdy6809/23/2023 01:59 AM   Protocol Details CMP or BMP in past 12 months    Last serum creatinine< 2.0    Appointment in past 6 or next 3 months             Future Appointments   Date Time Provider Baldev Corona   12/18/2023  8:10 AM Andee Heimlich, MD [facility] [address] [facility] [address]     LOV: 9/18/23  RTC: 3 months  Labs ordered.

## 2023-10-02 RX ORDER — METOPROLOL SUCCINATE 25 MG/1
12.5 TABLET, EXTENDED RELEASE ORAL DAILY
Qty: 45 TABLET | Refills: 0 | Status: SHIPPED | OUTPATIENT
Start: 2023-10-02

## 2023-10-02 NOTE — TELEPHONE ENCOUNTER
Name from pharmacy: METOPROLOL SUCCINATE ER 25 MG Tablet Extended Release 24 Hour         Will file in chart as: METOPROLOL SUCCINATE ER 25 MG Oral Tablet 24 Hr    Sig: TAKE 1/2 TABLET EVERY DAY  (BETA  BLOCKER)    Disp: 45 tablet    Refills: 0 (Pharmacy requested: Not specified)    Start: 9/30/2023    Class: Normal    Non-formulary    Last refill: 7/20/2023    Hypertension Medications Protocol Cksusq2009/30/2023 02:11 AM   Protocol Details CMP or BMP in past 12 months    Last serum creatinine< 2.0    Appointment in past 6 or next 3 months      [facility], [facility] [address] [place] [address] [phone], [phone]

## 2023-10-10 NOTE — TELEPHONE ENCOUNTER
Requested Renewals     Name from pharmacy: [address] [place] [address],[address]         Will file in chart as: METFORMIN HCL 1000 MG Oral Tab    Sig: TAKE 1 TABLET TWICE DAILY WITH MEALS    Disp: 180 tablet    Refills: 10    Start: 10/9/2023    Class: Normal    Non-formulary    Last ordered: 4 months ago (5/16/2023) by Carol Molina MD    Last refill: 7/28/2023    Rx #: [id number]    Diabetic Medication Protocol Ybfnwx78/09/2023 02:30 AM   Protocol Details HgBA1C procedure resulted in past 6 months    Last HgBA1C < 7.5    Microalbumin procedure in past 12 months or taking ACE/ARB    Appointment in past 6 or next 3 months             Future Appointments   Date Time Provider Baldev Corona   12/18/2023  8:10 AM Yelena Rodarte MD [facility] [address] [facility] [address]

## 2023-10-30 ENCOUNTER — PATIENT MESSAGE (OUTPATIENT)
Dept: FAMILY MEDICINE CLINIC | Facility: CLINIC | Age: 73
End: 2023-10-30

## 2023-10-31 NOTE — TELEPHONE ENCOUNTER
From: Jesús Kennedy  To: Albina Mcclure  Sent: 10/30/2023 3:43 PM CDT  Subject: A1c blood test    Have my orders for my a1c blood test been sent to [facility] yet?

## 2023-11-06 PROCEDURE — 3051F HG A1C>EQUAL 7.0%<8.0%: CPT | Performed by: STUDENT IN AN ORGANIZED HEALTH CARE EDUCATION/TRAINING PROGRAM

## 2023-11-07 LAB
ALBUMIN/GLOBULIN RATIO: 1.5 (CALC) (ref 1–2.5)
ALBUMIN: 4.1 G/DL (ref 3.6–5.1)
ALKALINE PHOSPHATASE: 51 U/L (ref 37–153)
ALT: 17 U/L (ref 6–29)
AST: 12 U/L (ref 10–35)
BILIRUBIN, TOTAL: 0.3 MG/DL (ref 0.2–1.2)
BUN/CREATININE RATIO: 25 (CALC) (ref 6–22)
BUN: 37 MG/DL (ref 7–25)
CALCIUM: 10.3 MG/DL (ref 8.6–10.4)
CARBON DIOXIDE: 29 MMOL/L (ref 20–32)
CHLORIDE: 97 MMOL/L (ref 98–110)
CREATININE: 1.51 MG/DL (ref 0.6–1)
EGFR: 36 ML/MIN/1.73M2
GLOBULIN: 2.8 G/DL (CALC) (ref 1.9–3.7)
GLUCOSE: 160 MG/DL (ref 65–139)
HEMOGLOBIN A1C: 7.1 % OF TOTAL HGB
POTASSIUM: 5.1 MMOL/L (ref 3.5–5.3)
PROTEIN, TOTAL: 6.9 G/DL (ref 6.1–8.1)
SODIUM: 138 MMOL/L (ref 135–146)

## 2023-11-13 DIAGNOSIS — N18.31 STAGE 3A CHRONIC KIDNEY DISEASE (HCC): Primary | ICD-10-CM

## 2023-11-22 ENCOUNTER — HOSPITAL ENCOUNTER (OUTPATIENT)
Facility: HOSPITAL | Age: 73
Setting detail: HOSPITAL OUTPATIENT SURGERY
Discharge: HOME | End: 2023-11-22
Attending: INTERNAL MEDICINE | Admitting: INTERNAL MEDICINE
Payer: MEDICARE

## 2023-11-22 ENCOUNTER — ANESTHESIA EVENT (OUTPATIENT)
Dept: ENDOSCOPY | Facility: HOSPITAL | Age: 73
End: 2023-11-22
Payer: MEDICARE

## 2023-11-22 ENCOUNTER — ANESTHESIA (OUTPATIENT)
Dept: ENDOSCOPY | Facility: HOSPITAL | Age: 73
End: 2023-11-22
Payer: MEDICARE

## 2023-11-22 VITALS
OXYGEN SATURATION: 96 % | HEART RATE: 66 BPM | TEMPERATURE: 98 F | HEIGHT: 65 IN | RESPIRATION RATE: 17 BRPM | BODY MASS INDEX: 43.32 KG/M2 | WEIGHT: 260 LBS | SYSTOLIC BLOOD PRESSURE: 132 MMHG | DIASTOLIC BLOOD PRESSURE: 62 MMHG

## 2023-11-22 DIAGNOSIS — Z86.010 HISTORY OF ADENOMATOUS POLYP OF COLON: ICD-10-CM

## 2023-11-22 LAB — GLUCOSE BLDC GLUCOMTR-MCNC: 156 MG/DL (ref 70–99)

## 2023-11-22 PROCEDURE — 0DBN8ZX EXCISION OF SIGMOID COLON, VIA NATURAL OR ARTIFICIAL OPENING ENDOSCOPIC, DIAGNOSTIC: ICD-10-PCS | Performed by: INTERNAL MEDICINE

## 2023-11-22 PROCEDURE — 88305 TISSUE EXAM BY PATHOLOGIST: CPT | Performed by: INTERNAL MEDICINE

## 2023-11-22 PROCEDURE — 82962 GLUCOSE BLOOD TEST: CPT

## 2023-11-22 RX ORDER — SODIUM CHLORIDE 0.9 % (FLUSH) 0.9 %
10 SYRINGE (ML) INJECTION AS NEEDED
Status: DISCONTINUED | OUTPATIENT
Start: 2023-11-22 | End: 2023-11-22

## 2023-11-22 RX ORDER — SODIUM CHLORIDE, SODIUM LACTATE, POTASSIUM CHLORIDE, CALCIUM CHLORIDE 600; 310; 30; 20 MG/100ML; MG/100ML; MG/100ML; MG/100ML
INJECTION, SOLUTION INTRAVENOUS CONTINUOUS
Status: DISCONTINUED | OUTPATIENT
Start: 2023-11-22 | End: 2023-11-22

## 2023-11-22 RX ORDER — LIDOCAINE HYDROCHLORIDE 10 MG/ML
INJECTION, SOLUTION EPIDURAL; INFILTRATION; INTRACAUDAL; PERINEURAL AS NEEDED
Status: DISCONTINUED | OUTPATIENT
Start: 2023-11-22 | End: 2023-11-22 | Stop reason: SURG

## 2023-11-22 RX ORDER — MIDAZOLAM HYDROCHLORIDE 1 MG/ML
1 INJECTION INTRAMUSCULAR; INTRAVENOUS EVERY 5 MIN PRN
Status: DISCONTINUED | OUTPATIENT
Start: 2023-11-22 | End: 2023-11-22

## 2023-11-22 RX ADMIN — LIDOCAINE HYDROCHLORIDE 50 MG: 10 INJECTION, SOLUTION EPIDURAL; INFILTRATION; INTRACAUDAL; PERINEURAL at 10:36:00

## 2023-11-22 NOTE — OPERATIVE REPORT
COLONOSCOPY REPORT    Patient Name:  [facility] Record #: [id number]  YOB: 1950  Date of Procedure: 11/22/2023    Referring physician: Shawanda Woodward MD    Surgeon:  Elie Gill. Naty Sahni MD    Pre-op diagnosis: History of large colon polyp resected in 2017  (Last colonoscopy 5 years ago)  Post-op diagnosis: Diverticulosis and 2 small polyps    Medications given:  MAC    Procedure:    After informed consent, discussion of risks and alternatives the patient was placed in the left lateral decubitus position and the high definition colonoscope was introduced into the rectum and advanced under direct visualization to the ileum. Bowel preparation was excellent. The mucosa was carefully inspected upon withdrawal of the scope. Withdrawal time was 7 minutes. ASA class was 3. Findings: The ileal mucosa was normal. .  To small proximal sigmoid polyps were cold snare excised. There is extensive diverticulosis throughout the left colon. There was no evidence of ulcerations, colitis, vascular anomalies or mass lesions.    Digital rectal exam was normal.    Plan:  High fiber diet  Await biopsy results  Repeat colonoscopy timing will depend upon review of pathology    Specimens: polyps  EBL: minimal    Aronchick bowel prep scale:  5 Inadequate (repeat preparation needed)  4 Poor (semisolid stool could not be suctioned and <90% of mucosa seen)  3 Fair (semisolid stool could not be suctioned, but >90% of mucosa seen)  2 Good (clear liquid covering up to 25% of mucosa, but >90% of mucosa seen)  1 Excellent (>95% of mucosa seen)

## 2023-11-22 NOTE — DISCHARGE INSTRUCTIONS
ENDOSCOPY DISCHARGE INSTRUCTIONS    Procedure Performed:   Colonoscopy and Polypectomy    Endoscopist: No name on file  FINDINGS:   Internal/external hemorrhoids, Diverticulosis (pockets in colon that develop with age and lack of fiber intake), and Colon polyp(s) (a growth in the colon)    MEDICATIONS:  You may resume all other medications today    DIET:  Daily fiber supplement such as (Citrucel, Metamucil, Benefiber) 1 tabelspoon daily and High fiber/low fat diet    BIOPSIES:  Biopsy results will be released to you as soon as they are available as is now the law. This will not allow your physician the opportunity to go over these before they are released to you. It is not necessary to contact the office for explanation of these results. Your physician will contact you within a few business days of their release to review the findings with you    X-RAYS/LABS:   No X-rays/Labs were ordered today    ADDITIONAL RECOMMENDATIONS:        Activity for remainder of today:    REST TODAY  DO NOT drive or operate heavy machinery  DO NOT drink any alcoholic beverages  DO NOT sign any legal documents or make any important decisions    After your procedure(s): It is not unusual to feel bloated or gassy . Passing gas and belching is encouraged. Lying on your left side with your knees flexed may relieve the discomfort. A hot pack to the abdomen may also help. After your gastroscopy (upper endoscopy): You may experience a slight sore throat which will subside. Throat lozenges or salt water gargle can be used.     FOLLOW-UP:  Contact the office at [phone] for follow-up appointment is needed or if you develop any of the following:    Severe abdominal pain/discomfort     Excessive bleeding                     Black tarry stool    Difficulty breathing/swallowing      Persistent nausea/vomiting  Fever above 100 degrees or chills            Home Care Instructions for Colonoscopy with Sedation    Diet:  - Resume your regular diet as tolerated unless otherwise instructed. - Start with light meals to minimize bloating.  - Do not drink alcohol today. Medication:  - If you have questions about resuming your normal medications, please contact your Primary Care Physician. Activities:  - Take it easy today. Do not return to work today. - Do not drive today. - Do not operate any machinery today (including kitchen equipment). Colonoscopy:  - You may notice some rectal \"spotting\" (a little blood on the toilet tissue) for a day or two after the exam. This is normal.  - If you experience any rectal bleeding (not spotting), persistent tenderness or sharp severe abdominal pains, oral temperature over 100 degrees Fahrenheit, light-headedness or dizziness, or any other problems, contact your doctor. **If unable to reach your doctor, please go to the [facility] Emergency Room**    - Your referring physician will receive a full report of your examination.  - If you do not hear from your doctor's office within two weeks of your biopsy, please call them for your results. You may be able to see your laboratory results in [facility] between 4 and 7 business days. In some cases, your physician may not have viewed the results before they are released to [address] If you have questions regarding your results contact the physician who ordered the test/exam by phone or via [address] by choosing \"Ask a Medical Question. \"

## 2023-11-22 NOTE — ANESTHESIA POSTPROCEDURE EVALUATION
Patient: Margot Soares    Procedure Summary       Date: 11/22/23 Room / Location: [facility] ENDOSCOPY 04 / [facility] ENDOSCOPY    Anesthesia Start: 5104 Anesthesia Stop:     Procedure: COLONOSCOPY Diagnosis:       History of adenomatous polyp of colon      (polyps, diverticulosis)    Surgeons: Viraj Rodriguez MD Anesthesiologist: Saúl Mendieta CRNA    Anesthesia Type: general ASA Status: 3            Anesthesia Type: general    Vitals Value Taken Time   /74 11/22/23 1053   Temp 98 11/22/23 1053   Pulse 65 11/22/23 1053   Resp 12 11/22/23 1053   SpO2 99 11/22/23 1053       [facility] AN Post Evaluation:   Patient Evaluated in PACU  Patient Participation: complete - patient participated  Level of Consciousness: sleepy but conscious  Pain Score: 0  Pain Management: adequate  Airway Patency:patent  Dental exam unchanged from preop  Yes    Cardiovascular Status: acceptable  Respiratory Status: acceptable  Postoperative Hydration acceptable      Taurus Beebe CRNA  11/22/2023 10:53 AM

## 2023-12-12 LAB
BUN/CREATININE RATIO: 20 (CALC) (ref 6–22)
BUN: 29 MG/DL (ref 7–25)
CALCIUM: 9.8 MG/DL (ref 8.6–10.4)
CARBON DIOXIDE: 28 MMOL/L (ref 20–32)
CHLORIDE: 99 MMOL/L (ref 98–110)
CREATININE: 1.47 MG/DL (ref 0.6–1)
EGFR: 37 ML/MIN/1.73M2
GLUCOSE: 199 MG/DL (ref 65–99)
POTASSIUM: 4.4 MMOL/L (ref 3.5–5.3)
SODIUM: 140 MMOL/L (ref 135–146)

## 2023-12-15 DIAGNOSIS — E11.8 CONTROLLED DIABETES MELLITUS TYPE 2 WITH COMPLICATIONS, UNSPECIFIED WHETHER LONG TERM INSULIN USE (HCC): ICD-10-CM

## 2023-12-15 RX ORDER — ISOPROPYL ALCOHOL 70 ML/100ML
SWAB TOPICAL
Qty: 200 EACH | Refills: 1 | Status: SHIPPED | OUTPATIENT
Start: 2023-12-15

## 2023-12-15 RX ORDER — BLOOD SUGAR DIAGNOSTIC
STRIP MISCELLANEOUS
Qty: 200 STRIP | Refills: 2 | Status: SHIPPED | OUTPATIENT
Start: 2023-12-15

## 2023-12-18 ENCOUNTER — OFFICE VISIT (OUTPATIENT)
Dept: FAMILY MEDICINE CLINIC | Facility: CLINIC | Age: 73
End: 2023-12-18
Payer: MEDICARE

## 2023-12-18 VITALS
WEIGHT: 262 LBS | DIASTOLIC BLOOD PRESSURE: 62 MMHG | HEIGHT: 65 IN | HEART RATE: 65 BPM | TEMPERATURE: 97 F | RESPIRATION RATE: 16 BRPM | OXYGEN SATURATION: 96 % | BODY MASS INDEX: 43.65 KG/M2 | SYSTOLIC BLOOD PRESSURE: 130 MMHG

## 2023-12-18 DIAGNOSIS — E11.22 TYPE 2 DIABETES MELLITUS WITH STAGE 3 CHRONIC KIDNEY DISEASE, WITHOUT LONG-TERM CURRENT USE OF INSULIN, UNSPECIFIED WHETHER STAGE 3A OR 3B CKD (HCC): ICD-10-CM

## 2023-12-18 DIAGNOSIS — I10 PRIMARY HYPERTENSION: ICD-10-CM

## 2023-12-18 DIAGNOSIS — N18.30 TYPE 2 DIABETES MELLITUS WITH STAGE 3 CHRONIC KIDNEY DISEASE, WITHOUT LONG-TERM CURRENT USE OF INSULIN, UNSPECIFIED WHETHER STAGE 3A OR 3B CKD (HCC): ICD-10-CM

## 2023-12-18 DIAGNOSIS — I50.9 CHRONIC CONGESTIVE HEART FAILURE, UNSPECIFIED HEART FAILURE TYPE (HCC): ICD-10-CM

## 2023-12-18 DIAGNOSIS — N18.30 STAGE 3 CHRONIC KIDNEY DISEASE, UNSPECIFIED WHETHER STAGE 3A OR 3B CKD (HCC): Primary | ICD-10-CM

## 2023-12-18 PROCEDURE — 3078F DIAST BP <80 MM HG: CPT | Performed by: STUDENT IN AN ORGANIZED HEALTH CARE EDUCATION/TRAINING PROGRAM

## 2023-12-18 PROCEDURE — 99214 OFFICE O/P EST MOD 30 MIN: CPT | Performed by: STUDENT IN AN ORGANIZED HEALTH CARE EDUCATION/TRAINING PROGRAM

## 2023-12-18 PROCEDURE — 3008F BODY MASS INDEX DOCD: CPT | Performed by: STUDENT IN AN ORGANIZED HEALTH CARE EDUCATION/TRAINING PROGRAM

## 2023-12-18 PROCEDURE — 3075F SYST BP GE 130 - 139MM HG: CPT | Performed by: STUDENT IN AN ORGANIZED HEALTH CARE EDUCATION/TRAINING PROGRAM

## 2023-12-18 PROCEDURE — 1160F RVW MEDS BY RX/DR IN RCRD: CPT | Performed by: STUDENT IN AN ORGANIZED HEALTH CARE EDUCATION/TRAINING PROGRAM

## 2023-12-18 PROCEDURE — 1159F MED LIST DOCD IN RCRD: CPT | Performed by: STUDENT IN AN ORGANIZED HEALTH CARE EDUCATION/TRAINING PROGRAM

## 2024-01-08 ENCOUNTER — VIRTUAL PHONE E/M (OUTPATIENT)
Dept: FAMILY MEDICINE CLINIC | Facility: CLINIC | Age: 74
End: 2024-01-08
Payer: MEDICARE

## 2024-01-08 DIAGNOSIS — N18.30 STAGE 3 CHRONIC KIDNEY DISEASE, UNSPECIFIED WHETHER STAGE 3A OR 3B CKD (HCC): Chronic | ICD-10-CM

## 2024-01-08 DIAGNOSIS — U07.1 COVID-19 VIRUS INFECTION: Primary | ICD-10-CM

## 2024-01-08 RX ORDER — BENZONATATE 100 MG/1
100 CAPSULE ORAL 3 TIMES DAILY PRN
Qty: 30 CAPSULE | Refills: 0 | Status: SHIPPED | OUTPATIENT
Start: 2024-01-08 | End: 2024-01-18

## 2024-01-08 NOTE — PROGRESS NOTES
Subjective:      No chief complaint on file.    HISTORY OF PRESENT ILLNESS  HPI  HPI obtained per patient report.  Huyen Renner is a pleasant 73 year old female presenting with Covid-19.     She reports that a dry cough, runny nose, and fatigue began on 24. She denies any other symptoms including SOB or CP. She tested positive for Covid-19 this morning.   Her  had Covid-19 recently.     PAST PATIENT HISTORY  Past Medical History:   Diagnosis Date    Acute congestive heart failure (HCC) 2019    Acute diastolic heart failure (HCC) 2019    Acute renal failure, unspecified acute renal failure type (HCC) 2019    JENNIFER (acute kidney injury) ([facility]) 2019    ATN (acute tubular necrosis) ([facility])     Bronchopneumonia, organism unspecified     Congestive heart disease (HCC)     Diabetes ([facility])     Essential hypertension     Hearing impairment     High blood pressure     High cholesterol     Hyperkalemia 2019    Hyperlipidemia     Hyponatremia 2019    Laboratory examination ordered as part of a routine general medical examination 10/07/2010    Obesity     Pure hypercholesterolemia 2012    Sleep apnea     Thrombocytopenia ([facility]) 2020    Visual impairment     Reading glasses     Past Surgical History:   Procedure Laterality Date    CHOLECYSTECTOMY      COLONOSCOPY      COLONOSCOPY N/A 2017    Procedure: COLONOSCOPY;  Surgeon: Mayo Sewell MD;  Location: [facility] ENDOSCOPY    COLONOSCOPY  2023    COLONOSCOPY N/A 2018    Procedure: COLONOSCOPY;  Surgeon: Mayo Sewell MD;  Location: [facility] ENDOSCOPY    COLONOSCOPY N/A 2023    Procedure: COLONOSCOPY;  Surgeon: Mayo Sewell MD;  Location: [facility] ENDOSCOPY    HYSTERECTOMY  2000    partial hysteectomy          TONSILLECTOMY         CURRENT MEDICATIONS  Outpatient Medications Marked as Taking for the 24 encounter (Virtual Phone E/M) with Oneida James MD   Medication Sig Dispense Refill    benzonatate  100 MG Oral Cap Take 1 capsule (100 mg total) by mouth 3 (three) times daily as needed for cough. SWALLOW CAPSULE WHOLE. DO NOT BREAK, CUT, CRUSH, CHEW, OR DISSOLVE CAPSULE. 30 capsule 0    nirmatrelvir-ritonavir 150-100 MG Oral Tablet Therapy Pack Take one nirmatrelvir tablet (150mg) with one ritonavir tablet (100mg) together twice daily for 5 days.   (Nirmatrelvir dose is renally adjusted) 20 tablet 0       HEALTH MAINTENANCE  Immunization History   Administered Date(s) Administered    >=3 YRS TRI  MULTIDOSE VIAL (13382) FLU CLINIC 2014    Covid-19 Vaccine Pfizer 30 mcg/0.3 ml 2021, 2021, 10/06/2021    Covid-19 Vaccine Pfizer Bivalent 30mcg/0.3mL 10/29/2022    Covid-19 Vaccine Pfizer Darwin-Sucrose 30 mcg/0.3 ml 2022    FLU VAC High Dose 65 YRS & Older PRSV Free (48309) 2017, 2020, 2021, 10/17/2022, 2023    FLUAD High Dose 65 yr and older (45826) 09/10/2018, 2019    Fluvirin, 3 Years & >, Im 2014    Fluzone Vaccine Medicare () 2017    Influenza 10/02/2019    Pneumococcal (Prevnar 13) 2017    Pneumovax 23 09/10/2018   Deferred Date(s) Deferred    Pneumovax 23 2016       ALLERGIES AND DRUG REACTIONS  No Known Allergies    Family History   Problem Relation Age of Onset    Diabetes Mother     Stroke Mother     Cancer Sister         breast    Diabetes Sister     Obesity Sister     Diabetes Brother     Obesity Brother      Social History     Socioeconomic History    Marital status:    Tobacco Use    Smoking status: Former     Packs/day: 1     Types: Cigarettes     Quit date: 1987     Years since quittin.7    Smokeless tobacco: Never   Vaping Use    Vaping Use: Never used   Substance and Sexual Activity    Alcohol use: Not Currently    Drug use: Never   Other Topics Concern    Caffeine Concern No    Stress Concern No    Weight Concern No    Special Diet No    Exercise No    Seat Belt No       Review of Systems    Constitutional:  Positive for fatigue. Negative for chills and fever.   HENT:  Positive for rhinorrhea. Negative for congestion, ear pain and sore throat.    Respiratory:  Positive for cough. Negative for shortness of breath and wheezing.    Cardiovascular:  Negative for chest pain.   Gastrointestinal: Negative.    Musculoskeletal:  Negative for myalgias.   Neurological:  Negative for dizziness.          Objective:      There were no vitals taken for this visit.  There is no height or weight on file to calculate BMI.    Physical Exam       Assessment and Plan:      1. COVID-19 virus infection (Primary)  -     Benzonatate; Take 1 capsule (100 mg total) by mouth 3 (three) times daily as needed for cough. SWALLOW CAPSULE WHOLE. DO NOT BREAK, CUT, CRUSH, CHEW, OR DISSOLVE CAPSULE.  Dispense: 30 capsule; Refill: 0  -     Nirmatrelvir&Ritonavir 150/100; Take one nirmatrelvir tablet (150mg) with one ritonavir tablet (100mg) together twice daily for 5 days.   (Nirmatrelvir dose is renally adjusted)  Dispense: 20 tablet; Refill: 0  2. Stage 3 chronic kidney disease, unspecified whether stage 3a or 3b CKD ([facility])  Overview:  stable  Orders:  -     Nirmatrelvir&Ritonavir 150/100; Take one nirmatrelvir tablet (150mg) with one ritonavir tablet (100mg) together twice daily for 5 days.   (Nirmatrelvir dose is renally adjusted)  Dispense: 20 tablet; Refill: 0    Return if symptoms worsen or fail to improve.    - recommended paxlovid as prescribed. Renally dosed due to history of CKD 3  - recommended rest, drinking plenty of fluids, and tessalon perles as needed for cough  - we discussed the CDC guidelines for self-isolation for Covid-19    Patient verbalized understanding of assessment and recommendations. All questions and concerns were addressed.    Telehealth appointment with audio was scheduled and completed with the patient's informed consent. Duration of the telehealth appointment was 11 minutes. Telehealth appointment took  place in context of CDC social distancing guidelines during the Covid-19 pandemic.    Electronically signed by Oneida James MD

## 2024-02-07 DIAGNOSIS — I10 PRIMARY HYPERTENSION: ICD-10-CM

## 2024-02-07 RX ORDER — LISINOPRIL 10 MG/1
10 TABLET ORAL DAILY
Qty: 90 TABLET | Refills: 0 | Status: SHIPPED | OUTPATIENT
Start: 2024-02-07

## 2024-02-07 RX ORDER — LANCETS
1 EACH MISCELLANEOUS 2 TIMES DAILY
Qty: 200 EACH | Refills: 3 | Status: SHIPPED | OUTPATIENT
Start: 2024-02-07

## 2024-02-07 NOTE — TELEPHONE ENCOUNTER
Name from pharmacy: ACCU-CHEK SOFTCLIX LANCETS          Will file in chart as: ACCU-CHEK SOFTCLIX LANCETS Does not apply Misc    Sig: TEST BLOOD SUGAR TWICE DAILY    Disp: 200 each    Refills: 3    Start: 2/7/2024    Class: Normal    Non-formulary    Last ordered: 6 months ago (7/17/2023) by Yung Wood MD    Last refill: 11/25/2023    Rx #: [id number]    Diabetic Supplies Protocol [id number]/07/2024 03:25 AM   Protocol Details In person appointment or virtual visit in the past 12 mos or appointment in next 3 mos      To be filled at: [facility] - [facility] [address] [phone], [phone]

## 2024-02-07 NOTE — TELEPHONE ENCOUNTER
Requested Renewals     Name from pharmacy: LISINOPRIL 10 MG Tablet         Will file in chart as: LISINOPRIL 10 MG Oral Tab    Sig: TAKE 1 TABLET EVERY DAY    Disp: 90 tablet    Refills: 3    Start: 2/7/2024    Class: Normal    Non-formulary For: Primary hypertension    Last ordered: 4 months ago (9/18/2023) by Oneida James MD    Last refill: 11/25/2023    Rx #: [id number]    Hypertension Medications Protocol [id number]/07/2024 02:22 PM   Protocol Details EGFRCR or GFRNAA > 50    CMP or BMP in past 12 months    Last BP reading less than 140/90    In person appointment or virtual visit in the past 12 mos or appointment in next 3 mos             Future Appointments   Date Time Provider Department Center   3/18/2024 10:00 AM Oneida James MD [facility] [address] [facility] [address]     LOV: 1/8/24 virtual phone visit  Labs done 12/11/23  Last refill given on 9/18/23 for #90    -medication pended for review.

## 2024-02-13 ENCOUNTER — PATIENT MESSAGE (OUTPATIENT)
Dept: FAMILY MEDICINE CLINIC | Facility: CLINIC | Age: 74
End: 2024-02-13

## 2024-02-13 DIAGNOSIS — E78.5 HYPERLIPIDEMIA, UNSPECIFIED HYPERLIPIDEMIA TYPE: ICD-10-CM

## 2024-02-13 DIAGNOSIS — E11.9 TYPE 2 DIABETES MELLITUS WITHOUT COMPLICATION, WITHOUT LONG-TERM CURRENT USE OF INSULIN (HCC): ICD-10-CM

## 2024-02-13 DIAGNOSIS — N18.30 STAGE 3 CHRONIC KIDNEY DISEASE, UNSPECIFIED WHETHER STAGE 3A OR 3B CKD (HCC): Primary | ICD-10-CM

## 2024-02-13 NOTE — TELEPHONE ENCOUNTER
From: Huyen Renner  To: Oneida James  Sent: 2/13/2024 11:31 AM CST  Subject: Lab Orders    Have the lab orders for my A1c test been sent to [facility]?  Is it going to be a fasting test?  Thanks,  Huyen Renner

## 2024-02-13 NOTE — TELEPHONE ENCOUNTER
See message below.    Future Appointments   Date Time Provider Department Center   3/18/2024 10:00 AM Oneida James MD [facility] [address] [facility] [address]       LOV: 1/8/24 virtual phone visit  BMP done 12/11/23  CMP and A1C done 11/6/23    Last full labs done 6/19/23.    - labs pended if ok

## 2024-02-26 ENCOUNTER — TELEPHONE (OUTPATIENT)
Dept: FAMILY MEDICINE CLINIC | Facility: CLINIC | Age: 74
End: 2024-02-26

## 2024-02-26 RX ORDER — LANCETS
1 EACH MISCELLANEOUS 2 TIMES DAILY
Qty: 200 EACH | Refills: 2 | Status: SHIPPED | OUTPATIENT
Start: 2024-02-26 | End: 2025-02-25

## 2024-02-26 NOTE — TELEPHONE ENCOUNTER
Patient requesting new prescription for pen and needles, requesting Accu-Chek  Fastclix. Patient requesting for prescription to be sent to mail order, please advise

## 2024-02-29 RX ORDER — LANCETS
1 EACH MISCELLANEOUS 2 TIMES DAILY
Qty: 200 EACH | Refills: 2 | Status: CANCELLED | OUTPATIENT
Start: 2024-02-29 | End: 2025-02-28

## 2024-02-29 RX ORDER — METOPROLOL SUCCINATE 25 MG/1
12.5 TABLET, EXTENDED RELEASE ORAL DAILY
Qty: 45 TABLET | Refills: 0 | OUTPATIENT
Start: 2024-02-29

## 2024-02-29 NOTE — TELEPHONE ENCOUNTER
RX sent on 2/26/24 for #200 with 1 refill to [facility] pharmacy mail order.  This RX denied.  MCM sent to patient.

## 2024-02-29 NOTE — TELEPHONE ENCOUNTER
Patient is requesting an refill on Metoprolol 25mg # 45    LOV- 12/18/23    HTN  - well controlled  - metoprolol discontinued during LOV  - continue lisinopril    Future Appointments   Date Time Provider Department Center   3/22/2024 10:00 AM Oneida James MD [facility] [address] [facility] [address]

## 2024-03-10 LAB
ABSOLUTE BASOPHILS: 22 CELLS/UL (ref 0–200)
ABSOLUTE EOSINOPHILS: 80 CELLS/UL (ref 15–500)
ABSOLUTE LYMPHOCYTES: 2008 CELLS/UL (ref 850–3900)
ABSOLUTE MONOCYTES: 577 CELLS/UL (ref 200–950)
ABSOLUTE NEUTROPHILS: 4614 CELLS/UL (ref 1500–7800)
ALBUMIN/GLOBULIN RATIO: 1.6 (CALC) (ref 1–2.5)
ALBUMIN: 4.1 G/DL (ref 3.6–5.1)
ALKALINE PHOSPHATASE: 46 U/L (ref 37–153)
ALT: 18 U/L (ref 6–29)
AST: 13 U/L (ref 10–35)
BASOPHILS: 0.3 %
BILIRUBIN, TOTAL: 0.4 MG/DL (ref 0.2–1.2)
BUN/CREATININE RATIO: 18 (CALC) (ref 6–22)
BUN: 25 MG/DL (ref 7–25)
CALCIUM: 9.6 MG/DL (ref 8.6–10.4)
CARBON DIOXIDE: 29 MMOL/L (ref 20–32)
CHLORIDE: 102 MMOL/L (ref 98–110)
CHOL/HDLC RATIO: 3.2 (CALC)
CHOLESTEROL, TOTAL: 154 MG/DL
CREATININE: 1.41 MG/DL (ref 0.6–1)
EGFR: 39 ML/MIN/1.73M2
EOSINOPHILS: 1.1 %
GLOBULIN: 2.6 G/DL (CALC) (ref 1.9–3.7)
GLUCOSE: 167 MG/DL (ref 65–99)
HDL CHOLESTEROL: 48 MG/DL
HEMATOCRIT: 36.8 % (ref 35–45)
HEMOGLOBIN A1C: 7.6 % OF TOTAL HGB
HEMOGLOBIN: 11.9 G/DL (ref 11.7–15.5)
LDL-CHOLESTEROL: 78 MG/DL (CALC)
LYMPHOCYTES: 27.5 %
MCH: 30.7 PG (ref 27–33)
MCHC: 32.3 G/DL (ref 32–36)
MCV: 95.1 FL (ref 80–100)
MONOCYTES: 7.9 %
MPV: 11.5 FL (ref 7.5–12.5)
NEUTROPHILS: 63.2 %
NON-HDL CHOLESTEROL: 106 MG/DL (CALC)
PLATELET COUNT: 218 THOUSAND/UL (ref 140–400)
POTASSIUM: 5.1 MMOL/L (ref 3.5–5.3)
PROTEIN, TOTAL: 6.7 G/DL (ref 6.1–8.1)
RDW: 12.8 % (ref 11–15)
RED BLOOD CELL COUNT: 3.87 MILLION/UL (ref 3.8–5.1)
SODIUM: 142 MMOL/L (ref 135–146)
TRIGLYCERIDES: 182 MG/DL
TSH W/REFLEX TO FT4: 3.36 MIU/L (ref 0.4–4.5)
VITAMIN D, 25-OH, TOTAL: 48 NG/ML (ref 30–100)
WHITE BLOOD CELL COUNT: 7.3 THOUSAND/UL (ref 3.8–10.8)

## 2024-03-22 ENCOUNTER — OFFICE VISIT (OUTPATIENT)
Dept: FAMILY MEDICINE CLINIC | Facility: CLINIC | Age: 74
End: 2024-03-22
Payer: MEDICARE

## 2024-03-22 VITALS
WEIGHT: 264.38 LBS | OXYGEN SATURATION: 97 % | HEART RATE: 85 BPM | SYSTOLIC BLOOD PRESSURE: 136 MMHG | TEMPERATURE: 97 F | DIASTOLIC BLOOD PRESSURE: 84 MMHG | RESPIRATION RATE: 16 BRPM | BODY MASS INDEX: 44.05 KG/M2 | HEIGHT: 65 IN

## 2024-03-22 DIAGNOSIS — E55.9 VITAMIN D DEFICIENCY: ICD-10-CM

## 2024-03-22 DIAGNOSIS — E78.2 MIXED HYPERLIPIDEMIA: ICD-10-CM

## 2024-03-22 DIAGNOSIS — I50.9 CHRONIC CONGESTIVE HEART FAILURE, UNSPECIFIED HEART FAILURE TYPE (HCC): ICD-10-CM

## 2024-03-22 DIAGNOSIS — I10 PRIMARY HYPERTENSION: ICD-10-CM

## 2024-03-22 DIAGNOSIS — E11.9 TYPE 2 DIABETES MELLITUS WITHOUT COMPLICATION, WITHOUT LONG-TERM CURRENT USE OF INSULIN (HCC): ICD-10-CM

## 2024-03-22 DIAGNOSIS — Z00.00 WELL ADULT EXAM: Primary | ICD-10-CM

## 2024-03-22 DIAGNOSIS — M85.80 OSTEOPENIA, UNSPECIFIED LOCATION: ICD-10-CM

## 2024-03-22 DIAGNOSIS — N18.32 STAGE 3B CHRONIC KIDNEY DISEASE (HCC): ICD-10-CM

## 2024-03-22 RX ORDER — PRAVASTATIN SODIUM 80 MG/1
80 TABLET ORAL NIGHTLY
Qty: 90 TABLET | Refills: 1 | Status: SHIPPED | OUTPATIENT
Start: 2024-03-22 | End: 2024-06-20

## 2024-03-22 NOTE — PROGRESS NOTES
Subjective:      Chief Complaint   Patient presents with    Wellness Visit     MA supervisit     HISTORY OF PRESENT ILLNESS  HPI  HPI obtained per patient report.  Huyen Renner is a pleasant 73 year old female presenting for her medicare supervisit.   She is accompanied by her  today.   She has been struggling with the passing of her dog since her last visit. She is considering attending grief counseling with a local group.     Medicare Hearing Assessment:   Hearing Screening    Screening Method: Whisper Test  Whisper Test Result: Fail               General Health:  In the past six months, have you lost more than 10 pounds without trying?: 2 - No  Has your appetite been poor?: No  Type of Diet: Low Carb  How does the patient maintain a good energy level?: Daily Walks  How would you describe your daily physical activity?: Light  How would you describe your current health state?: Good  How do you maintain positive mental well-being?: Puzzles;Games;Visiting Family  On a scale of 0 to 10, with 0 being no pain and 10 being severe pain, what is your pain level?: 0 - (None)  In the past six months, have you experienced urine leakage?: 0-No  At any time do you feel concerned for the safety/well-being of yourself and/or your children, in your home or elsewhere?: No  Have you had any immunizations at another office such as Influenza, Hepatitis B, Tetanus, or Pneumococcal?: No    Fall Risk Assessment:   She has been screened for Falls and is low risk.     Cognitive Assessment:   She had a completely normal cognitive assessment - see flowsheet entries     Functional Ability/Status:   Huyen Renner has some abnormal functions as listed below:  She has Hearing problems based on screening of functional status.    Depression Screening (PHQ-2/PHQ-9): PHQ-2 SCORE: 0  , done 3/21/2024   Last [place] Suicide Screening on 3/22/2024 was No Risk.    Advanced Directives:     She does NOT have a Living Will. [Do you have a living  will?: No]  She does NOT have a Power of  for Health Care. [Do you have a healthcare power of ?: No]      PAST PATIENT HISTORY  Past Medical History:   Diagnosis Date    Acute congestive heart failure (HCC) 2019    Acute diastolic heart failure (HCC) 2019    Acute renal failure, unspecified acute renal failure type (HCC) 2019    JENNIFER (acute kidney injury) (HCC) 2019    ATN (acute tubular necrosis) (HCC)     Bronchopneumonia, organism unspecified     Congestive heart disease (HCC)     Diabetes (HCC)     Essential hypertension     Hearing impairment     High blood pressure     High cholesterol     Hyperkalemia 2019    Hyperlipidemia     Hyponatremia 2019    Laboratory examination ordered as part of a routine general medical examination 10/07/2010    Obesity     Osteopenia     Pure hypercholesterolemia 2012    Sleep apnea     Thrombocytopenia ([facility]) 2020    Visual impairment     Reading glasses     Past Surgical History:   Procedure Laterality Date    CHOLECYSTECTOMY      COLONOSCOPY      COLONOSCOPY N/A 2017    Procedure: COLONOSCOPY;  Surgeon: Mayo Sewell MD;  Location: [facility] ENDOSCOPY    COLONOSCOPY  2023    COLONOSCOPY N/A 2018    Procedure: COLONOSCOPY;  Surgeon: Mayo Sewell MD;  Location: [facility] ENDOSCOPY    COLONOSCOPY N/A 2023    Procedure: COLONOSCOPY;  Surgeon: Mayo Sewell MD;  Location: [facility] ENDOSCOPY    HYSTERECTOMY  2000    partial hysteectomy          TONSILLECTOMY         CURRENT MEDICATIONS  Outpatient Medications Marked as Taking for the 3/22/24 encounter (Office Visit) with Oneida James MD   Medication Sig Dispense Refill    Pravastatin Sodium 80 MG Oral Tab Take 1 tablet (80 mg total) by mouth nightly. 90 tablet 1    metFORMIN 500 MG Oral Tab Take 1 tablet (500 mg total) by mouth 2 (two) times daily with meals. 180 tablet 1    empagliflozin 10 MG Oral Tab Take 1 tablet (10 mg total) by mouth daily.  90 tablet 1    Accu-Chek FastClix Lancets Does not apply Misc 1 Lancet by Finger stick route in the morning and 1 Lancet before bedtime. DX E11.8 non-insulin. 200 each 2    lisinopril 10 MG Oral Tab Take 1 tablet (10 mg total) by mouth daily. 90 tablet 0    Alcohol Swabs (DROPSAFE ALCOHOL PREP) 70 % Does not apply Pads USE TWICE DAILY WHEN CHECKING BLOOD SUGAR 200 each 1    Glucose Blood (ACCU-CHEK GUIDE) In Vitro Strip TEST BLOOD SUGAR TWICE DAILY 200 strip 2    torsemide 20 MG Oral Tab TAKE 1 TABLET EVERY DAY 90 tablet 0    ACCU-CHEK GUIDE ME w/Device Does not apply Kit USE AS DIRECTED 1 kit 0    Blood Glucose Monitoring Suppl (TRUE METRIX AIR GLUCOSE METER) w/Device Does not apply Kit 1 each 2 (two) times a day. Diagnosis code: E11.8 1 kit 0    Glucose Blood (RELION TRUE METRIX TEST STRIPS) In Vitro Strip 1 strip by Finger stick route 2 (two) times a day. Diagnosis code: E11.8 100 each 0       HEALTH MAINTENANCE  Immunization History   Administered Date(s) Administered    >=3 YRS TRI  MULTIDOSE VIAL (36899) FLU CLINIC 11/24/2014    Covid-19 Vaccine Pfizer 30 mcg/0.3 ml 02/01/2021, 02/22/2021, 10/06/2021    Covid-19 Vaccine Pfizer Bivalent 30mcg/0.3mL 10/29/2022    Covid-19 Vaccine Pfizer Darwin-Sucrose 30 mcg/0.3 ml 07/30/2022    FLU VAC High Dose 65 YRS & Older PRSV Free (70784) 09/07/2017, 09/13/2020, 09/20/2021, 10/17/2022, 09/18/2023    FLUAD High Dose 65 yr and older (93737) 09/10/2018, 09/28/2019    Fluvirin, 3 Years & >, Im 11/24/2014    Fluzone Vaccine Medicare () 09/07/2017    Influenza 10/02/2019    Moderna Covid-19 Vaccine 50mcg/0.5ml 12yrs+ (9952-1192) 09/30/2023    Pneumococcal (Prevnar 13) 06/20/2017    Pneumovax 23 09/10/2018   Deferred Date(s) Deferred    Pneumovax 23 08/16/2016       ALLERGIES AND DRUG REACTIONS  No Known Allergies    Family History   Problem Relation Age of Onset    Diabetes Mother     Stroke Mother     Cancer Sister         breast    Diabetes Sister     Obesity Sister      Diabetes Brother     Obesity Brother      Social History     Socioeconomic History    Marital status:    Tobacco Use    Smoking status: Former     Packs/day: 1     Types: Cigarettes     Quit date: 1987     Years since quittin.9    Smokeless tobacco: Never   Vaping Use    Vaping Use: Never used   Substance and Sexual Activity    Alcohol use: Not Currently    Drug use: Never   Other Topics Concern    Caffeine Concern No    Stress Concern No    Weight Concern No    Special Diet No    Exercise No    Seat Belt No       Review of Systems   All other systems reviewed and are negative.         Objective:      /84   Pulse 85   Temp 97 °F (36.1 °C) (Temporal)   Resp 16   Ht 5' 5\" (1.651 m)   Wt 264 lb 6 oz (119.9 kg)   SpO2 97%   BMI 43.99 kg/m²   Body mass index is 43.99 kg/m².    Physical Exam  Vitals reviewed.   Constitutional:       General: She is not in acute distress.     Appearance: She is not ill-appearing, toxic-appearing or diaphoretic.   HENT:      Head: Normocephalic and atraumatic.   Eyes:      General: No scleral icterus.        Right eye: No discharge.         Left eye: No discharge.      Conjunctiva/sclera: Conjunctivae normal.   Cardiovascular:      Rate and Rhythm: Normal rate.   Pulmonary:      Effort: Pulmonary effort is normal.   Abdominal:      General: There is no distension.   Musculoskeletal:      Cervical back: Neck supple.   Neurological:      Mental Status: She is alert and oriented to person, place, and time.            Assessment and Plan:      1. Well adult exam (Primary)  2. Osteopenia, unspecified location  3. Primary hypertension  4. Mixed hyperlipidemia  -     Pravastatin Sodium; Take 1 tablet (80 mg total) by mouth nightly.  Dispense: 90 tablet; Refill: 1  -     Comp Metabolic Panel (14)  -     Lipid Panel  5. Type 2 diabetes mellitus without complication, without long-term current use of insulin (HCC)  -     metFORMIN HCl; Take 1 tablet (500 mg total) by mouth  2 (two) times daily with meals.  Dispense: 180 tablet; Refill: 1  -     Empagliflozin; Take 1 tablet (10 mg total) by mouth daily.  Dispense: 90 tablet; Refill: 1  -     Comp Metabolic Panel (14)  -     Hemoglobin A1C  -     Microalb/Creat Ratio, Random Urine  6. Stage 3b chronic kidney disease (HCC)  Overview:  stable  Orders:  -     Empagliflozin; Take 1 tablet (10 mg total) by mouth daily.  Dispense: 90 tablet; Refill: 1  -     Comp Metabolic Panel (14)  7. Chronic congestive heart failure, unspecified heart failure type (HCC)  8. Vitamin D deficiency  Overview:  Controlled    Return in about 3 months (around 6/22/2024).    Medicare Wellness Visit  - no new concerns  - mammogram ordered 7/2023 and pending  - DEXA scan 2/2020 showed osteopenia. Will plan on follow-up scan in 5 years from previous  - [facility]'s updated    HTN  - well controlled  - continue lisinopril     DM2  - A1C 7.6; increased from previous of 7.1  - TSH WNL  - CKD has progressed to stage 3b  - CBC WNL  - recommended reducing metformin dose to 500 mg twice daily (renal dosing)  - recommended initiation of jardiance for DM2 and CKD; R/B/A of this medication were reviewed with pt  - reminded pt to follow up with nephrology and cardiology  - lab orders to be completed in 3 months were provided in advance today    Vitamin D deficiency  - vitamin D level WNL with current regimen of vitamin D 10,000 units weekly  - continue vitamin D supplementation with 10,000 units weekly    Patient verbalized understanding of assessment and recommendations. All questions and concerns were addressed.    Electronically signed by Oneida James MD

## 2024-03-27 NOTE — TELEPHONE ENCOUNTER
Name from pharmacy: TORSEMIDE 20 MG Tablet          Will file in chart as: TORSEMIDE 20 MG Oral Tab    Sig: TAKE 1 TABLET EVERY DAY    Disp: 90 tablet    Refills: 3    Start: 3/24/2024    Class: Normal    Non-formulary    Last ordered: 6 months ago (9/25/2023) by Yung Wood MD    Last refill: 1/11/2024    Rx #: [id number]    Hypertension Medications Protocol Juoknu0703/24/2024 12:42 AM   Protocol Details EGFRCR or GFRNAA > 50    CMP or BMP in past 12 months    Last BP reading less than 140/90    In person appointment or virtual visit in the past 12 mos or appointment in next 3 mos      To be filled at: [facility] - [facility] [address] [facility] [phone], [phone]     Future Appointments   Date Time Provider Department Center   6/7/2024  9:00 AM Oneida James MD [facility] [address]     LOV: cpx: 3/22/24  Last r/f: 9/25/23 # 90 0 r/fs  Labs: 3/9/24     RTC: yearly    Please advise

## 2024-03-28 RX ORDER — TORSEMIDE 20 MG/1
TABLET ORAL
Qty: 90 TABLET | Refills: 0 | Status: SHIPPED | OUTPATIENT
Start: 2024-03-28

## 2024-04-01 ENCOUNTER — TELEPHONE (OUTPATIENT)
Dept: FAMILY MEDICINE CLINIC | Facility: CLINIC | Age: 74
End: 2024-04-01

## 2024-04-01 NOTE — TELEPHONE ENCOUNTER
Dr. James  Verification of Chronic Condition  Pt sent form via [employer].  Emailed to Forms Department on 4-1-24.  No form fee collected.

## 2024-04-02 NOTE — TELEPHONE ENCOUNTER
Verification of Chronic Condition form received via Email, missing MARC, sending MCM for Auth Completion logged for processing

## 2024-04-04 DIAGNOSIS — E11.8 CONTROLLED DIABETES MELLITUS TYPE 2 WITH COMPLICATIONS, UNSPECIFIED WHETHER LONG TERM INSULIN USE (HCC): ICD-10-CM

## 2024-04-05 RX ORDER — BLOOD-GLUCOSE METER
1 EACH MISCELLANEOUS AS DIRECTED
Qty: 1 KIT | Refills: 0 | Status: SHIPPED | OUTPATIENT
Start: 2024-04-05

## 2024-04-05 NOTE — TELEPHONE ENCOUNTER
Disp Refills Start End    ACCU-CHEK GUIDE ME w/Device Does not apply Kit (Discontinued) 1 kit 0 6/14/2023 4/5/2024    Sig: USE AS DIRECTED    Sent to pharmacy as: Accu-Chek Guide Me w/Device Kit    E-Prescribing Status: Receipt confirmed by pharmacy (6/14/2023 12:33 PM CDT)        Diabetic Supplies Protocol Oyadmi0004/04/2024 12:14 AM   Protocol Details In person appointment or virtual visit in the past 12 mos or appointment in next 3 mos     Rx sent per protocol

## 2024-04-11 NOTE — TELEPHONE ENCOUNTER
Please try to avoid signing forms in the corner as it is not visible when printing and forms are not accepted this way. Thank you!     Dr. James     *The ACKNOWLEDGE button has been moved to the top right ribbon*    Please sign off on form if you agree to: Chronic Condition  (place your signature on the first page only)    -From your Inbasket, Highlight the patient and click Chart   -Double click the 4/1/24 Forms Completion telephone encounter  -Scroll down to the Media section   -Click the blue Hyperlink: Cronic Condition  Dr. James 4/11/24  -Click Acknowledge located in the top right ribbon/menu   -Drag the mouse into the blank space of the document and a + sign will appear. Left click to   electronically sign the document.     Thank you,      Bin PARTIDA

## 2024-04-12 NOTE — TELEPHONE ENCOUNTER
Chronic Condition completed and signed by provider. LVM informing pt form completed and uploaded to  - No MARC

## 2024-04-14 DIAGNOSIS — E11.8 CONTROLLED TYPE 2 DIABETES MELLITUS WITH COMPLICATION, WITHOUT LONG-TERM CURRENT USE OF INSULIN (HCC): Primary | ICD-10-CM

## 2024-04-15 RX ORDER — LANCETS
1 EACH MISCELLANEOUS 2 TIMES DAILY
Qty: 200 EACH | Refills: 3 | Status: SHIPPED | OUTPATIENT
Start: 2024-04-15

## 2024-04-18 ENCOUNTER — PATIENT MESSAGE (OUTPATIENT)
Dept: FAMILY MEDICINE CLINIC | Facility: CLINIC | Age: 74
End: 2024-04-18

## 2024-04-18 NOTE — TELEPHONE ENCOUNTER
From: Huyen Renner  To: Oneida James  Sent: 4/18/2024 6:44 AM CDT  Subject: Chronic Condition (VCC) Form    Can you please fax completed form to [email or web address] at [phone] or scan the completed form to [email or web address]. These are the completed forms you sent to me in My Chart, can you please forward them to the fax or email address I provided in this message.   Thank You,  Huyen Renner

## 2024-04-19 NOTE — TELEPHONE ENCOUNTER
Pt called, gave instructions on how to get form [facility] since we weren't able to fax due to no valid MARC on file. Pt expressed understanding.

## 2024-04-21 DIAGNOSIS — I10 PRIMARY HYPERTENSION: ICD-10-CM

## 2024-04-22 RX ORDER — LISINOPRIL 10 MG/1
10 TABLET ORAL DAILY
Qty: 90 TABLET | Refills: 1 | Status: SHIPPED | OUTPATIENT
Start: 2024-04-22

## 2024-04-22 NOTE — TELEPHONE ENCOUNTER
Requested Renewals     Name from pharmacy: LISINOPRIL 10 MG Tablet         Will file in chart as: LISINOPRIL 10 MG Oral Tab    Sig: TAKE 1 TABLET EVERY DAY    Disp: 90 tablet    Refills: 3    Start: 4/21/2024    Class: Normal    Non-formulary For: Primary hypertension    Last ordered: 2 months ago (2/7/2024) by Oneida James MD    Last refill: 2/9/2024    Rx #: [id number]    Hypertension Medications Protocol Ghtenw5204/21/2024 04:07 AM   Protocol Details EGFRCR or GFRNAA > 50    CMP or BMP in past 12 months    Last BP reading less than 140/90    In person appointment or virtual visit in the past 12 mos or appointment in next 3 mos             Future Appointments   Date Time Provider Department Center   6/7/2024  9:00 AM Oneida James MD [facility] [address]     LOV: 3/22/24 for wellness supervisit  RTC: 3 months  Labs done 3/9/24    -medication pended for review.

## 2024-05-07 ENCOUNTER — PATIENT MESSAGE (OUTPATIENT)
Dept: FAMILY MEDICINE CLINIC | Facility: CLINIC | Age: 74
End: 2024-05-07

## 2024-05-07 ENCOUNTER — TELEPHONE (OUTPATIENT)
Dept: FAMILY MEDICINE CLINIC | Facility: CLINIC | Age: 74
End: 2024-05-07

## 2024-05-07 NOTE — TELEPHONE ENCOUNTER
Linda wants to verify her chronic condition so she can remain with insurance.   Referral noted. Met with unit staff; patient assessed. Met away from patient with daughter/POA Lisa and son Ravindra. Reviewed goals of care which are for continuing to try treatment for agitation at least through the weekend and to hopefully have patient qualify for SNF at [facility]. They are familiar with [facility] services; reviewed information for NH and HSB GIP. Although patient is still verbal, appears to be appropriate for hospice otherwise. This nurse will follow up on Monday 8/14/17.   Thank you for this referral.  Ruth Benitez RN  [facility]  [phone]

## 2024-05-08 DIAGNOSIS — E11.9 TYPE 2 DIABETES MELLITUS WITHOUT COMPLICATION, WITHOUT LONG-TERM CURRENT USE OF INSULIN (HCC): ICD-10-CM

## 2024-05-08 DIAGNOSIS — E78.2 MIXED HYPERLIPIDEMIA: ICD-10-CM

## 2024-05-08 NOTE — TELEPHONE ENCOUNTER
Spoke to patient regarding her form. Patient states that the copy of the form that we uploaded via [employer] is very dark/blurry & will not be accepted by her employer.    Spoke with Elena CASEY about the situation.    Patient will drop off original copies of the forms at the providers office for completion.

## 2024-05-08 NOTE — TELEPHONE ENCOUNTER
From: Huyen Renner  To: Leticiaсергейkiara James  Sent: 5/7/2024 5:52 PM CDT  Subject: Chronic Condition (VCC) Form    We had sent a chronic condition form we needed filled out for our insurance co ([employer]) we have the filled out form in my chart messages sent to us by Bin CORONADO in a Patient Msg Attachment, we tried to forward this on to our insurance co and they cannot read it. Can the original form that was filled out be mailed to us so we can forward it on to our insurance or can another copy of the attachment be messaged to us. The copy that was sent to us in my   chart is dark and not readable.    Thank You  Huyen Renner

## 2024-05-09 RX ORDER — PRAVASTATIN SODIUM 80 MG/1
80 TABLET ORAL NIGHTLY
Qty: 90 TABLET | Refills: 1 | OUTPATIENT
Start: 2024-05-09 | End: 2024-08-07

## 2024-05-09 NOTE — TELEPHONE ENCOUNTER
Requesting             Disp Refills Start End   Pravastatin Sodium 80 MG Oral Tab 90 tablet 1 3/22/2024 6/20/2024   Sig - Route: Take 1 tablet (80 mg total) by mouth nightly. - Oral   Sent to pharmacy as: Pravastatin Sodium 80 MG Oral Tablet (PRAVACHOL)        Disp Refills Start End    metFORMIN 500 MG Oral Tab 180 tablet 1 3/22/2024 6/20/2024    Sig - Route: Take 1 tablet (500 mg total) by mouth 2 (two) times daily with meals. - Oral    Sent to pharmacy as: metFORMIN HCl 500 MG Oral Tablet (Glucophage)      LOV: 3/22/2024 for wellness visit  RTC: Return in about 3 months (around 6/22/2024).     Last Relevant Labs: 3/2024    Filled:     Dispensed Written Strength Quantity Refills Days Supply Provider Pharmacy    pravastatin 80 mg tablet 03/22/2024  80 MG 90 tablet  90 Oneida James MD [facility]...       Dispensed Written Strength Quantity Refills Days Supply Provider Pharmacy    metformin 500 mg tablet 03/22/2024  500  tablet  90 Oneida James MD [facility] Mail D...       Future Appointments   Date Time Provider Department Center   6/7/2024  9:00 AM Oneida James MD [facility] [address]     Cholesterol Medication Protocol Tsgvbs1505/08/2024 09:25 AM   Protocol Details ALT < 80    ALT resulted within past year    Lipid panel within past 12 months    In person appointment or virtual visit in the past 12 mos or appointment in next 3 mos       Diabetes Medication Protocol Pvcenn9705/08/2024 09:25 AM   Protocol Details Last A1C < 7.5 and within past 6 months    EGFRCR or GFRNAA > 50    In person appointment or virtual visit in the past 6 mos or appointment in next 3 mos    Microalbumin procedure in past 12 months or taking ACE/ARB    GFR in the past 12 months        MyChart message patient for rx denied, informed to contact pharmacy services since there are refills on the prescription    Rx denied, too soon for refills

## 2024-05-11 ENCOUNTER — PATIENT MESSAGE (OUTPATIENT)
Dept: FAMILY MEDICINE CLINIC | Facility: CLINIC | Age: 74
End: 2024-05-11

## 2024-05-11 DIAGNOSIS — E11.9 TYPE 2 DIABETES MELLITUS WITHOUT COMPLICATION, WITHOUT LONG-TERM CURRENT USE OF INSULIN (HCC): Primary | ICD-10-CM

## 2024-05-11 DIAGNOSIS — E78.2 MIXED HYPERLIPIDEMIA: ICD-10-CM

## 2024-05-11 DIAGNOSIS — N18.30 STAGE 3 CHRONIC KIDNEY DISEASE, UNSPECIFIED WHETHER STAGE 3A OR 3B CKD (HCC): ICD-10-CM

## 2024-05-12 DIAGNOSIS — E11.8 CONTROLLED DIABETES MELLITUS TYPE 2 WITH COMPLICATIONS, UNSPECIFIED WHETHER LONG TERM INSULIN USE (HCC): ICD-10-CM

## 2024-05-13 RX ORDER — ISOPROPYL ALCOHOL 70 ML/100ML
SWAB TOPICAL
Qty: 100 EACH | Refills: 0 | Status: SHIPPED | OUTPATIENT
Start: 2024-05-13

## 2024-05-13 NOTE — TELEPHONE ENCOUNTER
From: Huyen Renner  To: Oneida James  Sent: 5/11/2024 8:11 AM CDT  Subject: Lab Orders    Please send my lab orders for my A1c testing to [facility] so I can get my blood work done before my appointment in June. Will it be a fasting blood test.    Thank You,  Huyen Renner

## 2024-05-29 ENCOUNTER — HOSPITAL ENCOUNTER (EMERGENCY)
Facility: HOSPITAL | Age: 74
Discharge: LEFT BEFORE DISCHARGE | End: 2024-05-29
Payer: MEDICARE

## 2024-05-29 VITALS
TEMPERATURE: 99 F | HEIGHT: 65 IN | HEART RATE: 83 BPM | WEIGHT: 259 LBS | OXYGEN SATURATION: 96 % | SYSTOLIC BLOOD PRESSURE: 121 MMHG | DIASTOLIC BLOOD PRESSURE: 63 MMHG | RESPIRATION RATE: 16 BRPM | BODY MASS INDEX: 43.15 KG/M2

## 2024-05-29 LAB
ALBUMIN SERPL-MCNC: 3 G/DL (ref 3.4–5)
ALBUMIN/GLOB SERPL: 0.6 {RATIO} (ref 1–2)
ALP LIVER SERPL-CCNC: 76 U/L
ALT SERPL-CCNC: 23 U/L
ANION GAP SERPL CALC-SCNC: 9 MMOL/L (ref 0–18)
AST SERPL-CCNC: 11 U/L (ref 15–37)
BASOPHILS # BLD AUTO: 0.03 X10(3) UL (ref 0–0.2)
BASOPHILS NFR BLD AUTO: 0.3 %
BILIRUB SERPL-MCNC: 0.4 MG/DL (ref 0.1–2)
BILIRUB UR QL STRIP.AUTO: NEGATIVE
BUN BLD-MCNC: 34 MG/DL (ref 9–23)
CALCIUM BLD-MCNC: 8.9 MG/DL (ref 8.5–10.1)
CHLORIDE SERPL-SCNC: 100 MMOL/L (ref 98–112)
CLARITY UR REFRACT.AUTO: CLEAR
CO2 SERPL-SCNC: 26 MMOL/L (ref 21–32)
CREAT BLD-MCNC: 1.92 MG/DL
EGFRCR SERPLBLD CKD-EPI 2021: 27 ML/MIN/1.73M2 (ref 60–?)
EOSINOPHIL # BLD AUTO: 0.05 X10(3) UL (ref 0–0.7)
EOSINOPHIL NFR BLD AUTO: 0.5 %
ERYTHROCYTE [DISTWIDTH] IN BLOOD BY AUTOMATED COUNT: 12.6 %
GLOBULIN PLAS-MCNC: 4.7 G/DL (ref 2.8–4.4)
GLUCOSE BLD-MCNC: 225 MG/DL (ref 70–99)
GLUCOSE UR STRIP.AUTO-MCNC: >1000 MG/DL
HCT VFR BLD AUTO: 37.2 %
HGB BLD-MCNC: 12.2 G/DL
IMM GRANULOCYTES # BLD AUTO: 0.06 X10(3) UL (ref 0–1)
IMM GRANULOCYTES NFR BLD: 0.6 %
LEUKOCYTE ESTERASE UR QL STRIP.AUTO: 250
LIPASE SERPL-CCNC: 54 U/L (ref 13–75)
LYMPHOCYTES # BLD AUTO: 1.39 X10(3) UL (ref 1–4)
LYMPHOCYTES NFR BLD AUTO: 13.1 %
MCH RBC QN AUTO: 30.3 PG (ref 26–34)
MCHC RBC AUTO-ENTMCNC: 32.8 G/DL (ref 31–37)
MCV RBC AUTO: 92.3 FL
MONOCYTES # BLD AUTO: 0.85 X10(3) UL (ref 0.1–1)
MONOCYTES NFR BLD AUTO: 8 %
NEUTROPHILS # BLD AUTO: 8.2 X10 (3) UL (ref 1.5–7.7)
NEUTROPHILS # BLD AUTO: 8.2 X10(3) UL (ref 1.5–7.7)
NEUTROPHILS NFR BLD AUTO: 77.5 %
NITRITE UR QL STRIP.AUTO: NEGATIVE
OSMOLALITY SERPL CALC.SUM OF ELEC: 295 MOSM/KG (ref 275–295)
PH UR STRIP.AUTO: 5 [PH] (ref 5–8)
PLATELET # BLD AUTO: 223 10(3)UL (ref 150–450)
POTASSIUM SERPL-SCNC: 4.1 MMOL/L (ref 3.5–5.1)
PROT SERPL-MCNC: 7.7 G/DL (ref 6.4–8.2)
PROT UR STRIP.AUTO-MCNC: NEGATIVE MG/DL
RBC # BLD AUTO: 4.03 X10(6)UL
RBC UR QL AUTO: NEGATIVE
SODIUM SERPL-SCNC: 135 MMOL/L (ref 136–145)
SP GR UR STRIP.AUTO: 1.01 (ref 1–1.03)
UROBILINOGEN UR STRIP.AUTO-MCNC: NORMAL MG/DL
WBC # BLD AUTO: 10.6 X10(3) UL (ref 4–11)

## 2024-05-29 PROCEDURE — 83690 ASSAY OF LIPASE: CPT | Performed by: EMERGENCY MEDICINE

## 2024-05-29 PROCEDURE — 83690 ASSAY OF LIPASE: CPT

## 2024-05-29 PROCEDURE — 85025 COMPLETE CBC W/AUTO DIFF WBC: CPT

## 2024-05-29 PROCEDURE — 87086 URINE CULTURE/COLONY COUNT: CPT | Performed by: EMERGENCY MEDICINE

## 2024-05-29 PROCEDURE — 85025 COMPLETE CBC W/AUTO DIFF WBC: CPT | Performed by: EMERGENCY MEDICINE

## 2024-05-29 PROCEDURE — 81001 URINALYSIS AUTO W/SCOPE: CPT | Performed by: EMERGENCY MEDICINE

## 2024-05-29 PROCEDURE — 80053 COMPREHEN METABOLIC PANEL: CPT | Performed by: EMERGENCY MEDICINE

## 2024-05-29 PROCEDURE — 80053 COMPREHEN METABOLIC PANEL: CPT

## 2024-05-29 NOTE — ED QUICK NOTES
Pt and  not in room. This RN called pt's and 's phone and left a voicemail. No pt belongings in room, gown laying on ED cart.

## 2024-06-02 LAB
ALBUMIN/GLOBULIN RATIO: 1.4 (CALC) (ref 1–2.5)
ALBUMIN: 3.9 G/DL (ref 3.6–5.1)
ALKALINE PHOSPHATASE: 56 U/L (ref 37–153)
ALT: 16 U/L (ref 6–29)
AST: 14 U/L (ref 10–35)
BILIRUBIN, TOTAL: 0.3 MG/DL (ref 0.2–1.2)
BUN/CREATININE RATIO: 22 (CALC) (ref 6–22)
BUN: 42 MG/DL (ref 7–25)
CALCIUM: 9.3 MG/DL (ref 8.6–10.4)
CARBON DIOXIDE: 29 MMOL/L (ref 20–32)
CHLORIDE: 100 MMOL/L (ref 98–110)
CHOL/HDLC RATIO: 4 (CALC)
CHOLESTEROL, TOTAL: 135 MG/DL
CREATININE: 1.87 MG/DL (ref 0.6–1)
EGFR: 28 ML/MIN/1.73M2
GLOBULIN: 2.7 G/DL (CALC) (ref 1.9–3.7)
GLUCOSE: 174 MG/DL (ref 65–99)
HDL CHOLESTEROL: 34 MG/DL
HEMOGLOBIN A1C: 7.8 % OF TOTAL HGB
LDL-CHOLESTEROL: 65 MG/DL (CALC)
NON-HDL CHOLESTEROL: 101 MG/DL (CALC)
POTASSIUM: 4.9 MMOL/L (ref 3.5–5.3)
PROTEIN, TOTAL: 6.6 G/DL (ref 6.1–8.1)
SODIUM: 141 MMOL/L (ref 135–146)
TRIGLYCERIDES: 273 MG/DL

## 2024-06-07 ENCOUNTER — OFFICE VISIT (OUTPATIENT)
Dept: FAMILY MEDICINE CLINIC | Facility: CLINIC | Age: 74
End: 2024-06-07
Payer: MEDICARE

## 2024-06-07 VITALS
HEART RATE: 64 BPM | TEMPERATURE: 97 F | DIASTOLIC BLOOD PRESSURE: 50 MMHG | BODY MASS INDEX: 44.03 KG/M2 | RESPIRATION RATE: 16 BRPM | WEIGHT: 264.25 LBS | HEIGHT: 65 IN | OXYGEN SATURATION: 98 % | SYSTOLIC BLOOD PRESSURE: 114 MMHG

## 2024-06-07 DIAGNOSIS — E11.22 TYPE 2 DIABETES MELLITUS WITH STAGE 4 CHRONIC KIDNEY DISEASE, WITHOUT LONG-TERM CURRENT USE OF INSULIN (HCC): Primary | ICD-10-CM

## 2024-06-07 DIAGNOSIS — E78.2 MIXED HYPERLIPIDEMIA: ICD-10-CM

## 2024-06-07 DIAGNOSIS — I10 PRIMARY HYPERTENSION: ICD-10-CM

## 2024-06-07 DIAGNOSIS — N18.4 TYPE 2 DIABETES MELLITUS WITH STAGE 4 CHRONIC KIDNEY DISEASE, WITHOUT LONG-TERM CURRENT USE OF INSULIN (HCC): Primary | ICD-10-CM

## 2024-06-07 DIAGNOSIS — N18.4 CKD (CHRONIC KIDNEY DISEASE) STAGE 4, GFR 15-29 ML/MIN (HCC): ICD-10-CM

## 2024-06-07 DIAGNOSIS — I50.9 CHRONIC CONGESTIVE HEART FAILURE, UNSPECIFIED HEART FAILURE TYPE (HCC): ICD-10-CM

## 2024-06-07 PROCEDURE — 3074F SYST BP LT 130 MM HG: CPT | Performed by: STUDENT IN AN ORGANIZED HEALTH CARE EDUCATION/TRAINING PROGRAM

## 2024-06-07 PROCEDURE — 1159F MED LIST DOCD IN RCRD: CPT | Performed by: STUDENT IN AN ORGANIZED HEALTH CARE EDUCATION/TRAINING PROGRAM

## 2024-06-07 PROCEDURE — 3051F HG A1C>EQUAL 7.0%<8.0%: CPT | Performed by: STUDENT IN AN ORGANIZED HEALTH CARE EDUCATION/TRAINING PROGRAM

## 2024-06-07 PROCEDURE — 99214 OFFICE O/P EST MOD 30 MIN: CPT | Performed by: STUDENT IN AN ORGANIZED HEALTH CARE EDUCATION/TRAINING PROGRAM

## 2024-06-07 PROCEDURE — 3008F BODY MASS INDEX DOCD: CPT | Performed by: STUDENT IN AN ORGANIZED HEALTH CARE EDUCATION/TRAINING PROGRAM

## 2024-06-07 PROCEDURE — 1160F RVW MEDS BY RX/DR IN RCRD: CPT | Performed by: STUDENT IN AN ORGANIZED HEALTH CARE EDUCATION/TRAINING PROGRAM

## 2024-06-07 PROCEDURE — 3078F DIAST BP <80 MM HG: CPT | Performed by: STUDENT IN AN ORGANIZED HEALTH CARE EDUCATION/TRAINING PROGRAM

## 2024-06-07 PROCEDURE — 3072F LOW RISK FOR RETINOPATHY: CPT | Performed by: STUDENT IN AN ORGANIZED HEALTH CARE EDUCATION/TRAINING PROGRAM

## 2024-06-07 RX ORDER — GLIPIZIDE 2.5 MG/1
2.5 TABLET, EXTENDED RELEASE ORAL
Qty: 90 TABLET | Refills: 0 | Status: SHIPPED | OUTPATIENT
Start: 2024-06-07

## 2024-06-07 NOTE — PROGRESS NOTES
Subjective:      Chief Complaint   Patient presents with    Diabetes     F/up - labs done 24 - patient had DM eye exam done 24.     HISTORY OF PRESENT ILLNESS  Diabetes      HPI obtained per patient report.  Huyen Renner is a pleasant 73 year old female presenting for a diabetic follow-up.   She has been feeling well since her LOV.   She had a recent diabetic eye exam which showed no signs of retinopathy.    PAST PATIENT HISTORY  Past Medical History:    Acute congestive heart failure (HCC)    Acute diastolic heart failure (HCC)    Acute renal failure, unspecified acute renal failure type (HCC)    JENNIFER (acute kidney injury) (HCC)    ATN (acute tubular necrosis) (HCC)    Bronchopneumonia, organism unspecified    Congestive heart disease (HCC)    Diabetes (HCC)    Essential hypertension    Hearing impairment    High blood pressure    High cholesterol    Hyperkalemia    Hyperlipidemia    Hyponatremia    Laboratory examination ordered as part of a routine general medical examination    Obesity    Osteopenia    Pure hypercholesterolemia    Sleep apnea    Thrombocytopenia (HCC)    Visual impairment    Reading glasses     Past Surgical History:   Procedure Laterality Date    Cholecystectomy      Colonoscopy      Colonoscopy N/A 2017    Procedure: COLONOSCOPY;  Surgeon: Mayo Sewell MD;  Location: [facility] ENDOSCOPY    Colonoscopy  2023    Colonoscopy N/A 2018    Procedure: COLONOSCOPY;  Surgeon: Mayo Sewell MD;  Location: [facility] ENDOSCOPY    Colonoscopy N/A 2023    Procedure: COLONOSCOPY;  Surgeon: Mayo Sewell MD;  Location: [facility] ENDOSCOPY    Hysterectomy  2000    partial hysteectomy          Tonsillectomy         CURRENT MEDICATIONS  Outpatient Medications Marked as Taking for the 24 encounter (Office Visit) with Oneida James MD   Medication Sig Dispense Refill    empagliflozin 10 MG Oral Tab Take 1 tablet (10 mg total) by mouth daily. 90 tablet 0    glipiZIDE ER 2.5 MG  Oral Tablet 24 Hr Take 1 tablet (2.5 mg total) by mouth daily with breakfast. 90 tablet 0    Alcohol Swabs (DROPSAFE ALCOHOL PREP) 70 % Does not apply Pads USE AS DIRECTED TWICE DAILY WHEN CHECKING BLOOD SUGAR 100 each 0    lisinopril 10 MG Oral Tab TAKE 1 TABLET EVERY DAY 90 tablet 1    ACCU-CHEK SOFTCLIX LANCETS Does not apply Misc TEST BLOOD SUGAR TWO TIMES DAILY 200 each 3    Blood Glucose Monitoring Suppl (ACCU-CHEK GUIDE ME) w/Device Does not apply Kit Take 1 Bottle by mouth As Directed. Controlled diabetes mellitus type 2 DX:  E11.8 1 kit 0    torsemide 20 MG Oral Tab TAKE 1 TABLET EVERY DAY 90 tablet 0    Pravastatin Sodium 80 MG Oral Tab Take 1 tablet (80 mg total) by mouth nightly. 90 tablet 1    Glucose Blood (ACCU-CHEK GUIDE) In Vitro Strip TEST BLOOD SUGAR TWICE DAILY 200 strip 2    Blood Glucose Monitoring Suppl (TRUE METRIX AIR GLUCOSE METER) w/Device Does not apply Kit 1 each 2 (two) times a day. Diagnosis code: E11.8 1 kit 0    Glucose Blood (RELION TRUE METRIX TEST STRIPS) In Vitro Strip 1 strip by Finger stick route 2 (two) times a day. Diagnosis code: E11.8 100 each 0       HEALTH MAINTENANCE  Immunization History   Administered Date(s) Administered    >=3 YRS TRI  MULTIDOSE VIAL (76022) FLU CLINIC 11/24/2014    Covid-19 Vaccine Pfizer 30 mcg/0.3 ml 02/01/2021, 02/22/2021, 10/06/2021    Covid-19 Vaccine Pfizer Bivalent 30mcg/0.3mL 10/29/2022    Covid-19 Vaccine Pfizer Darwin-Sucrose 30 mcg/0.3 ml 07/30/2022    FLU VAC High Dose 65 YRS & Older PRSV Free (36279) 09/07/2017, 09/13/2020, 09/20/2021, 10/17/2022, 09/18/2023    FLUAD High Dose 65 yr and older (80353) 09/10/2018, 09/28/2019    Fluvirin, 3 Years & >, Im 11/24/2014    Fluzone Vaccine Medicare () 09/07/2017    Influenza 10/02/2019    Moderna Covid-19 Vaccine 50mcg/0.5ml 12yrs+ (0757-9484) 09/30/2023    Pneumococcal (Prevnar 13) 06/20/2017    Pneumovax 23 09/10/2018   Deferred Date(s) Deferred    Pneumovax 23 08/16/2016       ALLERGIES  AND DRUG REACTIONS  No Known Allergies    Family History   Problem Relation Age of Onset    Diabetes Mother     Stroke Mother     Cancer Sister         breast    Diabetes Sister     Obesity Sister     Diabetes Brother     Obesity Brother      Social History     Socioeconomic History    Marital status:    Tobacco Use    Smoking status: Former     Current packs/day: 0.00     Types: Cigarettes     Quit date: 1987     Years since quittin.1    Smokeless tobacco: Never   Vaping Use    Vaping status: Never Used   Substance and Sexual Activity    Alcohol use: Not Currently    Drug use: Never   Other Topics Concern    Caffeine Concern No    Stress Concern No    Weight Concern No    Special Diet No    Exercise No    Seat Belt No     Social Determinants of Health     Physical Activity: Sufficiently Active (8/10/2020)    [facility] [employer] [employer]    Exercise Vital Sign     Days of Exercise per Week: 7 days     Minutes of Exercise per Session: 30 min       Review of Systems   All other systems reviewed and are negative.         Objective:      /50   Pulse 64   Temp 97.2 °F (36.2 °C) (Temporal)   Resp 16   Ht 5' 5\" (1.651 m)   Wt 264 lb 4 oz (119.9 kg)   SpO2 98%   BMI 43.97 kg/m²   Body mass index is 43.97 kg/m².    Physical Exam  Vitals reviewed.   Constitutional:       General: She is not in acute distress.     Appearance: She is not ill-appearing, toxic-appearing or diaphoretic.   HENT:      Head: Normocephalic and atraumatic.   Eyes:      General: No scleral icterus.        Right eye: No discharge.         Left eye: No discharge.      Conjunctiva/sclera: Conjunctivae normal.   Cardiovascular:      Rate and Rhythm: Normal rate and regular rhythm.      Heart sounds: Normal heart sounds.   Pulmonary:      Effort: Pulmonary effort is normal.      Breath sounds: Normal breath sounds.   Abdominal:      General: There is no distension.   Musculoskeletal:      Cervical  back: Neck supple.      Right lower leg: Edema (trace pitting edema to ankle) present.      Left lower leg: Edema (trace pitting edema to ankle) present.   Neurological:      Mental Status: She is alert and oriented to person, place, and time.            Assessment and Plan:      1. Type 2 diabetes mellitus with stage 4 chronic kidney disease, without long-term current use of insulin ([facility]) (Primary)  -     Empagliflozin; Take 1 tablet (10 mg total) by mouth daily.  Dispense: 90 tablet; Refill: 0  -     glipiZIDE ER; Take 1 tablet (2.5 mg total) by mouth daily with breakfast.  Dispense: 90 tablet; Refill: 0  -     Hemoglobin A1C; Future; Expected date: 09/07/2024  -     Hemoglobin A1C  2. CKD (chronic kidney disease) stage 4, GFR 15-29 ml/min ([facility])  -     Empagliflozin; Take 1 tablet (10 mg total) by mouth daily.  Dispense: 90 tablet; Refill: 0  -     Comp Metabolic Panel (14); Future; Expected date: 09/07/2024  -     Comp Metabolic Panel (14)  3. Chronic congestive heart failure, unspecified heart failure type (HCC)  4. Primary hypertension  5. Mixed hyperlipidemia    Return in about 3 months (around 9/7/2024) for follow-up.    DM2  - uncontrolled  - A1C 7.8; further increased from previous   - CKD has progressed to stage 4. I discussed with the patient that her renal function has been rapidly decreasing over the past year. We discussed that there is also a possibility of worsening heart failure which may be contributing to her worsening kidney function via cardiorenal syndrome. I strongly recommended that she see nephrology and cardiology, but patient refused. We discussed that, without treatment intervention from these specialists, her kidney function will likely further decline to renal failure which will require dialysis. She refuses an echocardiogram to monitor her CHF and refuses to follow-up with cardiology, although we discussed that there are signs that her heart function may be declining which may be  fatal. She seems to be in denial about the reality of her declining condition, as she states that she believes her \"heart is fine\" and that her kidney function will start improving spontaneously as it has in the past  - recommended discontinuation of metformin and starting glipizide as prescribed  - continue jardiance   - lab orders to be completed in 3 months were provided in advance today    HTN  - taking lisinopril  - her diastolic BP is low today. She is asymptomatic. If her BP continues to remain well within goal, we will plan to reduce her lisinopril dose during her next OV    HLD  - she is taking pravastatin  - we discussed that her lipid panel shows worsening results since her previous, but our options for treatment are limited currently due to her kidney function    Patient verbalized understanding of assessment and recommendations. All questions and concerns were addressed.    Electronically signed by Oneida James MD

## 2024-06-08 ENCOUNTER — PATIENT MESSAGE (OUTPATIENT)
Dept: FAMILY MEDICINE CLINIC | Facility: CLINIC | Age: 74
End: 2024-06-08

## 2024-06-10 RX ORDER — TORSEMIDE 20 MG/1
TABLET ORAL
Qty: 90 TABLET | Refills: 0 | Status: SHIPPED | OUTPATIENT
Start: 2024-06-10

## 2024-06-10 NOTE — TELEPHONE ENCOUNTER
From: Huyen Renner  To: Oneida James  Sent: 6/8/2024 7:47 AM CDT  Subject: mammogram    Can you please change the date in my chart to show my yearly mammogram is not due until September of 2024.   Thank You  Huyen Renner

## 2024-06-27 DIAGNOSIS — E11.8 CONTROLLED DIABETES MELLITUS TYPE 2 WITH COMPLICATIONS, UNSPECIFIED WHETHER LONG TERM INSULIN USE (HCC): ICD-10-CM

## 2024-06-27 RX ORDER — ISOPROPYL ALCOHOL 70 ML/100ML
SWAB TOPICAL
Qty: 200 EACH | Refills: 1 | Status: SHIPPED | OUTPATIENT
Start: 2024-06-27

## 2024-06-27 NOTE — TELEPHONE ENCOUNTER
Requested Renewals     Name from pharmacy: DROPSAFE ALCOHOL PREP PADS 70 % Pad         Will file in chart as: DROPSAFE ALCOHOL PREP 70 % Does not apply Pads    Sig: USE AS DIRECTED TWICE DAILY WHEN CHECKING BLOOD SUGAR    Disp: Not specified (Pharmacy requested: 100 Not Specified)    Refills: 0 (Pharmacy requested: Not specified)    Start: 6/27/2024    Class: Normal    Non-formulary For: Controlled diabetes mellitus type 2 with complications, unspecified whether long term insulin use (HCC)          Future Appointments   Date Time Provider Department Center   9/7/2024  9:00 AM Oneida James MD [facility] [address]     LOV: 6/7/24  RTC: 3 months    -RX pended for review

## 2024-07-25 DIAGNOSIS — E11.8 CONTROLLED DIABETES MELLITUS TYPE 2 WITH COMPLICATIONS, UNSPECIFIED WHETHER LONG TERM INSULIN USE (HCC): ICD-10-CM

## 2024-07-31 RX ORDER — BLOOD SUGAR DIAGNOSTIC
1 STRIP MISCELLANEOUS 2 TIMES DAILY
Qty: 200 STRIP | Refills: 3 | Status: SHIPPED | OUTPATIENT
Start: 2024-07-31

## 2024-07-31 NOTE — TELEPHONE ENCOUNTER
Name from pharmacy: ACCU-CHEK GUIDE   Strip         Will file in chart as: ACCU-CHEK GUIDE In Vitro Strip    Sig: TEST BLOOD SUGAR TWO TIMES DAILY    Disp: 200 strip    Refills: 3    Start: 7/25/2024    Class: Normal    Non-formulary For: Controlled diabetes mellitus type 2 with complications, unspecified whether long term insulin use (HCC)    Last ordered: 7 months ago (12/15/2023) by Yung Wood MD    Last refill: 5/15/2024    Rx #: [id number]    Diabetic Supplies Protocol Dmvzir8707/25/2024 02:12 AM   Protocol Details In person appointment or virtual visit in the past 12 mos or appointment in next 3 mos      To be filled at: [facility] - [facility] [address] [facility] [phone], [phone]

## 2024-08-05 ENCOUNTER — PATIENT MESSAGE (OUTPATIENT)
Dept: FAMILY MEDICINE CLINIC | Facility: CLINIC | Age: 74
End: 2024-08-05

## 2024-08-05 DIAGNOSIS — N18.4 TYPE 2 DIABETES MELLITUS WITH STAGE 4 CHRONIC KIDNEY DISEASE, WITHOUT LONG-TERM CURRENT USE OF INSULIN (HCC): ICD-10-CM

## 2024-08-05 DIAGNOSIS — E11.22 TYPE 2 DIABETES MELLITUS WITH STAGE 4 CHRONIC KIDNEY DISEASE, WITHOUT LONG-TERM CURRENT USE OF INSULIN (HCC): ICD-10-CM

## 2024-08-06 RX ORDER — GLIPIZIDE 2.5 MG/1
2.5 TABLET, EXTENDED RELEASE ORAL
Qty: 90 TABLET | Refills: 0 | Status: SHIPPED | OUTPATIENT
Start: 2024-08-06

## 2024-08-06 NOTE — TELEPHONE ENCOUNTER
Requested Renewals     glipiZIDE ER 2.5 MG Oral Tablet 24 Hr         Sig: Take 1 tablet (2.5 mg total) by mouth daily with breakfast.    Disp: 90 tablet    Refills: 0    Start: 8/5/2024    Class: Normal    Non-formulary For: Type 2 diabetes mellitus with stage 4 chronic kidney disease, without long-term current use of insulin (HCC)    Last ordered: 2 months ago (6/7/2024) by Oneida James MD    Diabetes Medication Protocol Lghwgj0308/05/2024 04:53 PM   Protocol Details Last A1C < 7.5 and within past 6 months    EGFRCR or GFRNAA > 50    In person appointment or virtual visit in the past 6 mos or appointment in next 3 mos    Microalbumin procedure in past 12 months or taking ACE/ARB    GFR in the past 12 months             Future Appointments   Date Time Provider Department Center   9/7/2024  9:00 AM Oneida James MD [facility] [address]     LOV: 6/7/24  RTC: 3 months  Labs done 6/1/24  Labs ordered for upcoming visit.    -medication pended for review.

## 2024-08-06 NOTE — TELEPHONE ENCOUNTER
From: Huyen Renner  To: Oneida James  Sent: 8/5/2024 4:52 PM CDT  Subject: Lab Orders    Have my lab orders been sent to [facility] for my A1c  Thanks,  Huyen Renner

## 2024-08-08 RX ORDER — PRAVASTATIN SODIUM 80 MG/1
80 TABLET ORAL NIGHTLY
Qty: 90 TABLET | Refills: 0 | Status: SHIPPED | OUTPATIENT
Start: 2024-08-08

## 2024-08-11 LAB
ALBUMIN/GLOBULIN RATIO: 1.4 (CALC) (ref 1–2.5)
ALBUMIN: 3.8 G/DL (ref 3.6–5.1)
ALKALINE PHOSPHATASE: 63 U/L (ref 37–153)
ALT: 13 U/L (ref 6–29)
AST: 11 U/L (ref 10–35)
BILIRUBIN, TOTAL: 0.2 MG/DL (ref 0.2–1.2)
BUN/CREATININE RATIO: 27 (CALC) (ref 6–22)
BUN: 40 MG/DL (ref 7–25)
CALCIUM: 9.5 MG/DL (ref 8.6–10.4)
CARBON DIOXIDE: 28 MMOL/L (ref 20–32)
CHLORIDE: 103 MMOL/L (ref 98–110)
CREATININE: 1.48 MG/DL (ref 0.6–1)
EGFR: 37 ML/MIN/1.73M2
GLOBULIN: 2.8 G/DL (CALC) (ref 1.9–3.7)
GLUCOSE: 179 MG/DL (ref 65–99)
HEMOGLOBIN A1C: 8.1 % OF TOTAL HGB
POTASSIUM: 5.2 MMOL/L (ref 3.5–5.3)
PROTEIN, TOTAL: 6.6 G/DL (ref 6.1–8.1)
SODIUM: 141 MMOL/L (ref 135–146)

## 2024-08-13 ENCOUNTER — OFFICE VISIT (OUTPATIENT)
Dept: FAMILY MEDICINE CLINIC | Facility: CLINIC | Age: 74
End: 2024-08-13
Payer: MEDICARE

## 2024-08-13 VITALS
HEART RATE: 88 BPM | TEMPERATURE: 98 F | DIASTOLIC BLOOD PRESSURE: 86 MMHG | OXYGEN SATURATION: 99 % | BODY MASS INDEX: 42.82 KG/M2 | WEIGHT: 257 LBS | SYSTOLIC BLOOD PRESSURE: 138 MMHG | HEIGHT: 65 IN | RESPIRATION RATE: 18 BRPM

## 2024-08-13 DIAGNOSIS — K57.92 DIVERTICULITIS: Primary | ICD-10-CM

## 2024-08-13 PROCEDURE — 1160F RVW MEDS BY RX/DR IN RCRD: CPT | Performed by: STUDENT IN AN ORGANIZED HEALTH CARE EDUCATION/TRAINING PROGRAM

## 2024-08-13 PROCEDURE — 3079F DIAST BP 80-89 MM HG: CPT | Performed by: STUDENT IN AN ORGANIZED HEALTH CARE EDUCATION/TRAINING PROGRAM

## 2024-08-13 PROCEDURE — 1159F MED LIST DOCD IN RCRD: CPT | Performed by: STUDENT IN AN ORGANIZED HEALTH CARE EDUCATION/TRAINING PROGRAM

## 2024-08-13 PROCEDURE — 3008F BODY MASS INDEX DOCD: CPT | Performed by: STUDENT IN AN ORGANIZED HEALTH CARE EDUCATION/TRAINING PROGRAM

## 2024-08-13 PROCEDURE — 3052F HG A1C>EQUAL 8.0%<EQUAL 9.0%: CPT | Performed by: STUDENT IN AN ORGANIZED HEALTH CARE EDUCATION/TRAINING PROGRAM

## 2024-08-13 PROCEDURE — 99213 OFFICE O/P EST LOW 20 MIN: CPT | Performed by: STUDENT IN AN ORGANIZED HEALTH CARE EDUCATION/TRAINING PROGRAM

## 2024-08-13 PROCEDURE — 3075F SYST BP GE 130 - 139MM HG: CPT | Performed by: STUDENT IN AN ORGANIZED HEALTH CARE EDUCATION/TRAINING PROGRAM

## 2024-08-13 RX ORDER — METRONIDAZOLE 500 MG/1
500 TABLET ORAL 3 TIMES DAILY
Qty: 10 TABLET | Refills: 0 | Status: SHIPPED | OUTPATIENT
Start: 2024-08-13

## 2024-08-13 RX ORDER — GLIPIZIDE 5 MG/1
5 TABLET, FILM COATED, EXTENDED RELEASE ORAL DAILY
Qty: 90 TABLET | Refills: 0 | Status: CANCELLED | OUTPATIENT
Start: 2024-08-13

## 2024-08-13 RX ORDER — CIPROFLOXACIN 250 MG/1
250 TABLET, FILM COATED ORAL 2 TIMES DAILY
Qty: 20 TABLET | Refills: 0 | Status: SHIPPED | OUTPATIENT
Start: 2024-08-13 | End: 2024-08-23

## 2024-08-13 NOTE — PROGRESS NOTES
Subjective:      Chief Complaint   Patient presents with    Abdominal Pain     Patient states she has been having stomach pain and cramping for the past month. Patient states she has a history of diverticulitis. Patient states she isn't having diarrhea or nausea.     HISTORY OF PRESENT ILLNESS  HPI  HPI obtained per patient report.  Huyen Renner is a pleasant 73 year old female presenting with abdominal pain.   She reports LLQ pain intermittently for 1 month. She states that the pain is cramping and can be severe at times. She denies other associated symptoms including but not limited to fever, chills, nausea, vomiting, constipation, diarrhea. She states that she had similar symptoms when she was diagnosed with diverticulitis in the past.     PAST PATIENT HISTORY  Past Medical History:    Acute congestive heart failure (HCC)    Acute diastolic heart failure (HCC)    Acute renal failure, unspecified acute renal failure type (HCC)    JENNIFER (acute kidney injury) (HCC)    ATN (acute tubular necrosis) (HCC)    Bronchopneumonia, organism unspecified    Congestive heart disease (HCC)    Diabetes (HCC)    Essential hypertension    Hearing impairment    High blood pressure    High cholesterol    Hyperkalemia    Hyperlipidemia    Hyponatremia    Laboratory examination ordered as part of a routine general medical examination    Obesity    Osteopenia    Pure hypercholesterolemia    Sleep apnea    Thrombocytopenia (HCC)    Visual impairment    Reading glasses     Past Surgical History:   Procedure Laterality Date    Cholecystectomy      Colonoscopy  2005    Colonoscopy N/A 05/01/2017    Procedure: COLONOSCOPY;  Surgeon: Mayo Sewell MD;  Location: [facility] ENDOSCOPY    Colonoscopy  11/2023    Colonoscopy N/A 12/27/2018    Procedure: COLONOSCOPY;  Surgeon: Mayo Sewell MD;  Location: [facility] ENDOSCOPY    Colonoscopy N/A 11/22/2023    Procedure: COLONOSCOPY;  Surgeon: Mayo Sewell MD;  Location: [facility] ENDOSCOPY    Hysterectomy   2000    partial hysteectomy          Tonsillectomy         CURRENT MEDICATIONS  Outpatient Medications Marked as Taking for the 24 encounter (Office Visit) with Oneida James MD   Medication Sig Dispense Refill    metRONIDAZOLE (FLAGYL) 500 MG Oral Tab Take 1 tablet (500 mg total) by mouth 3 (three) times daily. 10 tablet 0    ciprofloxacin 250 MG Oral Tab Take 1 tablet (250 mg total) by mouth 2 (two) times daily for 10 days. 20 tablet 0    Pravastatin Sodium 80 MG Oral Tab TAKE 1 TABLET EVERY NIGHT 90 tablet 0    glipiZIDE ER 2.5 MG Oral Tablet 24 Hr Take 1 tablet (2.5 mg total) by mouth daily with breakfast. 90 tablet 0    ACCU-CHEK GUIDE In Vitro Strip TEST BLOOD SUGAR TWO TIMES DAILY 200 strip 3    Alcohol Swabs (DROPSAFE ALCOHOL PREP) 70 % Does not apply Pads USE AS DIRECTED TWICE DAILY WHEN CHECKING BLOOD SUGAR 200 each 1    torsemide 20 MG Oral Tab TAKE 1 TABLET EVERY DAY 90 tablet 0    empagliflozin 10 MG Oral Tab Take 1 tablet (10 mg total) by mouth daily. 90 tablet 0    lisinopril 10 MG Oral Tab TAKE 1 TABLET EVERY DAY 90 tablet 1    ACCU-CHEK SOFTCLIX LANCETS Does not apply Misc TEST BLOOD SUGAR TWO TIMES DAILY 200 each 3    Blood Glucose Monitoring Suppl (ACCU-CHEK GUIDE ME) w/Device Does not apply Kit Take 1 Bottle by mouth As Directed. Controlled diabetes mellitus type 2 DX:  E11.8 1 kit 0    Blood Glucose Monitoring Suppl (TRUE METRIX AIR GLUCOSE METER) w/Device Does not apply Kit 1 each 2 (two) times a day. Diagnosis code: E11.8 1 kit 0    Glucose Blood (RELION TRUE METRIX TEST STRIPS) In Vitro Strip 1 strip by Finger stick route 2 (two) times a day. Diagnosis code: E11.8 100 each 0       HEALTH MAINTENANCE  Immunization History   Administered Date(s) Administered    >=3 YRS TRI  MULTIDOSE VIAL (36992) FLU CLINIC 2014    Covid-19 Vaccine Pfizer 30 mcg/0.3 ml 2021, 2021, 10/06/2021    Covid-19 Vaccine Pfizer Bivalent 30mcg/0.3mL 10/29/2022    Covid-19 Vaccine Pfizer  Darwin-Sucrose 30 mcg/0.3 ml 2022    FLU VAC High Dose 65 YRS & Older PRSV Free (58141) 2017, 2020, 2021, 10/17/2022, 2023    FLUAD High Dose 65 yr and older (74813) 09/10/2018, 2019    Fluvirin, 3 Years & >, Im 2014    Fluzone Vaccine Medicare () 2017    Influenza 10/02/2019    Moderna Covid-19 Vaccine 50mcg/0.5ml 12yrs+ (5532-2147) 2023    Pneumococcal (Prevnar 13) 2017    Pneumovax 23 09/10/2018   Deferred Date(s) Deferred    Pneumovax 23 2016       ALLERGIES AND DRUG REACTIONS  No Known Allergies    Family History   Problem Relation Age of Onset    Diabetes Mother     Stroke Mother     Cancer Sister         breast    Diabetes Sister     Obesity Sister     Diabetes Brother     Obesity Brother      Social History     Socioeconomic History    Marital status:    Tobacco Use    Smoking status: Former     Current packs/day: 0.00     Types: Cigarettes     Quit date: 1987     Years since quittin.3    Smokeless tobacco: Never   Vaping Use    Vaping status: Never Used   Substance and Sexual Activity    Alcohol use: Not Currently    Drug use: Never   Other Topics Concern    Caffeine Concern No    Stress Concern No    Weight Concern No    Special Diet No    Exercise No    Seat Belt No     Social Determinants of Health     Physical Activity: Sufficiently Active (8/10/2020)    Received from [facility], [email or web address]    Exercise Vital Sign     Days of Exercise per Week: 7 days     Minutes of Exercise per Session: 30 min       Review of Systems   Gastrointestinal:  Positive for abdominal pain.   All other systems reviewed and are negative.         Objective:      /86 (BP Location: Right arm, Patient Position: Sitting, Cuff Size: adult)   Pulse 88   Temp 97.7 °F (36.5 °C) (Temporal)   Resp 18   Ht 5' 5\" (1.651 m)   Wt 257 lb (116.6 kg)   SpO2 99%   BMI 42.77 kg/m²   Body mass index is 42.77 kg/m².    Physical  Exam  Vitals reviewed.   Constitutional:       General: She is not in acute distress.     Appearance: She is not ill-appearing, toxic-appearing or diaphoretic.   HENT:      Head: Normocephalic and atraumatic.   Cardiovascular:      Rate and Rhythm: Normal rate.   Pulmonary:      Effort: Pulmonary effort is normal.   Abdominal:      General: There is no distension.      Palpations: Abdomen is soft. There is no mass.      Tenderness: There is no abdominal tenderness. There is no guarding or rebound.   Musculoskeletal:      Cervical back: Neck supple.   Neurological:      Mental Status: She is alert.            Assessment and Plan:      1. Diverticulitis (Primary)  -     metroNIDAZOLE; Take 1 tablet (500 mg total) by mouth 3 (three) times daily.  Dispense: 10 tablet; Refill: 0  -     Ciprofloxacin HCl; Take 1 tablet (250 mg total) by mouth 2 (two) times daily for 10 days.  Dispense: 20 tablet; Refill: 0    No follow-ups on file.    - CT abdomen/pelvis 12/26/2019 showed sigmoid diverticulosis  - recommended flagyl and ciprofloxacin as prescribed   - if her symptoms do not improve within 3 days of starting antibiotic therapy, recommended calling for a CT abdomen/pelvis order    Patient verbalized understanding of assessment and recommendations. All questions and concerns were addressed.    Electronically signed by Oneida James MD

## 2024-08-22 DIAGNOSIS — Z12.31 VISIT FOR SCREENING MAMMOGRAM: Primary | ICD-10-CM

## 2024-08-22 RX ORDER — TORSEMIDE 20 MG/1
TABLET ORAL
Qty: 90 TABLET | Refills: 0 | Status: SHIPPED | OUTPATIENT
Start: 2024-08-22

## 2024-08-22 NOTE — TELEPHONE ENCOUNTER
Requesting Torsemide 20mg  LOV: 8/13/24 acute  RTC: prn  Last Relevant Labs: 8/10/24  Filled: 6/10/24 #90 with 0 refills    Future Appointments   Date Time Provider Department Center   9/7/2024  9:00 AM Oneida James MD [facility] [address] [facility] [address]       Hypertension Medications Protocol [id number]/22/2024 01:04 AM   Protocol Details EGFRCR or GFRNAA > 50    CMP or BMP in past 12 months    Last BP reading less than 140/90    In person appointment or virtual visit in the past 12 mos or appointment in next 3 mos        Rx sent per protocol

## 2024-08-24 ENCOUNTER — PATIENT MESSAGE (OUTPATIENT)
Dept: FAMILY MEDICINE CLINIC | Facility: CLINIC | Age: 74
End: 2024-08-24

## 2024-08-24 DIAGNOSIS — K57.92 DIVERTICULITIS: Primary | ICD-10-CM

## 2024-08-24 DIAGNOSIS — R10.32 LEFT LOWER QUADRANT ABDOMINAL PAIN: ICD-10-CM

## 2024-08-25 ENCOUNTER — PATIENT MESSAGE (OUTPATIENT)
Dept: FAMILY MEDICINE CLINIC | Facility: CLINIC | Age: 74
End: 2024-08-25

## 2024-08-25 DIAGNOSIS — K57.92 DIVERTICULITIS: ICD-10-CM

## 2024-08-26 ENCOUNTER — PATIENT MESSAGE (OUTPATIENT)
Dept: FAMILY MEDICINE CLINIC | Facility: CLINIC | Age: 74
End: 2024-08-26

## 2024-08-26 DIAGNOSIS — E11.22 TYPE 2 DIABETES MELLITUS WITH STAGE 4 CHRONIC KIDNEY DISEASE, WITHOUT LONG-TERM CURRENT USE OF INSULIN (HCC): ICD-10-CM

## 2024-08-26 DIAGNOSIS — N18.4 CKD (CHRONIC KIDNEY DISEASE) STAGE 4, GFR 15-29 ML/MIN (HCC): ICD-10-CM

## 2024-08-26 DIAGNOSIS — N18.4 TYPE 2 DIABETES MELLITUS WITH STAGE 4 CHRONIC KIDNEY DISEASE, WITHOUT LONG-TERM CURRENT USE OF INSULIN (HCC): ICD-10-CM

## 2024-08-26 NOTE — TELEPHONE ENCOUNTER
From: Huyen Renner  To: Oneida James  Sent: 8/25/2024 8:08 AM CDT  Subject: prescriptions    Please disregard my previous message. the 2 prescriptions you ordered for me for attacks of diverticulitis worked I had no attacks while taking this medication I am out of this medication and I had a severe attack this past friday and it lasted for 3 days. can you put in an order for this medication again

## 2024-08-26 NOTE — TELEPHONE ENCOUNTER
From: Huyen Renner  To: Oneida James  Sent: 8/24/2024 3:12 PM CDT  Subject: I still have severe pain in my lower left abdomen    The pain is so bad I can hardly walk or stand up. Can you put in an order for a test at [facility] I know you said about a CT scan. I need some type of test to diagnose this pain    Thank You,  Mahi Renner

## 2024-08-26 NOTE — TELEPHONE ENCOUNTER
From: Huyen Renner  To: Oneida James  Sent: 8/26/2024 8:44 AM CDT  Subject: Metamucil    Can I take over the counter Metamucil fiber suppliment i need to try and cure this pain     Thank You  Huyen Renner

## 2024-08-27 ENCOUNTER — APPOINTMENT (OUTPATIENT)
Dept: CT IMAGING | Facility: HOSPITAL | Age: 74
End: 2024-08-27
Attending: EMERGENCY MEDICINE
Payer: MEDICARE

## 2024-08-27 ENCOUNTER — HOSPITAL ENCOUNTER (EMERGENCY)
Facility: HOSPITAL | Age: 74
Discharge: HOME | End: 2024-08-27
Attending: EMERGENCY MEDICINE
Payer: MEDICARE

## 2024-08-27 VITALS
TEMPERATURE: 97 F | SYSTOLIC BLOOD PRESSURE: 143 MMHG | HEIGHT: 64 IN | HEART RATE: 59 BPM | RESPIRATION RATE: 12 BRPM | OXYGEN SATURATION: 100 % | WEIGHT: 255 LBS | BODY MASS INDEX: 43.54 KG/M2 | DIASTOLIC BLOOD PRESSURE: 48 MMHG

## 2024-08-27 DIAGNOSIS — N28.9 RENAL INSUFFICIENCY: ICD-10-CM

## 2024-08-27 DIAGNOSIS — K57.92 ACUTE DIVERTICULITIS: Primary | ICD-10-CM

## 2024-08-27 DIAGNOSIS — N39.0 ACUTE UTI: ICD-10-CM

## 2024-08-27 LAB
ALBUMIN SERPL-MCNC: 4.4 G/DL (ref 3.2–4.8)
ALBUMIN/GLOB SERPL: 1.5 {RATIO} (ref 1–2)
ALP LIVER SERPL-CCNC: 77 U/L
ALT SERPL-CCNC: 23 U/L
ANION GAP SERPL CALC-SCNC: 3 MMOL/L (ref 0–18)
AST SERPL-CCNC: 18 U/L (ref ?–34)
BASOPHILS # BLD AUTO: 0.03 X10(3) UL (ref 0–0.2)
BASOPHILS NFR BLD AUTO: 0.3 %
BILIRUB SERPL-MCNC: 0.3 MG/DL (ref 0.2–1.1)
BILIRUB UR QL STRIP.AUTO: NEGATIVE
BUN BLD-MCNC: 48 MG/DL (ref 9–23)
CALCIUM BLD-MCNC: 9.6 MG/DL (ref 8.7–10.4)
CHLORIDE SERPL-SCNC: 101 MMOL/L (ref 98–112)
CLARITY UR REFRACT.AUTO: CLEAR
CO2 SERPL-SCNC: 30 MMOL/L (ref 21–32)
COLOR UR AUTO: COLORLESS
CREAT BLD-MCNC: 1.96 MG/DL
EGFRCR SERPLBLD CKD-EPI 2021: 27 ML/MIN/1.73M2 (ref 60–?)
EOSINOPHIL # BLD AUTO: 0.12 X10(3) UL (ref 0–0.7)
EOSINOPHIL NFR BLD AUTO: 1.2 %
ERYTHROCYTE [DISTWIDTH] IN BLOOD BY AUTOMATED COUNT: 13.1 %
GLOBULIN PLAS-MCNC: 3 G/DL (ref 2–3.5)
GLUCOSE BLD-MCNC: 169 MG/DL (ref 70–99)
GLUCOSE UR STRIP.AUTO-MCNC: 500 MG/DL
HCT VFR BLD AUTO: 35.2 %
HGB BLD-MCNC: 11.7 G/DL
IMM GRANULOCYTES # BLD AUTO: 0.07 X10(3) UL (ref 0–1)
IMM GRANULOCYTES NFR BLD: 0.7 %
KETONES UR STRIP.AUTO-MCNC: NEGATIVE MG/DL
LEUKOCYTE ESTERASE UR QL STRIP.AUTO: 75
LIPASE SERPL-CCNC: 79 U/L (ref 12–53)
LYMPHOCYTES # BLD AUTO: 1.47 X10(3) UL (ref 1–4)
LYMPHOCYTES NFR BLD AUTO: 15.1 %
MCH RBC QN AUTO: 29.5 PG (ref 26–34)
MCHC RBC AUTO-ENTMCNC: 33.2 G/DL (ref 31–37)
MCV RBC AUTO: 88.7 FL
MONOCYTES # BLD AUTO: 0.71 X10(3) UL (ref 0.1–1)
MONOCYTES NFR BLD AUTO: 7.3 %
NEUTROPHILS # BLD AUTO: 7.33 X10 (3) UL (ref 1.5–7.7)
NEUTROPHILS # BLD AUTO: 7.33 X10(3) UL (ref 1.5–7.7)
NEUTROPHILS NFR BLD AUTO: 75.4 %
NITRITE UR QL STRIP.AUTO: NEGATIVE
OSMOLALITY SERPL CALC.SUM OF ELEC: 295 MOSM/KG (ref 275–295)
PH UR STRIP.AUTO: 5 [PH] (ref 5–8)
PLATELET # BLD AUTO: 248 10(3)UL (ref 150–450)
POTASSIUM SERPL-SCNC: 4.2 MMOL/L (ref 3.5–5.1)
PROT SERPL-MCNC: 7.4 G/DL (ref 5.7–8.2)
PROT UR STRIP.AUTO-MCNC: NEGATIVE MG/DL
RBC # BLD AUTO: 3.97 X10(6)UL
RBC UR QL AUTO: NEGATIVE
SODIUM SERPL-SCNC: 134 MMOL/L (ref 136–145)
SP GR UR STRIP.AUTO: 1.01 (ref 1–1.03)
UROBILINOGEN UR STRIP.AUTO-MCNC: NORMAL MG/DL
WBC # BLD AUTO: 9.7 X10(3) UL (ref 4–11)

## 2024-08-27 PROCEDURE — 96375 TX/PRO/DX INJ NEW DRUG ADDON: CPT

## 2024-08-27 PROCEDURE — 99284 EMERGENCY DEPT VISIT MOD MDM: CPT

## 2024-08-27 PROCEDURE — 99285 EMERGENCY DEPT VISIT HI MDM: CPT

## 2024-08-27 PROCEDURE — 80053 COMPREHEN METABOLIC PANEL: CPT | Performed by: EMERGENCY MEDICINE

## 2024-08-27 PROCEDURE — 81001 URINALYSIS AUTO W/SCOPE: CPT | Performed by: EMERGENCY MEDICINE

## 2024-08-27 PROCEDURE — 83690 ASSAY OF LIPASE: CPT | Performed by: EMERGENCY MEDICINE

## 2024-08-27 PROCEDURE — 74176 CT ABD & PELVIS W/O CONTRAST: CPT | Performed by: EMERGENCY MEDICINE

## 2024-08-27 PROCEDURE — 87086 URINE CULTURE/COLONY COUNT: CPT | Performed by: EMERGENCY MEDICINE

## 2024-08-27 PROCEDURE — 85025 COMPLETE CBC W/AUTO DIFF WBC: CPT | Performed by: EMERGENCY MEDICINE

## 2024-08-27 PROCEDURE — 96365 THER/PROPH/DIAG IV INF INIT: CPT

## 2024-08-27 PROCEDURE — 96361 HYDRATE IV INFUSION ADD-ON: CPT

## 2024-08-27 RX ORDER — KETOROLAC TROMETHAMINE 15 MG/ML
15 INJECTION, SOLUTION INTRAMUSCULAR; INTRAVENOUS ONCE
Status: COMPLETED | OUTPATIENT
Start: 2024-08-27 | End: 2024-08-27

## 2024-08-27 RX ORDER — POLYETHYLENE GLYCOL 3350 17 G/17G
17 POWDER, FOR SOLUTION ORAL DAILY PRN
Qty: 30 EACH | Refills: 0 | Status: SHIPPED | OUTPATIENT
Start: 2024-08-27 | End: 2024-09-26

## 2024-08-27 RX ORDER — METRONIDAZOLE 500 MG/1
500 TABLET ORAL 3 TIMES DAILY
Qty: 10 TABLET | Refills: 0 | OUTPATIENT
Start: 2024-08-27

## 2024-08-27 RX ORDER — LEVOFLOXACIN 500 MG/1
500 TABLET, FILM COATED ORAL DAILY
Qty: 10 TABLET | Refills: 0 | Status: SHIPPED | OUTPATIENT
Start: 2024-08-27 | End: 2024-09-06

## 2024-08-27 RX ORDER — LEVOFLOXACIN 750 MG/1
750 TABLET, FILM COATED ORAL ONCE
Status: COMPLETED | OUTPATIENT
Start: 2024-08-27 | End: 2024-08-27

## 2024-08-27 RX ORDER — METRONIDAZOLE 500 MG/1
500 TABLET ORAL 3 TIMES DAILY
Qty: 30 TABLET | Refills: 0 | Status: SHIPPED | OUTPATIENT
Start: 2024-08-27 | End: 2024-09-06

## 2024-08-27 RX ORDER — ONDANSETRON 2 MG/ML
4 INJECTION INTRAMUSCULAR; INTRAVENOUS ONCE
Status: COMPLETED | OUTPATIENT
Start: 2024-08-27 | End: 2024-08-27

## 2024-08-27 RX ORDER — MORPHINE SULFATE 4 MG/ML
4 INJECTION, SOLUTION INTRAMUSCULAR; INTRAVENOUS ONCE
Status: COMPLETED | OUTPATIENT
Start: 2024-08-27 | End: 2024-08-27

## 2024-08-27 RX ORDER — HYDROCODONE BITARTRATE AND ACETAMINOPHEN 5; 325 MG/1; MG/1
1 TABLET ORAL EVERY 6 HOURS PRN
Qty: 20 TABLET | Refills: 0 | Status: SHIPPED | OUTPATIENT
Start: 2024-08-27

## 2024-08-27 RX ORDER — METRONIDAZOLE 500 MG/1
500 TABLET ORAL ONCE
Status: COMPLETED | OUTPATIENT
Start: 2024-08-27 | End: 2024-08-27

## 2024-08-27 NOTE — ED INITIAL ASSESSMENT (HPI)
Patient reports constant lower left abd pain and right lower back pain since Friday. Nausea no vomiting. Denies urinary symptoms. No fevers.    What Type Of Note Output Would You Prefer (Optional)?: Bullet Format What Is The Reason For Today's Visit?: Full Body Skin Examination What Is The Reason For Today's Visit? (Being Monitored For X): concerning skin lesions on an annual basis Additional History: new patient

## 2024-08-27 NOTE — TELEPHONE ENCOUNTER
Requested Renewals     Name from pharmacy: JARDIANCE 10 MG Tablet         Will file in chart as: JARDIANCE 10 MG Oral Tab    Sig: TAKE 1 TABLET EVERY DAY    Disp: 90 tablet    Refills: 3    Start: 8/26/2024    Class: Normal    Non-formulary For: Type 2 diabetes mellitus with stage 4 chronic kidney disease, without long-term current use of insulin (HCC); CKD (chronic kidney disease) stage 4, GFR 15-29 ml/min ([facility])    Last ordered: 2 months ago (6/7/2024) by Oneida James MD    Last refill: 6/11/2024    Rx #: [id number]    Diabetes Medication Protocol [id number]/26/2024 10:27 AM   Protocol Details Last A1C < 7.5 and within past 6 months    EGFRCR or GFRNAA > 50    In person appointment or virtual visit in the past 6 mos or appointment in next 3 mos    Microalbumin procedure in past 12 months or taking ACE/ARB    GFR in the past 12 months             Future Appointments   Date Time Provider Department Center   9/7/2024  9:00 AM Oneida James MD [facility] [address]     LOV: 8/13/24  ER visit today 8/27/24    HGBA1C:    Lab Results   Component Value Date    A1C 8.1 (H) 08/10/2024    A1C 7.8 (H) 06/01/2024    A1C 7.6 (H) 03/09/2024     (H) 06/03/2017     -medication pended for review.

## 2024-08-27 NOTE — ED PROVIDER NOTES
Patient Seen in: [facility] Emergency Department      History     Chief Complaint   Patient presents with    Abdomen/Flank Pain     Stated Complaint:     Subjective:   Patient is a 73-year-old female presents emergency room for evaluation of left lower quadrant pain.  Pain started yesterday.  Patient reports no fevers at home.  She has had prior diagnosis of  diverticulosis in the past on colonoscopy but patient has never had diverticulitis.  CT scan however does show diverticulitis tonight with no sign of perforation or abscess.  Patient is greatly relieved to know this as she was concerned that she had tumors.  Patient also had some right sided flank pain her urinalysis does show slight UTI but is contaminated.  Patient reports that her primary doctor had put her on oral medication but only for Flagyl for 3 to 5 days.  She had not been on a full 10-day course.  Patient had taken the 10-day course of the other prescription she could not remember the name of but did take that a 5-day course of Flagyl but that would explain why her symptoms have not fully resolved.    The history is provided by the patient and the spouse.           Objective:   Past Medical History:    Acute congestive heart failure (HCC)    Acute diastolic heart failure (HCC)    Acute renal failure, unspecified acute renal failure type (HCC)    JENNIFER (acute kidney injury) (HCC)    ATN (acute tubular necrosis) (HCC)    Bronchopneumonia, organism unspecified    Congestive heart disease (HCC)    Diabetes (HCC)    Essential hypertension    Hearing impairment    High blood pressure    High cholesterol    Hyperkalemia    Hyperlipidemia    Hyponatremia    Laboratory examination ordered as part of a routine general medical examination    Obesity    Osteopenia    Pure hypercholesterolemia    Sleep apnea    Thrombocytopenia (HCC)    Visual impairment    Reading glasses              Past Surgical History:   Procedure Laterality Date    Cholecystectomy       Colonoscopy  2005    Colonoscopy N/A 2017    Procedure: COLONOSCOPY;  Surgeon: Mayo Sewell MD;  Location: [facility] ENDOSCOPY    Colonoscopy  2023    Colonoscopy N/A 2018    Procedure: COLONOSCOPY;  Surgeon: Mayo Sewell MD;  Location: [facility] ENDOSCOPY    Colonoscopy N/A 2023    Procedure: COLONOSCOPY;  Surgeon: Mayo Sewell MD;  Location: [facility] ENDOSCOPY    Hysterectomy  2000    partial hysteectomy          Tonsillectomy                  Social History     Socioeconomic History    Marital status:    Tobacco Use    Smoking status: Former     Current packs/day: 0.00     Types: Cigarettes     Quit date: 1987     Years since quittin.3    Smokeless tobacco: Never   Vaping Use    Vaping status: Never Used   Substance and Sexual Activity    Alcohol use: Not Currently    Drug use: Never   Other Topics Concern    Caffeine Concern No    Stress Concern No    Weight Concern No    Special Diet No    Exercise No    Seat Belt No     Social Determinants of Health     Physical Activity: Sufficiently Active (8/10/2020)    [facility] [employer]    Exercise Vital Sign     Days of Exercise per Week: 7 days     Minutes of Exercise per Session: 30 min              Review of Systems   Constitutional:  Negative for fever.   Gastrointestinal:  Positive for abdominal pain and constipation. Negative for diarrhea and vomiting.   Genitourinary:  Positive for frequency. Negative for dysuria.       Positive for stated Chief Complaint: Abdomen/Flank Pain    Other systems are as noted in HPI.  Constitutional and vital signs reviewed.      All other systems reviewed and negative except as noted above.    Physical Exam     ED Triage Vitals [24 0224]   /80   Pulse 66   Resp 18   Temp 97.2 °F (36.2 °C)   Temp src    SpO2 97 %   O2 Device None (Room air)       Current Vitals:   Vital Signs  BP: 143/48  Pulse: 62  Resp: 13  Temp: 97.2 °F (36.2 °C)  MAP  (mmHg): 74    Oxygen Therapy  SpO2: 100 %  O2 Device: None (Room air)            Physical Exam  Vitals and nursing note reviewed.   Constitutional:       Appearance: She is well-developed. She is obese.   Neurological:      Mental Status: She is alert.               ED Course     Labs Reviewed   CBC WITH DIFFERENTIAL WITH PLATELET - Abnormal; Notable for the following components:       Result Value    HGB 11.7 (*)     All other components within normal limits   COMP METABOLIC PANEL (14) - Abnormal; Notable for the following components:    Glucose 169 (*)     Sodium 134 (*)     BUN 48 (*)     Creatinine 1.96 (*)     eGFR-Cr 27 (*)     All other components within normal limits   LIPASE - Abnormal; Notable for the following components:    Lipase 79 (*)     All other components within normal limits   URINALYSIS WITH CULTURE REFLEX - Abnormal; Notable for the following components:    Urine Color Colorless (*)     Glucose Urine 500 (*)     Leukocyte Esterase Urine 75 (*)     Bacteria Urine Rare (*)     Squamous Epi. Cells Few (*)     All other components within normal limits   URINE CULTURE, ROUTINE             CT scan shows evaluation is limited due to lack of intravenous contrast numerous colonic diverticuli findings compatible with acute sigmoid colonic diverticulitis with no definitive evidence of perforation the lack of contrast limits evaluation for presence of abscess.  Moderate bilateral perinephric stranding appears grossly similar to compared to prior study no evidence of hydroureteronephrosis or obstructing stone.         MDM      Social -negative tobacco, negative etoh, negative drugs  Family History-noncontributory  Past Medical History-diabetes, hyperkalemia, hyponatremia, renal failure, osteopenia, high cholesterol, thrombocytopenia, congestive heart disease, hypertension, high blood pressure, hyperlipidemia, obesity, sleep apnea    Differential diagnosis before testing included diverticulitis, UTI, bowel  obstruction, constipation, kidney stone    Co-morbidities that add to the complexity of management include: Obesity, chronic kidney disease unable to get contrast due to her chronic renal insufficiency    Testing ordered during this visit included ET scan patient labs urinalysis    Radiographic images  I personally reviewed the radiographs and my individual interpretation shows  diverticulitis  I also reviewed the official reports that showed CT scan shows evaluation is limited due to lack of intravenous contrast numerous colonic diverticuli findings compatible with acute sigmoid colonic diverticulitis with no definitive evidence of perforation the lack of contrast limits evaluation for presence of abscess.  Moderate bilateral perinephric stranding appears grossly similar to compared to prior study no evidence of hydroureteronephrosis or obstructing stone.    External chart review showed review of Care Everywhere in [email or web address] system shows patient has baseline acute kidney disease and baseline creatinine is at 1.87    History obtained by an independent source included from patient, family    Discussion of management with patient, family    Social determinants of health that affect care include patient had recently been treated for diverticulitis but did not have the full course of the Flagyl per patient      Medications Provided: Flagyl Levaquin [place] MiraLAX    Course of Events during Emergency Room Visit include 73-year-old female presents emergency room for evaluation of left lower quadrant pain.  Patient is found to have diverticulitis with no signs of perforation or abscess on CT.  Her creatinine is elevated but this is at her approximate baseline.  Her urinalysis does show leuk esterase but there is some contamination with squamous cells.  Given these findings we will start her on Flagyl and Levaquin for full 10-day course also along with Norco and will start on MiraLAX daily.  Patient to follow-up with primary care  physician and her GI specialist.          Disposition:      Discharge  I have discussed with the patient the results of test, differential diagnosis, treatment plan, warning signs and symptoms which should prompt immediate return.  They expressed understanding of these instructions and agrees to the following plan provided.  They were given written discharge instructions and agrees to return for any concerns and voiced understanding and all questions were answered.                                      Medical Decision Making      Disposition and Plan     Clinical Impression:  1. Acute diverticulitis    2. Renal insufficiency    3. Acute UTI         Disposition:  There is no disposition on file for this visit.  There is no disposition time on file for this visit.    Follow-up:  Oneida James MD  [address] [address] [place] [id number]  [phone]    Schedule an appointment as soon as possible for a visit            Medications Prescribed:  Current Discharge Medication List        START taking these medications    Details   !! metRONIDAZOLE 500 MG Oral Tab Take 1 tablet (500 mg total) by mouth 3 (three) times daily for 10 days.  Qty: 30 tablet, Refills: 0      levoFLOXacin 500 MG Oral Tab Take 1 tablet (500 mg total) by mouth daily for 10 days.  Qty: 10 tablet, Refills: 0      HYDROcodone-acetaminophen 5-325 MG Oral Tab Take 1 tablet by mouth every 6 (six) hours as needed for Pain. Do not drive or operate machinery within 8 hours of taking this medication  Qty: 20 tablet, Refills: 0    Associated Diagnoses: Acute diverticulitis      polyethylene glycol, PEG 3350, 17 g Oral Powd Pack Take 17 g by mouth daily as needed (constipation).  Qty: 30 each, Refills: 0       !! - Potential duplicate medications found. Please discuss with provider.

## 2024-08-28 ENCOUNTER — PATIENT OUTREACH (OUTPATIENT)
Dept: CASE MANAGEMENT | Age: 74
End: 2024-08-28

## 2024-08-28 NOTE — PROGRESS NOTES
Left message on mailbox for patient to call nurse care manager back for transitions of care call.  Nurse care  information included in message.

## 2024-08-29 RX ORDER — EMPAGLIFLOZIN 10 MG/1
10 TABLET, FILM COATED ORAL DAILY
Qty: 90 TABLET | Refills: 0 | Status: SHIPPED | OUTPATIENT
Start: 2024-08-29

## 2024-09-07 ENCOUNTER — OFFICE VISIT (OUTPATIENT)
Dept: FAMILY MEDICINE CLINIC | Facility: CLINIC | Age: 74
End: 2024-09-07
Payer: MEDICARE

## 2024-09-07 VITALS
OXYGEN SATURATION: 99 % | HEIGHT: 64 IN | HEART RATE: 66 BPM | TEMPERATURE: 97 F | BODY MASS INDEX: 44.58 KG/M2 | WEIGHT: 261.13 LBS | DIASTOLIC BLOOD PRESSURE: 70 MMHG | RESPIRATION RATE: 18 BRPM | SYSTOLIC BLOOD PRESSURE: 130 MMHG

## 2024-09-07 DIAGNOSIS — N18.4 TYPE 2 DIABETES MELLITUS WITH STAGE 4 CHRONIC KIDNEY DISEASE, WITHOUT LONG-TERM CURRENT USE OF INSULIN (HCC): Primary | ICD-10-CM

## 2024-09-07 DIAGNOSIS — E11.22 TYPE 2 DIABETES MELLITUS WITH STAGE 4 CHRONIC KIDNEY DISEASE, WITHOUT LONG-TERM CURRENT USE OF INSULIN (HCC): Primary | ICD-10-CM

## 2024-09-07 DIAGNOSIS — N18.4 CKD (CHRONIC KIDNEY DISEASE) STAGE 4, GFR 15-29 ML/MIN (HCC): ICD-10-CM

## 2024-09-07 PROCEDURE — 3078F DIAST BP <80 MM HG: CPT | Performed by: STUDENT IN AN ORGANIZED HEALTH CARE EDUCATION/TRAINING PROGRAM

## 2024-09-07 PROCEDURE — 1159F MED LIST DOCD IN RCRD: CPT | Performed by: STUDENT IN AN ORGANIZED HEALTH CARE EDUCATION/TRAINING PROGRAM

## 2024-09-07 PROCEDURE — 3008F BODY MASS INDEX DOCD: CPT | Performed by: STUDENT IN AN ORGANIZED HEALTH CARE EDUCATION/TRAINING PROGRAM

## 2024-09-07 PROCEDURE — 3075F SYST BP GE 130 - 139MM HG: CPT | Performed by: STUDENT IN AN ORGANIZED HEALTH CARE EDUCATION/TRAINING PROGRAM

## 2024-09-07 PROCEDURE — 99214 OFFICE O/P EST MOD 30 MIN: CPT | Performed by: STUDENT IN AN ORGANIZED HEALTH CARE EDUCATION/TRAINING PROGRAM

## 2024-09-07 PROCEDURE — 1160F RVW MEDS BY RX/DR IN RCRD: CPT | Performed by: STUDENT IN AN ORGANIZED HEALTH CARE EDUCATION/TRAINING PROGRAM

## 2024-09-07 RX ORDER — PYRITHIONE ZINC 1 G/ML
1 LOTION/SHAMPOO TOPICAL DAILY
COMMUNITY

## 2024-09-07 RX ORDER — GLIPIZIDE 5 MG/1
5 TABLET, FILM COATED, EXTENDED RELEASE ORAL
Qty: 90 TABLET | Refills: 0 | Status: SHIPPED | OUTPATIENT
Start: 2024-09-07 | End: 2024-12-06

## 2024-09-07 NOTE — PROGRESS NOTES
Subjective:      Chief Complaint   Patient presents with    Diabetes     3 month f/up - labs completed     HISTORY OF PRESENT ILLNESS  HPI  HPI obtained per patient report.  Huyen Renner is a pleasant 73 year old female presenting for a follow-up for her diabetes.   Her diverticulitis symptoms have resolved.   Her BG levels have been in the 110s-130 fasting more recently but were in the 170s-180s when she had diverticulitis.    PAST PATIENT HISTORY  Past Medical History:    Acute congestive heart failure (HCC)    Acute diastolic heart failure (HCC)    Acute renal failure, unspecified acute renal failure type (HCC)    JENNIFER (acute kidney injury) (HCC)    ATN (acute tubular necrosis) (HCC)    Bronchopneumonia, organism unspecified    Congestive heart disease (HCC)    Diabetes (HCC)    Essential hypertension    Hearing impairment    High blood pressure    High cholesterol    Hyperkalemia    Hyperlipidemia    Hyponatremia    Laboratory examination ordered as part of a routine general medical examination    Obesity    Osteopenia    Pure hypercholesterolemia    Sleep apnea    Thrombocytopenia (HCC)    Visual impairment    Reading glasses     Past Surgical History:   Procedure Laterality Date    Cholecystectomy      Colonoscopy      Colonoscopy N/A 2017    Procedure: COLONOSCOPY;  Surgeon: Mayo Sewell MD;  Location: [facility] ENDOSCOPY    Colonoscopy  2023    Colonoscopy N/A 2018    Procedure: COLONOSCOPY;  Surgeon: Mayo Sewell MD;  Location: [facility] ENDOSCOPY    Colonoscopy N/A 2023    Procedure: COLONOSCOPY;  Surgeon: Mayo Sewell MD;  Location: [facility] ENDOSCOPY    Hysterectomy  2000    partial hysteectomy          Tonsillectomy         CURRENT MEDICATIONS  Outpatient Medications Marked as Taking for the 24 encounter (Office Visit) with Oneida James MD   Medication Sig Dispense Refill    Metamucil Fiber Oral Chew Tab Chew 1 tablet by mouth daily. 1 gummy daily      glipiZIDE ER 5 MG  Oral Tablet 24 Hr Take 1 tablet (5 mg total) by mouth daily with breakfast. 90 tablet 0    JARDIANCE 10 MG Oral Tab TAKE 1 TABLET EVERY DAY 90 tablet 0    HYDROcodone-acetaminophen 5-325 MG Oral Tab Take 1 tablet by mouth every 6 (six) hours as needed for Pain. Do not drive or operate machinery within 8 hours of taking this medication 20 tablet 0    torsemide 20 MG Oral Tab TAKE 1 TABLET EVERY DAY 90 tablet 0    Pravastatin Sodium 80 MG Oral Tab TAKE 1 TABLET EVERY NIGHT 90 tablet 0    ACCU-CHEK GUIDE In Vitro Strip TEST BLOOD SUGAR TWO TIMES DAILY 200 strip 3    Alcohol Swabs (DROPSAFE ALCOHOL PREP) 70 % Does not apply Pads USE AS DIRECTED TWICE DAILY WHEN CHECKING BLOOD SUGAR 200 each 1    lisinopril 10 MG Oral Tab TAKE 1 TABLET EVERY DAY 90 tablet 1    ACCU-CHEK SOFTCLIX LANCETS Does not apply Misc TEST BLOOD SUGAR TWO TIMES DAILY 200 each 3    Blood Glucose Monitoring Suppl (ACCU-CHEK GUIDE ME) w/Device Does not apply Kit Take 1 Bottle by mouth As Directed. Controlled diabetes mellitus type 2 DX:  E11.8 1 kit 0    Blood Glucose Monitoring Suppl (TRUE METRIX AIR GLUCOSE METER) w/Device Does not apply Kit 1 each 2 (two) times a day. Diagnosis code: E11.8 1 kit 0    Glucose Blood (RELION TRUE METRIX TEST STRIPS) In Vitro Strip 1 strip by Finger stick route 2 (two) times a day. Diagnosis code: E11.8 100 each 0       HEALTH MAINTENANCE  Immunization History   Administered Date(s) Administered    >=3 YRS TRI  MULTIDOSE VIAL (83017) FLU CLINIC 11/24/2014    Covid-19 Vaccine Pfizer 30 mcg/0.3 ml 02/01/2021, 02/22/2021, 10/06/2021    Covid-19 Vaccine Pfizer Bivalent 30mcg/0.3mL 10/29/2022    Covid-19 Vaccine Pfizer Darwin-Sucrose 30 mcg/0.3 ml 07/30/2022    FLU VAC High Dose 65 YRS & Older PRSV Free (82783) 09/07/2017, 09/13/2020, 09/20/2021, 10/17/2022, 09/18/2023    FLUAD High Dose 65 yr and older (89003) 09/10/2018, 09/28/2019    Fluvirin, 3 Years & >, Im 11/24/2014    Fluzone Vaccine Medicare () 09/07/2017     Influenza 10/02/2019    Moderna Covid-19 Vaccine 50mcg/0.5ml 12yrs+ 2023    Pneumococcal (Prevnar 13) 2017    Pneumovax 23 09/10/2018   Deferred Date(s) Deferred    Pneumovax 23 2016       ALLERGIES AND DRUG REACTIONS  No Known Allergies    Family History   Problem Relation Age of Onset    Diabetes Mother     Stroke Mother     Cancer Sister         breast    Diabetes Sister     Obesity Sister     Diabetes Brother     Obesity Brother      Social History     Socioeconomic History    Marital status:    Tobacco Use    Smoking status: Former     Current packs/day: 0.00     Types: Cigarettes     Quit date: 1987     Years since quittin.3    Smokeless tobacco: Never   Vaping Use    Vaping status: Never Used   Substance and Sexual Activity    Alcohol use: Not Currently    Drug use: Never   Other Topics Concern    Caffeine Concern No    Stress Concern No    Weight Concern No    Special Diet No    Exercise No    Seat Belt No     Social Determinants of Health     Physical Activity: Sufficiently Active (8/10/2020)    Received from [employer] [employer]    Exercise Vital Sign     Days of Exercise per Week: 7 days     Minutes of Exercise per Session: 30 min       Review of Systems   All other systems reviewed and are negative.         Objective:      /70   Pulse 66   Temp 97.1 °F (36.2 °C) (Temporal)   Resp 18   Ht 5' 4\" (1.626 m)   Wt 261 lb 2 oz (118.4 kg)   SpO2 99%   BMI 44.82 kg/m²   Body mass index is 44.82 kg/m².    Physical Exam  Vitals reviewed.   Constitutional:       General: She is not in acute distress.     Appearance: She is not ill-appearing, toxic-appearing or diaphoretic.   HENT:      Head: Normocephalic and atraumatic.   Eyes:      General: No scleral icterus.        Right eye: No discharge.         Left eye: No discharge.   Cardiovascular:      Rate and Rhythm: Normal rate and regular rhythm.      Heart sounds: Normal heart sounds.    Pulmonary:      Effort: Pulmonary effort is normal.      Breath sounds: Normal breath sounds.   Abdominal:      General: There is no distension.      Palpations: Abdomen is soft.      Tenderness: There is no abdominal tenderness.   Musculoskeletal:      Cervical back: Neck supple.   Neurological:      Mental Status: She is alert and oriented to person, place, and time.            Assessment and Plan:      1. Type 2 diabetes mellitus with stage 4 chronic kidney disease, without long-term current use of insulin ([facility]) (Primary)  -     glipiZIDE ER; Take 1 tablet (5 mg total) by mouth daily with breakfast.  Dispense: 90 tablet; Refill: 0  -     Basic Metabolic Panel (8); Future; Expected date: 12/07/2024  -     Hemoglobin A1C; Future; Expected date: 12/07/2024  -     Basic Metabolic Panel (8)  -     Hemoglobin A1C  2. CKD (chronic kidney disease) stage 4, GFR 15-29 ml/min ([facility])  -     glipiZIDE ER; Take 1 tablet (5 mg total) by mouth daily with breakfast.  Dispense: 90 tablet; Refill: 0  -     Basic Metabolic Panel (8); Future; Expected date: 12/07/2024  -     Basic Metabolic Panel (8)    Return in about 3 months (around 12/7/2024) for follow-up.    DM2  - uncontrolled  - A1C 8.1; further increased from previous, but her recent diverticulitis episode contributed to temporarily increased BG levels  - recommended increasing her glipizide dose mildly from 2.5 mg daily to 5 mg daily  - continue jardiance   - lab orders to be completed in 3 months were provided in advance today    CKD4  - renal function stable since LOV    Patient verbalized understanding of assessment and recommendations. All questions and concerns were addressed.    Electronically signed by Oneida James MD

## 2024-09-10 ENCOUNTER — TELEPHONE (OUTPATIENT)
Dept: FAMILY MEDICINE CLINIC | Facility: CLINIC | Age: 74
End: 2024-09-10

## 2024-09-10 DIAGNOSIS — Z12.31 SCREENING MAMMOGRAM FOR BREAST CANCER: Primary | ICD-10-CM

## 2024-09-10 NOTE — TELEPHONE ENCOUNTER
Ordered placed.    Called Deanna to confirm fax number. No answer. Left message.    Waiting for return call. Then will fax.

## 2024-09-10 NOTE — TELEPHONE ENCOUNTER
Patient has mammogram scheduled for 9/21/24. Will need order for mammogram and well as authorization from insurance company. Fax order to [phone]. Will fax request to our office as well.

## 2024-09-10 NOTE — TELEPHONE ENCOUNTER
LM with Deanna that we do not do pre authorizations for mammograms. I'm assuming [facility] is out of her [employer] network and that is why you are requesting a pre-auth. Patient would need to go to an in network facility.  I left Deanna my direct phone number if she has any questions.

## 2024-09-11 DIAGNOSIS — I10 PRIMARY HYPERTENSION: ICD-10-CM

## 2024-09-11 RX ORDER — LISINOPRIL 10 MG/1
10 TABLET ORAL DAILY
Qty: 90 TABLET | Refills: 0 | Status: SHIPPED | OUTPATIENT
Start: 2024-09-11

## 2024-09-21 ENCOUNTER — HOSPITAL ENCOUNTER (OUTPATIENT)
Dept: MAMMOGRAPHY | Age: 74
Discharge: HOME | End: 2024-09-21
Attending: FAMILY MEDICINE

## 2024-09-21 DIAGNOSIS — Z12.31 VISIT FOR SCREENING MAMMOGRAM: ICD-10-CM

## 2024-09-21 PROCEDURE — 77067 SCR MAMMO BI INCL CAD: CPT

## 2024-09-23 ENCOUNTER — TELEPHONE (OUTPATIENT)
Dept: FAMILY MEDICINE CLINIC | Facility: CLINIC | Age: 74
End: 2024-09-23

## 2024-09-23 NOTE — TELEPHONE ENCOUNTER
Received mammogram results from [facility] dated 9/21/24.  Report has been abstracted.  HM updated.  Results placed on MD bin for review.

## 2024-09-24 ENCOUNTER — TELEPHONE (OUTPATIENT)
Dept: FAMILY MEDICINE CLINIC | Facility: CLINIC | Age: 74
End: 2024-09-24

## 2024-09-24 NOTE — TELEPHONE ENCOUNTER
Please let pt know her screening  mammogram result faxed from [facility] was benign. Next is recommended in 1 year

## 2024-10-08 ENCOUNTER — PATIENT MESSAGE (OUTPATIENT)
Dept: FAMILY MEDICINE CLINIC | Facility: CLINIC | Age: 74
End: 2024-10-08

## 2024-10-08 NOTE — TELEPHONE ENCOUNTER
From: Huyen Renner  To: Oneida James  Sent: 10/8/2024 7:01 AM CDT  Subject: uti    i have a uti and i have been drinking cranberry juice and a lot of water, i have had this for 2 days is there anything i can take to get rid of this?     Thank You,   Huyen Renner

## 2024-10-21 RX ORDER — PRAVASTATIN SODIUM 80 MG/1
80 TABLET ORAL NIGHTLY
Qty: 90 TABLET | Refills: 0 | Status: SHIPPED | OUTPATIENT
Start: 2024-10-21

## 2024-10-21 NOTE — TELEPHONE ENCOUNTER
Requested Renewals     Name from pharmacy: Pravastatin Sodium Oral Tablet 80 MG         Will file in chart as: PRAVASTATIN SODIUM 80 MG Oral Tab    Sig: TAKE 1 TABLET EVERY NIGHT    Disp: 90 tablet    Refills: 3    Start: 10/20/2024    Class: Normal    Non-formulary    Last ordered: 2 months ago (8/8/2024) by Oneida James MD    Last refill: 8/12/2024    Rx #: [id number]    Cholesterol Medication Protocol Nmslrl02/20/2024 06:18 AM   Protocol Details ALT < 80    ALT resulted within past year    Lipid panel within past 12 months    In person appointment or virtual visit in the past 12 mos or appointment in next 3 mos             Future Appointments   Date Time Provider Department Center   12/7/2024  9:00 AM Oneida James MD [facility] [address] [facility] [address]       
no

## 2024-11-04 ENCOUNTER — OFFICE VISIT (OUTPATIENT)
Dept: FAMILY MEDICINE CLINIC | Facility: CLINIC | Age: 74
End: 2024-11-04
Payer: MEDICARE

## 2024-11-04 VITALS
TEMPERATURE: 98 F | HEIGHT: 64 IN | DIASTOLIC BLOOD PRESSURE: 74 MMHG | RESPIRATION RATE: 16 BRPM | SYSTOLIC BLOOD PRESSURE: 140 MMHG | BODY MASS INDEX: 44.56 KG/M2 | OXYGEN SATURATION: 97 % | WEIGHT: 261 LBS | HEART RATE: 78 BPM

## 2024-11-04 DIAGNOSIS — H66.92 ACUTE LEFT OTITIS MEDIA: Primary | ICD-10-CM

## 2024-11-04 DIAGNOSIS — H60.392 ACUTE INFECTIVE OTITIS EXTERNA OF LEFT EAR: ICD-10-CM

## 2024-11-04 PROCEDURE — 1160F RVW MEDS BY RX/DR IN RCRD: CPT

## 2024-11-04 PROCEDURE — 3078F DIAST BP <80 MM HG: CPT

## 2024-11-04 PROCEDURE — 99213 OFFICE O/P EST LOW 20 MIN: CPT

## 2024-11-04 PROCEDURE — 1159F MED LIST DOCD IN RCRD: CPT

## 2024-11-04 PROCEDURE — 3008F BODY MASS INDEX DOCD: CPT

## 2024-11-04 PROCEDURE — 3077F SYST BP >= 140 MM HG: CPT

## 2024-11-04 RX ORDER — AMOXICILLIN 875 MG/1
875 TABLET, COATED ORAL 2 TIMES DAILY
Qty: 14 TABLET | Refills: 0 | Status: SHIPPED | OUTPATIENT
Start: 2024-11-04 | End: 2024-11-11

## 2024-11-04 RX ORDER — OFLOXACIN 3 MG/ML
10 SOLUTION AURICULAR (OTIC) DAILY
Qty: 5 ML | Refills: 0 | Status: SHIPPED | OUTPATIENT
Start: 2024-11-04 | End: 2024-11-11

## 2024-11-04 NOTE — PROGRESS NOTES
CHIEF COMPLAINT:     Chief Complaint   Patient presents with    Ear Problem       HPI:   Huyen Renner is a 74 year old female who presents to clinic today with complaints of left ear pain. Has had for 7  days. Pain radiates to jaw and is worse when chewing.  Patient denies history of ear infections. Home treatment includes Debrox and Tylenol. Patient wears bilateral hearing aids. Patient reports decreased hearing. Patient denies drainage. Patient denies use of cotton tipped ear swabs to clean the ears. Patient denies recent URI symptoms.    Current Outpatient Medications   Medication Sig Dispense Refill    amoxicillin 875 MG Oral Tab Take 1 tablet (875 mg total) by mouth 2 (two) times daily for 7 days. 14 tablet 0    ofloxacin 0.3 % Otic Solution Place 10 drops into the left ear daily for 7 days. 5 mL 0    Pravastatin Sodium 80 MG Oral Tab TAKE 1 TABLET EVERY NIGHT 90 tablet 0    lisinopril 10 MG Oral Tab TAKE 1 TABLET EVERY DAY 90 tablet 0    Metamucil Fiber Oral Chew Tab Chew 1 tablet by mouth daily. 1 gummy daily      glipiZIDE ER 5 MG Oral Tablet 24 Hr Take 1 tablet (5 mg total) by mouth daily with breakfast. 90 tablet 0    JARDIANCE 10 MG Oral Tab TAKE 1 TABLET EVERY DAY 90 tablet 0    HYDROcodone-acetaminophen 5-325 MG Oral Tab Take 1 tablet by mouth every 6 (six) hours as needed for Pain. Do not drive or operate machinery within 8 hours of taking this medication 20 tablet 0    torsemide 20 MG Oral Tab TAKE 1 TABLET EVERY DAY 90 tablet 0    ACCU-CHEK GUIDE In Vitro Strip TEST BLOOD SUGAR TWO TIMES DAILY 200 strip 3    Alcohol Swabs (DROPSAFE ALCOHOL PREP) 70 % Does not apply Pads USE AS DIRECTED TWICE DAILY WHEN CHECKING BLOOD SUGAR 200 each 1    ACCU-CHEK SOFTCLIX LANCETS Does not apply Misc TEST BLOOD SUGAR TWO TIMES DAILY 200 each 3    Blood Glucose Monitoring Suppl (ACCU-CHEK GUIDE ME) w/Device Does not apply Kit Take 1 Bottle by mouth As Directed. Controlled diabetes mellitus type 2 DX:  E11.8 1 kit 0     Blood Glucose Monitoring Suppl (TRUE METRIX AIR GLUCOSE METER) w/Device Does not apply Kit 1 each 2 (two) times a day. Diagnosis code: E11.8 1 kit 0    Glucose Blood (RELION TRUE METRIX TEST STRIPS) In Vitro Strip 1 strip by Finger stick route 2 (two) times a day. Diagnosis code: E11.8 100 each 0      Past Medical History:    Acute congestive heart failure (HCC)    Acute diastolic heart failure (HCC)    Acute renal failure, unspecified acute renal failure type (HCC)    JENNIFER (acute kidney injury) (HCC)    ATN (acute tubular necrosis) (HCC)    Bronchopneumonia, organism unspecified    Congestive heart disease (HCC)    Diabetes (HCC)    Essential hypertension    Hearing impairment    High blood pressure    High cholesterol    Hyperkalemia    Hyperlipidemia    Hyponatremia    Laboratory examination ordered as part of a routine general medical examination    Obesity    Osteopenia    Pure hypercholesterolemia    Sleep apnea    Thrombocytopenia (HCC)    Visual impairment    Reading glasses      Social History:  Social History     Socioeconomic History    Marital status:    Tobacco Use    Smoking status: Former     Current packs/day: 0.00     Types: Cigarettes     Quit date: 1987     Years since quittin.5    Smokeless tobacco: Never   Vaping Use    Vaping status: Never Used   Substance and Sexual Activity    Alcohol use: Not Currently    Drug use: Never   Other Topics Concern    Caffeine Concern No    Stress Concern No    Weight Concern No    Special Diet No    Exercise No    Seat Belt No     Social Drivers of Health     Physical Activity: Sufficiently Active (8/10/2020)    Received from [employer] [employer]    Exercise Vital Sign     Days of Exercise per Week: 7 days     Minutes of Exercise per Session: 30 min        REVIEW OF SYSTEMS:   GENERAL: See HPI  SKIN: no unusual skin lesions or rashes  HEENT: See HPI  LUNGS: No shortness of breath, or wheezing.  CARDIOVASCULAR: No chest  pain, palpitations  GI: No N/V/C/D.  NEURO: denies headaches or dizziness    EXAM:   /74   Pulse 78   Temp 98.4 °F (36.9 °C)   Resp 16   Ht 5' 4\" (1.626 m)   Wt 261 lb (118.4 kg)   SpO2 97%   BMI 44.80 kg/m²   GENERAL: well developed, well nourished,in no apparent distress  SKIN: no rashes,no suspicious lesions  HEAD: atraumatic, normocephalic  EYES: conjunctiva clear, EOM intact  EARS: Bilateral tragus non tender with manipulation. Left external auditory canal with erythema and mild swelling. Right external auditory canal without erythema or inflammation. Right TM: pearly gray, without bulging, no retraction, no effusion, bony landmarks visible.  Left TM: dusky gray and injected, without bulging, no retraction, + effusion, bony landmarks not visible.  NOSE: nostrils patent, no nasal mucus, nasal mucosa moist  THROAT: oral mucosa pink, moist. Posterior pharynx non erythematous or injected. No exudates.  NECK: supple  LUNGS: Respirations are regular. Breathing is non labored.  EXTREMITIES: no cyanosis, clubbing or edema      ASSESSMENT AND PLAN:   Huyen Renner is a 74 year old female who presents with ear problems symptoms are consistent with    ASSESSMENT:  Encounter Diagnoses   Name Primary?    Acute left otitis media Yes    Acute infective otitis externa of left ear        PLAN: Education provided.  Questions answered.  Reassurance given. Based on exam will treat with antibiotics. Meds as listed below. Risk and benefits of medication discussed. Stressed importance of completing full course of antibiotics.  Discussed the importance of providing pain control with the use of Tylenol as needed. Blood pressure elevated in [facility], patient with history of hypertension, patient did take daily blood pressure medications and is asymptomatic. Discussed with patient about elevated blood pressure reading today. Advised patient to check blood pressure at home. If consistently elevated at >140 and >90 patient is  advised to follow up with PCP. Comfort measures as described in Patient Instructions. See PCP if s/sx worsen, do not improve in 3 days, or if fever of 100.4 or greater persists for 72 hours.  Patient verbalized understanding and is in agreement with treatment plan.    CrCl= 47 (based on recent labs on 8/27/2024)     Meds & Refills for this Visit:  Requested Prescriptions     Signed Prescriptions Disp Refills    amoxicillin 875 MG Oral Tab 14 tablet 0     Sig: Take 1 tablet (875 mg total) by mouth 2 (two) times daily for 7 days.    ofloxacin 0.3 % Otic Solution 5 mL 0     Sig: Place 10 drops into the left ear daily for 7 days.

## 2024-11-05 RX ORDER — TORSEMIDE 20 MG/1
TABLET ORAL
Qty: 90 TABLET | Refills: 1 | Status: SHIPPED | OUTPATIENT
Start: 2024-11-05

## 2024-11-05 NOTE — TELEPHONE ENCOUNTER
Requesting Torsemide 20mg  LOV: 9/7/24  RTC: 3 months  Last Relevant Labs: 8/27/24  Filled: 8/22/24 #90 with 0 refills    Future Appointments   Date Time Provider Department Center   12/7/2024  9:00 AM Oneida James MD [facility] [address]     Hypertension Medications Protocol Kyyzoi6111/03/2024 06:22 AM   Protocol Details   EGFRCR or GFRNAA > 50    CMP or BMP in past 12 months    Last BP reading less than 140/90    In person appointment or virtual visit in the past 12 mos or appointment in next 3 mos

## 2024-11-07 ENCOUNTER — PATIENT MESSAGE (OUTPATIENT)
Dept: FAMILY MEDICINE CLINIC | Facility: CLINIC | Age: 74
End: 2024-11-07

## 2024-11-13 DIAGNOSIS — N18.4 CKD (CHRONIC KIDNEY DISEASE) STAGE 4, GFR 15-29 ML/MIN (HCC): ICD-10-CM

## 2024-11-13 DIAGNOSIS — E11.22 TYPE 2 DIABETES MELLITUS WITH STAGE 4 CHRONIC KIDNEY DISEASE, WITHOUT LONG-TERM CURRENT USE OF INSULIN (HCC): ICD-10-CM

## 2024-11-13 DIAGNOSIS — N18.4 TYPE 2 DIABETES MELLITUS WITH STAGE 4 CHRONIC KIDNEY DISEASE, WITHOUT LONG-TERM CURRENT USE OF INSULIN (HCC): ICD-10-CM

## 2024-11-16 RX ORDER — EMPAGLIFLOZIN 10 MG/1
10 TABLET, FILM COATED ORAL DAILY
Qty: 90 TABLET | Refills: 0 | Status: SHIPPED | OUTPATIENT
Start: 2024-11-16

## 2024-11-16 NOTE — TELEPHONE ENCOUNTER
Requested Renewals     Name from pharmacy: Jardiance Oral Tablet 10 MG         Will file in chart as: JARDIANCE 10 MG Oral Tab    Sig: TAKE 1 TABLET EVERY DAY    Disp: 90 tablet    Refills: 3    Start: 11/13/2024    Class: Normal    Non-formulary For: Type 2 diabetes mellitus with stage 4 chronic kidney disease, without long-term current use of insulin (HCC); CKD (chronic kidney disease) stage 4, GFR 15-29 ml/min ([facility])    Last ordered: 2 months ago (8/29/2024) by Oneida James MD    Last refill: 8/30/2024    Rx #: [id number]    Diabetes Medication Protocol Mmjyyp6511/13/2024 11:11 AM   Protocol Details Last A1C < 7.5 and within past 6 months    EGFRCR or GFRNAA > 50    In person appointment or virtual visit in the past 6 mos or appointment in next 3 mos    Microalbumin procedure in past 12 months or taking ACE/ARB    GFR in the past 12 months             Future Appointments   Date Time Provider Department Center   12/7/2024  9:00 AM Oneida James MD [facility] [address]     LOV: 9/7/24 for DM f/up.  RTC: 3 months  Labs ordered for upcoming appt.  Last refill given on 8/29/24 for #90    MCM sent to patient to have labs done prior to upcoming appt.   Microalbumin pended as well.    -please advise.

## 2024-11-19 DIAGNOSIS — I10 PRIMARY HYPERTENSION: ICD-10-CM

## 2024-11-21 DIAGNOSIS — E11.8 CONTROLLED DIABETES MELLITUS TYPE 2 WITH COMPLICATIONS, UNSPECIFIED WHETHER LONG TERM INSULIN USE (HCC): ICD-10-CM

## 2024-11-21 RX ORDER — LISINOPRIL 10 MG/1
10 TABLET ORAL DAILY
Qty: 90 TABLET | Refills: 0 | Status: SHIPPED | OUTPATIENT
Start: 2024-11-21

## 2024-11-21 NOTE — TELEPHONE ENCOUNTER
Requesting Lisinopril 10mg  LOV: 9/7/24  RTC: 3 months  Last Relevant Labs: 8/27/24  Filled: 9/11/24 #90 with 0 refills    Future Appointments   Date Time Provider Department Center   12/7/2024  9:00 AM Oneida James MD [facility] [address] [facility] [address]     Hypertension Medications Protocol Sxrmhs6511/21/2024 02:38 PM   Protocol Details   Last BP reading less than 140/90    EGFRCR or GFRNAA > 50    CMP or BMP in past 12 months    In person appointment or virtual visit in the past 12 mos or appointment in next 3 mos     Rx sent to pharmacy per protocol

## 2024-11-24 LAB
BUN/CREATININE RATIO: 21 (CALC) (ref 6–22)
BUN: 38 MG/DL (ref 7–25)
CALCIUM: 9.1 MG/DL (ref 8.6–10.4)
CARBON DIOXIDE: 29 MMOL/L (ref 20–32)
CHLORIDE: 103 MMOL/L (ref 98–110)
CREATININE: 1.78 MG/DL (ref 0.6–1)
EGFR: 30 ML/MIN/1.73M2
GLUCOSE: 166 MG/DL (ref 65–99)
HEMOGLOBIN A1C: 8.5 % OF TOTAL HGB
POTASSIUM: 4.5 MMOL/L (ref 3.5–5.3)
SODIUM: 139 MMOL/L (ref 135–146)

## 2024-11-25 ENCOUNTER — PATIENT MESSAGE (OUTPATIENT)
Dept: FAMILY MEDICINE CLINIC | Facility: CLINIC | Age: 74
End: 2024-11-25

## 2024-11-25 DIAGNOSIS — N18.30 STAGE 3 CHRONIC KIDNEY DISEASE, UNSPECIFIED WHETHER STAGE 3A OR 3B CKD (HCC): ICD-10-CM

## 2024-11-25 DIAGNOSIS — N18.4 CKD (CHRONIC KIDNEY DISEASE) STAGE 4, GFR 15-29 ML/MIN (HCC): Primary | ICD-10-CM

## 2024-11-25 DIAGNOSIS — E11.22 TYPE 2 DIABETES MELLITUS WITH STAGE 4 CHRONIC KIDNEY DISEASE, WITHOUT LONG-TERM CURRENT USE OF INSULIN (HCC): ICD-10-CM

## 2024-11-25 DIAGNOSIS — N18.4 CKD (CHRONIC KIDNEY DISEASE) STAGE 4, GFR 15-29 ML/MIN (HCC): ICD-10-CM

## 2024-11-25 DIAGNOSIS — N18.4 TYPE 2 DIABETES MELLITUS WITH STAGE 4 CHRONIC KIDNEY DISEASE, WITHOUT LONG-TERM CURRENT USE OF INSULIN (HCC): ICD-10-CM

## 2024-11-26 RX ORDER — ISOPROPYL ALCOHOL 70 ML/100ML
SWAB TOPICAL
Qty: 200 EACH | Refills: 3 | Status: SHIPPED | OUTPATIENT
Start: 2024-11-26

## 2024-11-26 NOTE — TELEPHONE ENCOUNTER
Requesting Alcohol prep  Last OV: 9/7/24  RTC: 3 months  Last Rx'd 6/27/24 #200 with 1 refill    Future Appointments   Date Time Provider Department Center   12/14/2024  9:00 AM Oneida James MD [facility] [address] [facility] [address]   1/10/2025 10:20 AM Francisco Castanon MD [facility] [address]       Non-protocol med:  Rx pended and routed for approval/denial

## 2024-12-03 DIAGNOSIS — N18.4 CKD (CHRONIC KIDNEY DISEASE) STAGE 4, GFR 15-29 ML/MIN (HCC): ICD-10-CM

## 2024-12-03 DIAGNOSIS — E11.22 TYPE 2 DIABETES MELLITUS WITH STAGE 4 CHRONIC KIDNEY DISEASE, WITHOUT LONG-TERM CURRENT USE OF INSULIN (HCC): ICD-10-CM

## 2024-12-03 DIAGNOSIS — N18.4 TYPE 2 DIABETES MELLITUS WITH STAGE 4 CHRONIC KIDNEY DISEASE, WITHOUT LONG-TERM CURRENT USE OF INSULIN (HCC): ICD-10-CM

## 2024-12-05 DIAGNOSIS — E11.22 TYPE 2 DIABETES MELLITUS WITH STAGE 4 CHRONIC KIDNEY DISEASE, WITHOUT LONG-TERM CURRENT USE OF INSULIN (HCC): ICD-10-CM

## 2024-12-05 DIAGNOSIS — N18.4 CKD (CHRONIC KIDNEY DISEASE) STAGE 4, GFR 15-29 ML/MIN (HCC): ICD-10-CM

## 2024-12-05 DIAGNOSIS — N18.4 TYPE 2 DIABETES MELLITUS WITH STAGE 4 CHRONIC KIDNEY DISEASE, WITHOUT LONG-TERM CURRENT USE OF INSULIN (HCC): ICD-10-CM

## 2024-12-05 NOTE — TELEPHONE ENCOUNTER
Patient calling asking for 90 day supplies for Jardiance 10 mg. Patient states that she needs to get refill before January 1st, 2025 otherwise she will have to pay $500 out of pocket.     This PSR advised patient that refills take 3-5 business days.

## 2024-12-06 RX ORDER — GLIPIZIDE 5 MG/1
5 TABLET, FILM COATED, EXTENDED RELEASE ORAL
Qty: 30 TABLET | Refills: 0 | Status: SHIPPED | OUTPATIENT
Start: 2024-12-06 | End: 2025-01-05

## 2024-12-06 NOTE — TELEPHONE ENCOUNTER
Short-term refill sent. She has an upcoming appointment on 12/14, when we will plan to discuss stopping this medication and initiation of insulin instead for poorly controlled DM

## 2024-12-06 NOTE — TELEPHONE ENCOUNTER
Requesting Glipizide 5mg  LOV: 9/7/24  RTC: 3 months  Last Relevant Labs: 11/23/24  Filled: 9/7/24 #90 with 0 refills    Future Appointments   Date Time Provider Department Center   12/14/2024  9:00 AM Oneida James MD [facility] [address] [facility] [address]   1/10/2025 10:20 AM Francisco Castanon MD [facility] [place]     Diabetes Medication Protocol Kykeas1412/06/2024 01:32 PM   Protocol Details   Last A1C < 7.5 and within past 6 months    EGFRCR or GFRNAA > 50    In person appointment or virtual visit in the past 6 mos or appointment in next 3 mos    Microalbumin procedure in past 12 months or taking ACE/ARB    GFR in the past 12 months     Failed protocol:  Rx pended and routed for approval/denial

## 2024-12-09 RX ORDER — GLIPIZIDE 5 MG/1
5 TABLET, FILM COATED, EXTENDED RELEASE ORAL
Qty: 90 TABLET | Refills: 3 | OUTPATIENT
Start: 2024-12-09

## 2024-12-09 NOTE — TELEPHONE ENCOUNTER
Duplicate request - rx sent 12/6/24      Disp Refills Start End     glipiZIDE ER 5 MG Oral Tablet 24 Hr 30 tablet 0 12/6/2024 1/5/2025    Sig - Route: Take 1 tablet (5 mg total) by mouth daily with breakfast. - Oral    Sent to pharmacy as: glipiZIDE ER 5 MG Oral Tablet Extended Release 24 Hour (Glucotrol XL)    E-Prescribing Status: Receipt confirmed by pharmacy (12/6/2024  2:28 PM CST)        [facility] - [facility] [address] [facility] [phone], [phone]

## 2024-12-13 ENCOUNTER — TELEPHONE (OUTPATIENT)
Dept: FAMILY MEDICINE CLINIC | Facility: CLINIC | Age: 74
End: 2024-12-13

## 2024-12-13 DIAGNOSIS — N18.4 CKD (CHRONIC KIDNEY DISEASE) STAGE 4, GFR 15-29 ML/MIN (HCC): ICD-10-CM

## 2024-12-13 DIAGNOSIS — N18.4 TYPE 2 DIABETES MELLITUS WITH STAGE 4 CHRONIC KIDNEY DISEASE, WITHOUT LONG-TERM CURRENT USE OF INSULIN (HCC): ICD-10-CM

## 2024-12-13 DIAGNOSIS — E11.22 TYPE 2 DIABETES MELLITUS WITH STAGE 4 CHRONIC KIDNEY DISEASE, WITHOUT LONG-TERM CURRENT USE OF INSULIN (HCC): ICD-10-CM

## 2024-12-13 NOTE — TELEPHONE ENCOUNTER
Patient requesting for Jardiance to be sent to another mail order pharmacy. Patient states [facility] pharmacy is pricing medication more expensive than expected. Patient requesting to use [facility] [facility] for a better price, can have prescription faxed to [phone]

## 2024-12-13 NOTE — TELEPHONE ENCOUNTER
[facility] [phone]. Fax confirmation received.

## 2024-12-14 ENCOUNTER — OFFICE VISIT (OUTPATIENT)
Dept: FAMILY MEDICINE CLINIC | Facility: CLINIC | Age: 74
End: 2024-12-14
Payer: MEDICARE

## 2024-12-14 VITALS
BODY MASS INDEX: 45.07 KG/M2 | HEIGHT: 64 IN | TEMPERATURE: 97 F | RESPIRATION RATE: 16 BRPM | OXYGEN SATURATION: 98 % | DIASTOLIC BLOOD PRESSURE: 70 MMHG | WEIGHT: 264 LBS | HEART RATE: 78 BPM | SYSTOLIC BLOOD PRESSURE: 136 MMHG

## 2024-12-14 DIAGNOSIS — E11.22 TYPE 2 DIABETES MELLITUS WITH STAGE 4 CHRONIC KIDNEY DISEASE, WITHOUT LONG-TERM CURRENT USE OF INSULIN (HCC): Primary | ICD-10-CM

## 2024-12-14 DIAGNOSIS — N18.4 TYPE 2 DIABETES MELLITUS WITH STAGE 4 CHRONIC KIDNEY DISEASE, WITHOUT LONG-TERM CURRENT USE OF INSULIN (HCC): Primary | ICD-10-CM

## 2024-12-14 DIAGNOSIS — N18.4 CKD (CHRONIC KIDNEY DISEASE) STAGE 4, GFR 15-29 ML/MIN (HCC): ICD-10-CM

## 2024-12-14 RX ORDER — INSULIN GLARGINE 100 [IU]/ML
5 INJECTION, SOLUTION SUBCUTANEOUS NIGHTLY
Qty: 3 ML | Refills: 0 | Status: CANCELLED | OUTPATIENT
Start: 2024-12-14

## 2024-12-14 NOTE — PROGRESS NOTES
Subjective:      Chief Complaint   Patient presents with    Diabetes     Labs completed 24     HISTORY OF PRESENT ILLNESS  HPI  HPI obtained per patient report.  Huyen Renner is a pleasant 74 year old female presenting for follow-up.   Her fasting BG levels have been in the 130s-140s.    PAST PATIENT HISTORY  Past Medical History:    Acute congestive heart failure (HCC)    Acute diastolic heart failure (HCC)    Acute renal failure, unspecified acute renal failure type (HCC)    JENNIFER (acute kidney injury) (HCC)    ATN (acute tubular necrosis) (HCC)    Bronchopneumonia, organism unspecified    Congestive heart disease (HCC)    Diabetes (HCC)    Essential hypertension    Hearing impairment    High blood pressure    High cholesterol    Hyperkalemia    Hyperlipidemia    Hyponatremia    Laboratory examination ordered as part of a routine general medical examination    Obesity    Osteopenia    Pure hypercholesterolemia    Sleep apnea    Thrombocytopenia (HCC)    Visual impairment    Reading glasses     Past Surgical History:   Procedure Laterality Date    Cholecystectomy      Colonoscopy      Colonoscopy N/A 2017    Procedure: COLONOSCOPY;  Surgeon: Mayo Sewell MD;  Location: [facility] ENDOSCOPY    Colonoscopy  2023    Colonoscopy N/A 2018    Procedure: COLONOSCOPY;  Surgeon: Mayo Sewell MD;  Location: [facility] ENDOSCOPY    Colonoscopy N/A 2023    Procedure: COLONOSCOPY;  Surgeon: Mayo Sewell MD;  Location: [facility] ENDOSCOPY    Hysterectomy  2000    partial hysteectomy          Tonsillectomy         CURRENT MEDICATIONS  Medications Taking[1]    HEALTH MAINTENANCE  Immunization History   Administered Date(s) Administered    >=3 YRS TRI  MULTIDOSE VIAL (96852) FLU CLINIC 2014    Covid-19 Vaccine Pfizer 30 mcg/0.3 ml 2021, 2021, 10/06/2021    Covid-19 Vaccine Pfizer Bivalent 30mcg/0.3mL 10/29/2022    Covid-19 Vaccine Pfizer Darwin-Sucrose 30 mcg/0.3 ml 2022    FLU VAC  High Dose 65 YRS & Older PRSV Free (04168) 2017, 2020, 2021, 10/17/2022, 2023    FLUAD High Dose 65 yr and older (87408) 09/10/2018, 2019    Fluvirin, 3 Years & >, Im 2014    Fluzone Vaccine Medicare () 2017    Influenza 10/02/2019    Moderna Covid-19 Vaccine 50mcg/0.5ml 12yrs+ 2023    Novavax Covid-19 Vaccine 5mcg/0.5ml 12yrs+ (4908-5463) 10/19/2024    Pneumococcal (Prevnar 13) 2017    Pneumovax 23 09/10/2018   Deferred Date(s) Deferred    Pneumovax 23 2016       ALLERGIES AND DRUG REACTIONS  Allergies[2]    Family History   Problem Relation Age of Onset    Diabetes Mother     Stroke Mother     Cancer Sister         breast    Diabetes Sister     Obesity Sister     Diabetes Brother     Obesity Brother      Social History     Socioeconomic History    Marital status:    Tobacco Use    Smoking status: Former     Current packs/day: 0.00     Types: Cigarettes     Quit date: 1987     Years since quittin.6    Smokeless tobacco: Never   Vaping Use    Vaping status: Never Used   Substance and Sexual Activity    Alcohol use: Not Currently    Drug use: Never   Other Topics Concern    Caffeine Concern No    Stress Concern No    Weight Concern No    Special Diet No    Exercise No    Seat Belt No     Social Drivers of Health     Physical Activity: Sufficiently Active (8/10/2020)    Received from [email or web address], [facility]    Exercise Vital Sign     Days of Exercise per Week: 7 days     Minutes of Exercise per Session: 30 min       Review of Systems   All other systems reviewed and are negative.         Objective:      /70   Pulse 78   Temp 97.2 °F (36.2 °C) (Temporal)   Resp 16   Ht 5' 4\" (1.626 m)   Wt 264 lb (119.7 kg)   SpO2 98%   BMI 45.32 kg/m²   Body mass index is 45.32 kg/m².    Physical Exam  Vitals reviewed.   Constitutional:       General: She is not in acute distress.     Appearance: She is not  ill-appearing, toxic-appearing or diaphoretic.   HENT:      Head: Normocephalic and atraumatic.   Eyes:      General: No scleral icterus.        Right eye: No discharge.         Left eye: No discharge.   Cardiovascular:      Rate and Rhythm: Normal rate.   Pulmonary:      Effort: Pulmonary effort is normal.   Abdominal:      General: There is no distension.   Musculoskeletal:      Cervical back: Neck supple.   Neurological:      Mental Status: She is alert and oriented to person, place, and time.            Assessment and Plan:      1. Type 2 diabetes mellitus with stage 4 chronic kidney disease, without long-term current use of insulin ([facility]) (Primary)  2. CKD (chronic kidney disease) stage 4, GFR 15-29 ml/min ([facility])    Return in about 1 week (around 12/21/2024) for virtual audio visit in 1 week .    - her A1C has further increased to 8.5  - she was asked to check her BG levels 3 times per day including postprandially and to follow-up in 1 week with her readings  - we discussed that we will likely need to start meal-time insulin, and dose will be pending her BG levels. We discussed that she should stop taking glipizide when she starts insulin  - continue jardiance    Patient verbalized understanding of assessment and recommendations. All questions and concerns were addressed.    Electronically signed by Oneida James MD         [1]   Outpatient Medications Marked as Taking for the 12/14/24 encounter (Office Visit) with Oneida James MD   Medication Sig Dispense Refill    empagliflozin (JARDIANCE) 10 MG Oral Tab Take 1 tablet (10 mg total) by mouth daily. 90 tablet 0    Alcohol Swabs (DROPSAFE ALCOHOL PREP) 70 % Does not apply Pads USE AS DIRECTED TWICE DAILY WHEN CHECKING BLOOD SUGAR 200 each 3    lisinopril 10 MG Oral Tab TAKE 1 TABLET EVERY DAY 90 tablet 0    torsemide 20 MG Oral Tab TAKE 1 TABLET EVERY DAY 90 tablet 1    Pravastatin Sodium 80 MG Oral Tab TAKE 1 TABLET EVERY NIGHT 90 tablet 0    Metamucil Fiber Oral  Chew Tab Chew 1 tablet by mouth daily. 1 gummy daily      ACCU-CHEK GUIDE In Vitro Strip TEST BLOOD SUGAR TWO TIMES DAILY 200 strip 3    ACCU-CHEK SOFTCLIX LANCETS Does not apply Misc TEST BLOOD SUGAR TWO TIMES DAILY 200 each 3    Blood Glucose Monitoring Suppl (ACCU-CHEK GUIDE ME) w/Device Does not apply Kit Take 1 Bottle by mouth As Directed. Controlled diabetes mellitus type 2 DX:  E11.8 1 kit 0    Blood Glucose Monitoring Suppl (TRUE METRIX AIR GLUCOSE METER) w/Device Does not apply Kit 1 each 2 (two) times a day. Diagnosis code: E11.8 1 kit 0    Glucose Blood (RELION TRUE METRIX TEST STRIPS) In Vitro Strip 1 strip by Finger stick route 2 (two) times a day. Diagnosis code: E11.8 100 each 0   [2] No Known Allergies

## 2024-12-17 DIAGNOSIS — E11.8 CONTROLLED DIABETES MELLITUS TYPE 2 WITH COMPLICATIONS, UNSPECIFIED WHETHER LONG TERM INSULIN USE (HCC): ICD-10-CM

## 2024-12-19 ENCOUNTER — VIRTUAL PHONE E/M (OUTPATIENT)
Dept: FAMILY MEDICINE CLINIC | Facility: CLINIC | Age: 74
End: 2024-12-19
Payer: MEDICARE

## 2024-12-19 ENCOUNTER — TELEPHONE (OUTPATIENT)
Dept: FAMILY MEDICINE CLINIC | Facility: CLINIC | Age: 74
End: 2024-12-19

## 2024-12-19 DIAGNOSIS — E11.22 TYPE 2 DIABETES MELLITUS WITH STAGE 4 CHRONIC KIDNEY DISEASE, WITHOUT LONG-TERM CURRENT USE OF INSULIN (HCC): ICD-10-CM

## 2024-12-19 DIAGNOSIS — N18.4 CKD (CHRONIC KIDNEY DISEASE) STAGE 4, GFR 15-29 ML/MIN (HCC): ICD-10-CM

## 2024-12-19 DIAGNOSIS — N18.4 TYPE 2 DIABETES MELLITUS WITH STAGE 4 CHRONIC KIDNEY DISEASE, WITHOUT LONG-TERM CURRENT USE OF INSULIN (HCC): ICD-10-CM

## 2024-12-19 DIAGNOSIS — E11.22 TYPE 2 DIABETES MELLITUS WITH STAGE 4 CHRONIC KIDNEY DISEASE, WITHOUT LONG-TERM CURRENT USE OF INSULIN (HCC): Primary | ICD-10-CM

## 2024-12-19 DIAGNOSIS — N18.4 TYPE 2 DIABETES MELLITUS WITH STAGE 4 CHRONIC KIDNEY DISEASE, WITHOUT LONG-TERM CURRENT USE OF INSULIN (HCC): Primary | ICD-10-CM

## 2024-12-19 RX ORDER — BLOOD-GLUCOSE METER
1 EACH MISCELLANEOUS 3 TIMES DAILY
Qty: 1 KIT | Refills: 3 | Status: SHIPPED | OUTPATIENT
Start: 2024-12-19

## 2024-12-19 RX ORDER — INSULIN LISPRO 100 [IU]/ML
3 INJECTION, SOLUTION INTRAVENOUS; SUBCUTANEOUS
Qty: 3 ML | Refills: 1 | Status: SHIPPED | OUTPATIENT
Start: 2024-12-19 | End: 2024-12-19

## 2024-12-19 RX ORDER — INSULIN LISPRO 100 [IU]/ML
3 INJECTION, SOLUTION INTRAVENOUS; SUBCUTANEOUS
Qty: 15 ML | Refills: 0 | Status: SHIPPED | OUTPATIENT
Start: 2024-12-19

## 2024-12-19 NOTE — TELEPHONE ENCOUNTER
Requesting Accu-chek glucometer  LOV: 12/14/24  RTC: 1 week  Last Relevant Labs: 11/23/24      Future Appointments   Date Time Provider Department Center   12/19/2024  1:00 PM Oneida James MD [facility] [address] [facility] [address]   1/10/2025 10:20 AM Francisco Castanon MD [facility] [place]     Diabetic Supplies Protocol [id number]/17/2024 10:14 AM   Protocol Details   In person appointment or virtual visit in the past 12 mos or appointment in next 3 mos     Rx sent to pharmacy per protocol

## 2024-12-19 NOTE — TELEPHONE ENCOUNTER
Calling in regards to the prescription for the Humalog Quick Pens. It was sent with an incorrect quantity. Pens are in 15 ml, can't break the box for 3 ml.

## 2024-12-19 NOTE — PROGRESS NOTES
Subjective:      No chief complaint on file.    HISTORY OF PRESENT ILLNESS  HPI  HPI obtained per patient report.  Huyen Renner is a pleasant 74 year old female presenting virtually for follow-up for her diabetes.   Since her LOV, her fasting BG levels have been in the 150s. Before lunch, her BG level has been ranging from 110s-170s. Before, dinner, her BG levels have been in the 150s other than a reading of 103 yesterday. She has not been checking her postprandial  levels.     PAST PATIENT HISTORY  Past Medical History:    Acute congestive heart failure (HCC)    Acute diastolic heart failure (HCC)    Acute renal failure, unspecified acute renal failure type (HCC)    JENNIFER (acute kidney injury) (HCC)    ATN (acute tubular necrosis) (HCC)    Bronchopneumonia, organism unspecified    Congestive heart disease (HCC)    Diabetes (HCC)    Essential hypertension    Hearing impairment    High blood pressure    High cholesterol    Hyperkalemia    Hyperlipidemia    Hyponatremia    Laboratory examination ordered as part of a routine general medical examination    Obesity    Osteopenia    Pure hypercholesterolemia    Sleep apnea    Thrombocytopenia (HCC)    Visual impairment    Reading glasses     Past Surgical History:   Procedure Laterality Date    Cholecystectomy      Colonoscopy      Colonoscopy N/A 2017    Procedure: COLONOSCOPY;  Surgeon: aMyo Sewell MD;  Location: [facility] ENDOSCOPY    Colonoscopy  2023    Colonoscopy N/A 2018    Procedure: COLONOSCOPY;  Surgeon: Mayo Sewell MD;  Location: [facility] ENDOSCOPY    Colonoscopy N/A 2023    Procedure: COLONOSCOPY;  Surgeon: Mayo Sewell MD;  Location: [facility] ENDOSCOPY    Hysterectomy  2000    partial hysteectomy          Tonsillectomy         CURRENT MEDICATIONS  Medications Taking[1]    HEALTH MAINTENANCE  Immunization History   Administered Date(s) Administered    >=3 YRS TRI  MULTIDOSE VIAL (84078) FLU CLINIC 2014    Covid-19 Vaccine  Pfizer 30 mcg/0.3 ml 2021, 2021, 10/06/2021    Covid-19 Vaccine Pfizer Bivalent 30mcg/0.3mL 10/29/2022    Covid-19 Vaccine Pfizer Darwin-Sucrose 30 mcg/0.3 ml 2022    FLU VAC High Dose 65 YRS & Older PRSV Free (52121) 2017, 2020, 2021, 10/17/2022, 2023    FLUAD High Dose 65 yr and older (76515) 09/10/2018, 2019    Fluvirin, 3 Years & >, Im 2014    Fluzone Vaccine Medicare () 2017    Influenza 10/02/2019    Moderna Covid-19 Vaccine 50mcg/0.5ml 12yrs+ 2023    Novavax Covid-19 Vaccine 5mcg/0.5ml 12yrs+ (6203-7954) 10/19/2024    Pneumococcal (Prevnar 13) 2017    Pneumovax 23 09/10/2018   Deferred Date(s) Deferred    Pneumovax 23 2016       ALLERGIES AND DRUG REACTIONS  Allergies[2]    Family History   Problem Relation Age of Onset    Diabetes Mother     Stroke Mother     Cancer Sister         breast    Diabetes Sister     Obesity Sister     Diabetes Brother     Obesity Brother      Social History     Socioeconomic History    Marital status:    Tobacco Use    Smoking status: Former     Current packs/day: 0.00     Types: Cigarettes     Quit date: 1987     Years since quittin.6    Smokeless tobacco: Never   Vaping Use    Vaping status: Never Used   Substance and Sexual Activity    Alcohol use: Not Currently    Drug use: Never   Other Topics Concern    Caffeine Concern No    Stress Concern No    Weight Concern No    Special Diet No    Exercise No    Seat Belt No     Social Drivers of Health     Physical Activity: Sufficiently Active (8/10/2020)    Received from [employer]    Exercise Vital Sign     Days of Exercise per Week: 7 days     Minutes of Exercise per Session: 30 min       Review of Systems   All other systems reviewed and are negative.         Objective:      There were no vitals taken for this visit.  There is no height or weight on file to calculate BMI.    Physical  Exam  Neurological:      Mental Status: She is alert and oriented to person, place, and time.   Psychiatric:         Mood and Affect: Mood normal.            Assessment and Plan:      1. Type 2 diabetes mellitus with stage 4 chronic kidney disease, without long-term current use of insulin ([facility]) (Primary)  -     Insulin Lispro (1 Unit Dial); Inject 3 Units into the skin 3 (three) times daily before meals. Do not take if your pre-meal blood sugar level is lower than 110  Dispense: 3 mL; Refill: 1  2. CKD (chronic kidney disease) stage 4, GFR 15-29 ml/min ([facility])  -     Insulin Lispro (1 Unit Dial); Inject 3 Units into the skin 3 (three) times daily before meals. Do not take if your pre-meal blood sugar level is lower than 110  Dispense: 3 mL; Refill: 1    Return in about 3 months (around 3/19/2025) for follow-up.    - her A1C has further increased to 8.5  - recommended checking her BG levels before each meal and starting mealtime insulin as prescribed (3 units if her BG level is 110 or higher)  - also recommended checking a postprandial BG level at least once daily  - she was asked to send her BG log of her 4 times daily levels in 2 weeks via My Chart  - recommended stopping glipizide   - continue jardiance  - we discussed the symptoms/signs of possible hypoglycemia and recommended checking her BG level if she experiences any of these symptoms/signs. If her BG level is <70, we discussed that this is too low and that she should treat with hard candies or 1/4 cup of pop/juice then recheck 15 minutes later. If she experiences frequent hypoglycemic episodes (more than once weekly), she was asked to notify me    Patient verbalized understanding of assessment and recommendations. All questions and concerns were addressed.    Telehealth appointment with audio was scheduled and completed with the patient's informed consent. Duration of the telehealth appointment was 11 minutes. Telehealth appointment took place in context of  CDC social distancing guidelines during the Covid-19 pandemic.      Electronically signed by Oneida James MD         [1]   Outpatient Medications Marked as Taking for the 12/19/24 encounter (Virtual Phone E/M) with Oneida James MD   Medication Sig Dispense Refill    Insulin Lispro, 1 Unit Dial, (HUMALOG KWIKPEN) 100 UNIT/ML Subcutaneous Solution Pen-injector Inject 3 Units into the skin 3 (three) times daily before meals. Do not take if your pre-meal blood sugar level is lower than 110 3 mL 1   [2] No Known Allergies     Registered Dietitian/to ensure adequate caloric intake given familial report of decreased PO intake prior to admission

## 2024-12-20 RX ORDER — PEN NEEDLE, DIABETIC 30 GX3/16"
1 NEEDLE, DISPOSABLE MISCELLANEOUS 3 TIMES DAILY
COMMUNITY
Start: 2024-12-20

## 2025-01-03 RX ORDER — PRAVASTATIN SODIUM 80 MG/1
80 TABLET ORAL NIGHTLY
Qty: 90 TABLET | Refills: 1 | Status: SHIPPED | OUTPATIENT
Start: 2025-01-03

## 2025-01-03 NOTE — TELEPHONE ENCOUNTER
Requesting Pravastatin 80mg  LOV: 12/19/24  RTC: 3 months  Last Relevant Labs: 6/1/24  Filled: 10/21/24 #90 with 0 refills    Future Appointments   Date Time Provider Department Center   1/10/2025 10:20 AM Francisco Castanon MD [facility] [place]     Cholesterol Medication Protocol Ecneha1101/03/2025 02:23 PM   Protocol Details   ALT < 80    ALT resulted within past year    Lipid panel within past 12 months    In person appointment or virtual visit in the past 12 mos or appointment in next 3 mos     Rx sent to pharmacy per protocol

## 2025-01-05 ENCOUNTER — PATIENT MESSAGE (OUTPATIENT)
Dept: FAMILY MEDICINE CLINIC | Facility: CLINIC | Age: 75
End: 2025-01-05

## 2025-01-05 DIAGNOSIS — E11.22 TYPE 2 DIABETES MELLITUS WITH STAGE 4 CHRONIC KIDNEY DISEASE, WITHOUT LONG-TERM CURRENT USE OF INSULIN (HCC): Primary | ICD-10-CM

## 2025-01-05 DIAGNOSIS — N18.4 TYPE 2 DIABETES MELLITUS WITH STAGE 4 CHRONIC KIDNEY DISEASE, WITHOUT LONG-TERM CURRENT USE OF INSULIN (HCC): Primary | ICD-10-CM

## 2025-01-07 RX ORDER — INSULIN GLARGINE 100 [IU]/ML
3 INJECTION, SOLUTION SUBCUTANEOUS NIGHTLY
Qty: 15 ML | Refills: 0 | Status: SHIPPED | OUTPATIENT
Start: 2025-01-07

## 2025-01-08 RX ORDER — PEN NEEDLE, DIABETIC 32GX 5/32"
1 NEEDLE, DISPOSABLE MISCELLANEOUS 3 TIMES DAILY
Qty: 300 EACH | Refills: 1 | Status: SHIPPED | OUTPATIENT
Start: 2025-01-08

## 2025-01-08 NOTE — TELEPHONE ENCOUNTER
Requested Renewals     Name from pharmacy: BD Pen Needle Shannan 2nd Gen Miscellaneous 32G X 4 MM         Will file in chart as: BD PEN NEEDLE SHANNAN 2ND GEN 32G X 4 MM Does not apply Misc    Sig: USE AS DIRECTED THREE TIMES A DAY    Disp: Not specified (Pharmacy requested: 100 Undefined)    Refills: 0    Start: 1/7/2025    Class: Normal    Non-formulary    Last ordered: 2 weeks ago (12/20/2024) by Oneida James MD    Last refill: 12/20/2024    Rx #: [id number]    Diabetic Supplies Protocol [id number]/08/2025 03:54 PM   Protocol Details In person appointment or virtual visit in the past 12 mos or appointment in next 3 mos    Medication is active on med list             Future Appointments   Date Time Provider Department Center   1/10/2025 10:20 AM Francisco Castanon MD [facility] [place]     LOV: 12/19/24 video visit  RTC: 3 months

## 2025-01-10 ENCOUNTER — OFFICE VISIT (OUTPATIENT)
Dept: NEPHROLOGY | Facility: CLINIC | Age: 75
End: 2025-01-10
Payer: MEDICARE

## 2025-01-10 VITALS — DIASTOLIC BLOOD PRESSURE: 80 MMHG | WEIGHT: 255.25 LBS | BODY MASS INDEX: 44 KG/M2 | SYSTOLIC BLOOD PRESSURE: 108 MMHG

## 2025-01-10 DIAGNOSIS — N18.30 STAGE 3 CHRONIC KIDNEY DISEASE, UNSPECIFIED WHETHER STAGE 3A OR 3B CKD (HCC): Primary | ICD-10-CM

## 2025-01-10 NOTE — PROGRESS NOTES
[facility]  Nephrology Clinic  Note         CC: CKD       HPI: 75 y/o female with hx of DM, HTN, obesity, who presents for evaluation of CKD. Pt was seen in 2020 for JENNIFER - required HD temporarily. Cr @ baseline ~ 1.3 in 2020.  She was lost to f/u subsequently - she does not like going to doctors generally.  She is here w/ her family member today    Feels well  No complains  No urinary issues  No LE edema    She is being monitored and managed for DM currently- on jardiance and started on insulin recently     Insulin Pen Needle (BD PEN NEEDLE SHANNAN 2ND GEN) 32G X 4 MM Does not apply Misc USE AS DIRECTED THREE TIMES A  each 1    insulin glargine (LANTUS SOLOSTAR) 100 UNIT/ML Subcutaneous Solution Pen-injector Inject 3 Units into the skin nightly. 15 mL 0    Pravastatin Sodium 80 MG Oral Tab TAKE 1 TABLET EVERY NIGHT 90 tablet 1    Blood Glucose Monitoring Suppl (ACCU-CHEK GUIDE ME) w/Device Does not apply Kit Take 1 Bottle by mouth in the morning, at noon, and at bedtime. DX E11.8 1 kit 3    Insulin Lispro, 1 Unit Dial, (HUMALOG KWIKPEN) 100 UNIT/ML Subcutaneous Solution Pen-injector Inject 3 Units into the skin 3 (three) times daily before meals. Do not take if your pre-meal blood sugar level is lower than 110 15 mL 0    empagliflozin (JARDIANCE) 10 MG Oral Tab Take 1 tablet (10 mg total) by mouth daily. 90 tablet 0    Alcohol Swabs (DROPSAFE ALCOHOL PREP) 70 % Does not apply Pads USE AS DIRECTED TWICE DAILY WHEN CHECKING BLOOD SUGAR 200 each 3    lisinopril 10 MG Oral Tab TAKE 1 TABLET EVERY DAY 90 tablet 0    torsemide 20 MG Oral Tab TAKE 1 TABLET EVERY DAY 90 tablet 1    Metamucil Fiber Oral Chew Tab Chew 1 tablet by mouth daily. 1 gummy daily      ACCU-CHEK GUIDE In Vitro Strip TEST BLOOD SUGAR TWO TIMES DAILY 200 strip 3    ACCU-CHEK SOFTCLIX LANCETS Does not apply Misc TEST BLOOD SUGAR TWO TIMES DAILY 200 each 3    Blood Glucose Monitoring Suppl (TRUE METRIX AIR GLUCOSE METER) w/Device Does not apply  Kit 1 each 2 (two) times a day. Diagnosis code: E11.8 1 kit 0    Glucose Blood (RELION TRUE METRIX TEST STRIPS) In Vitro Strip 1 strip by Finger stick route 2 (two) times a day. Diagnosis code: E11.8 100 each 0       Physical Exam:   /80   Wt 255 lb 4 oz (115.8 kg)   BMI 43.81 kg/m²   Temp (24hrs), Av.8 °F (37.1 °C), Min:98.5 °F (36.9 °C), Max:99.2 °F (37.3 °C)       Intake/Output Summary (Last 24 hours) at 2020 1352  Last data filed at 2020 1225  Gross per 24 hour   Intake 460 ml   Output 2250 ml   Net -1790 ml     Wt Readings from Last 6 Encounters:   01/10/25 255 lb 4 oz (115.8 kg)   24 264 lb (119.7 kg)   24 261 lb (118.4 kg)   24 261 lb 2 oz (118.4 kg)   24 255 lb (115.7 kg)   24 257 lb (116.6 kg)       General: Alert and oriented in no apparent distress.  HEENT: No scleral icterus, MMM  Neck: Supple  Cardiac: Regular rate and rhythm, S1, S2 normal, no murmur or rub  Lungs: Clear without wheezes, rales, rhonchi.    Abdomen: Soft, non-tender. + bowel sounds, no palpable organomegaly  Extremities:  no joaquín a  Neurologic:  moving all extremities  Skin: Warm and dry, no rash    Labs reviewed      Impression/Plan:    1.  CKD 3; gradual progression of disease over past 5 years. Suspect primarily related to DM/HTN.  - agree w/ optimization of DM/HTN  - continue jardiance/acei  - monitor labs semi-annually  - no additional workup for now    2. DM- as above; pt will discuss possibility of GLP1 w/ pcp    3. HL    4. Obesity    F/u in 6 mos    Questions/concerns were discussed with patient and/or family by bedside.      Francisco Castanon  2020

## 2025-01-15 DIAGNOSIS — E11.8 CONTROLLED TYPE 2 DIABETES MELLITUS WITH COMPLICATION, WITHOUT LONG-TERM CURRENT USE OF INSULIN (HCC): ICD-10-CM

## 2025-01-15 RX ORDER — LANCETS
1 EACH MISCELLANEOUS 2 TIMES DAILY
Qty: 200 EACH | Refills: 0 | Status: SHIPPED | OUTPATIENT
Start: 2025-01-15

## 2025-01-15 NOTE — TELEPHONE ENCOUNTER
Medication(s) to Refill:   Requested Prescriptions     Pending Prescriptions Disp Refills    Accu-Chek Softclix Lancets Does not apply Misc 200 each 0     Si Lancet by Finger stick route 2 (two) times daily.         Last Time Medication was Filled:  4/15/24      Last Office Visit with PCP: 24 - virtual visit    When Patient was Due Back to the Office:  3 months  (from when PCP last addressed condition)    Future Appointments:  No future appointments.      Last Blood Pressures:  BP Readings from Last 2 Encounters:   01/10/25 108/80   24 136/70

## 2025-01-24 ENCOUNTER — TELEPHONE (OUTPATIENT)
Dept: FAMILY MEDICINE CLINIC | Facility: CLINIC | Age: 75
End: 2025-01-24

## 2025-01-24 NOTE — TELEPHONE ENCOUNTER
Patient states that she has a vent/CPAP that was prescribed while she was in the hospital Patient states that company will be faxing over new order as they will be replacing the machine.  Patient had pulmonologist while in hospital and hasn't seen one since 2020.

## 2025-01-25 ENCOUNTER — PATIENT MESSAGE (OUTPATIENT)
Dept: FAMILY MEDICINE CLINIC | Facility: CLINIC | Age: 75
End: 2025-01-25

## 2025-01-30 ENCOUNTER — PATIENT MESSAGE (OUTPATIENT)
Dept: FAMILY MEDICINE CLINIC | Facility: CLINIC | Age: 75
End: 2025-01-30

## 2025-01-31 ENCOUNTER — TELEPHONE (OUTPATIENT)
Dept: FAMILY MEDICINE CLINIC | Facility: CLINIC | Age: 75
End: 2025-01-31

## 2025-01-31 NOTE — TELEPHONE ENCOUNTER
Patient is calling to ask if there will be a change to medication?  Patient sent sugar readings for the last two weeks on 01/25/2025.

## 2025-02-02 DIAGNOSIS — I10 PRIMARY HYPERTENSION: ICD-10-CM

## 2025-02-04 RX ORDER — LISINOPRIL 10 MG/1
10 TABLET ORAL DAILY
Qty: 90 TABLET | Refills: 0 | Status: SHIPPED | OUTPATIENT
Start: 2025-02-04

## 2025-02-04 NOTE — TELEPHONE ENCOUNTER
Requesting Lisinopril 10mg  LOV: 12/19/24 Telemedicine  RTC: 3 months  Last Relevant Labs: 11/23/24  Filled: 11/21/24 #90 with 0 refills    No future appointments.    Hypertension Medications Protocol [id number]/04/2025 12:52 PM   Protocol Details   EGFRCR or GFRNAA > 50    CMP or BMP in past 12 months    Last BP reading less than 140/90    In person appointment or virtual visit in the past 12 mos or appointment in next 3 mos    Medication is active on med list     Rx sent to pharmacy per protocol

## 2025-02-19 LAB
CREATININE, RANDOM URINE: 11 MG/DL (ref 20–275)
HEMOGLOBIN A1C: 8.8 % OF TOTAL HGB
MICROALBUMIN/CREATININE RATIO, RANDOM URINE: 18 MG/G CREAT
MICROALBUMIN: 0.2 MG/DL

## 2025-02-24 DIAGNOSIS — E11.8 CONTROLLED TYPE 2 DIABETES MELLITUS WITH COMPLICATION, WITHOUT LONG-TERM CURRENT USE OF INSULIN (HCC): ICD-10-CM

## 2025-02-25 RX ORDER — LANCETS
1 EACH MISCELLANEOUS 2 TIMES DAILY
Qty: 200 EACH | Refills: 0 | Status: SHIPPED | OUTPATIENT
Start: 2025-02-25

## 2025-02-25 NOTE — TELEPHONE ENCOUNTER
Requested Renewals     Accu-Chek Softclix Lancets Does not apply Misc         Si Lancet by Finger stick route 2 (two) times daily.    Disp: 200 each    Refills: 0    Start: 2025    Class: Normal    Non-formulary For: Controlled type 2 diabetes mellitus with complication, without long-term current use of insulin (HCC)    Last ordered: 1 month ago (1/15/2025) by Oneida James MD    Diabetic Supplies Protocol [id number] 05:28 PM   Protocol Details In person appointment or virtual visit in the past 12 mos or appointment in next 3 mos    Medication is active on med list             Future Appointments   Date Time Provider Department Center   3/1/2025 10:00 AM Oneida James MD [facility] [address]

## 2025-02-26 ENCOUNTER — TELEPHONE (OUTPATIENT)
Dept: ADMINISTRATIVE | Age: 75
End: 2025-02-26

## 2025-02-26 DIAGNOSIS — G47.33 OSA ON CPAP: Primary | ICD-10-CM

## 2025-02-26 NOTE — TELEPHONE ENCOUNTER
Patient request new referral  to see Pulmonology (Johnathan)please review and sign plan and care if you agree Thank you.      Olya SAGE  [facility].

## 2025-03-01 ENCOUNTER — OFFICE VISIT (OUTPATIENT)
Dept: FAMILY MEDICINE CLINIC | Facility: CLINIC | Age: 75
End: 2025-03-01
Payer: MEDICARE

## 2025-03-01 VITALS
TEMPERATURE: 97 F | DIASTOLIC BLOOD PRESSURE: 70 MMHG | WEIGHT: 252 LBS | HEIGHT: 64 IN | OXYGEN SATURATION: 98 % | SYSTOLIC BLOOD PRESSURE: 132 MMHG | BODY MASS INDEX: 43.02 KG/M2 | HEART RATE: 85 BPM | RESPIRATION RATE: 16 BRPM

## 2025-03-01 DIAGNOSIS — I10 PRIMARY HYPERTENSION: ICD-10-CM

## 2025-03-01 DIAGNOSIS — E78.2 MIXED HYPERLIPIDEMIA: ICD-10-CM

## 2025-03-01 DIAGNOSIS — E11.22 TYPE 2 DIABETES MELLITUS WITH STAGE 4 CHRONIC KIDNEY DISEASE, WITHOUT LONG-TERM CURRENT USE OF INSULIN (HCC): Primary | ICD-10-CM

## 2025-03-01 DIAGNOSIS — N18.4 TYPE 2 DIABETES MELLITUS WITH STAGE 4 CHRONIC KIDNEY DISEASE, WITHOUT LONG-TERM CURRENT USE OF INSULIN (HCC): Primary | ICD-10-CM

## 2025-03-01 DIAGNOSIS — N18.4 CKD (CHRONIC KIDNEY DISEASE) STAGE 4, GFR 15-29 ML/MIN (HCC): ICD-10-CM

## 2025-03-01 DIAGNOSIS — G47.33 OSA ON CPAP: ICD-10-CM

## 2025-03-01 PROCEDURE — 1159F MED LIST DOCD IN RCRD: CPT | Performed by: STUDENT IN AN ORGANIZED HEALTH CARE EDUCATION/TRAINING PROGRAM

## 2025-03-01 PROCEDURE — 3061F NEG MICROALBUMINURIA REV: CPT | Performed by: STUDENT IN AN ORGANIZED HEALTH CARE EDUCATION/TRAINING PROGRAM

## 2025-03-01 PROCEDURE — 1160F RVW MEDS BY RX/DR IN RCRD: CPT | Performed by: STUDENT IN AN ORGANIZED HEALTH CARE EDUCATION/TRAINING PROGRAM

## 2025-03-01 PROCEDURE — 3078F DIAST BP <80 MM HG: CPT | Performed by: STUDENT IN AN ORGANIZED HEALTH CARE EDUCATION/TRAINING PROGRAM

## 2025-03-01 PROCEDURE — 99214 OFFICE O/P EST MOD 30 MIN: CPT | Performed by: STUDENT IN AN ORGANIZED HEALTH CARE EDUCATION/TRAINING PROGRAM

## 2025-03-01 PROCEDURE — 3075F SYST BP GE 130 - 139MM HG: CPT | Performed by: STUDENT IN AN ORGANIZED HEALTH CARE EDUCATION/TRAINING PROGRAM

## 2025-03-01 PROCEDURE — 3008F BODY MASS INDEX DOCD: CPT | Performed by: STUDENT IN AN ORGANIZED HEALTH CARE EDUCATION/TRAINING PROGRAM

## 2025-03-01 PROCEDURE — 3052F HG A1C>EQUAL 8.0%<EQUAL 9.0%: CPT | Performed by: STUDENT IN AN ORGANIZED HEALTH CARE EDUCATION/TRAINING PROGRAM

## 2025-03-01 NOTE — PROGRESS NOTES
Subjective:      Chief Complaint   Patient presents with    Diabetes     F/up - labs done 2/18/25     HISTORY OF PRESENT ILLNESS  HPI  HPI obtained per patient report.  Huyen Renner is a pleasant 74 year old female presenting for a follow-up for her diabetes. She is here with her  today.   Her fasting BG levels have been averaging in the 130s. She notes that she sometimes does not administer her long-acting insulin dose when her bedtime BG level is in the 110s due to concern that her BG level will drop too  low.   She also states that she misunderstood that she was supposed to administer her short-acting insulin every 4 hours and schedule her meals with her insulin dose, so she has been eating more frequently throughout the day.     PAST PATIENT HISTORY  Past Medical History:    Acute congestive heart failure (HCC)    Acute diastolic heart failure (HCC)    Acute renal failure, unspecified acute renal failure type    JENNIFER (acute kidney injury)    ATN (acute tubular necrosis)    Bronchopneumonia, organism unspecified    Congestive heart disease (HCC)    Diabetes (HCC)    Essential hypertension    Hearing impairment    High blood pressure    High cholesterol    Hyperkalemia    Hyperlipidemia    Hyponatremia    Laboratory examination ordered as part of a routine general medical examination    Obesity    Osteopenia    Pure hypercholesterolemia    Sleep apnea    Thrombocytopenia    Visual impairment    Reading glasses     Past Surgical History:   Procedure Laterality Date    Cholecystectomy      Colonoscopy  2005    Colonoscopy N/A 05/01/2017    Procedure: COLONOSCOPY;  Surgeon: Mayo Sewell MD;  Location: [facility] ENDOSCOPY    Colonoscopy  11/2023    Colonoscopy N/A 12/27/2018    Procedure: COLONOSCOPY;  Surgeon: Mayo Sewell MD;  Location: [facility] ENDOSCOPY    Colonoscopy N/A 11/22/2023    Procedure: COLONOSCOPY;  Surgeon: Mayo Sewell MD;  Location: [facility] ENDOSCOPY    Hysterectomy  2000    partial  hysteectomy          Tonsillectomy         CURRENT MEDICATIONS  Medications Taking[1]    HEALTH MAINTENANCE  Immunization History   Administered Date(s) Administered    >=3 YRS TRI  MULTIDOSE VIAL (47959) FLU CLINIC 2014    Covid-19 Vaccine Pfizer 30 mcg/0.3 ml 2021, 2021, 10/06/2021    Covid-19 Vaccine Pfizer Bivalent 30mcg/0.3mL 10/29/2022    Covid-19 Vaccine Pfizer Darwin-Sucrose 30 mcg/0.3 ml 2022    FLU VAC High Dose 65 YRS & Older PRSV Free (11625) 2017, 2020, 2021, 10/17/2022, 2023    FLUAD High Dose 65 yr and older (48071) 09/10/2018, 2019, 10/19/2024    Fluvirin, 3 Years & >, Im 2014    Fluzone Vaccine Medicare () 2017    Influenza 10/02/2019    Moderna Covid-19 Vaccine 50mcg/0.5ml 12yrs+ 2023    Novavax Covid-19 Vaccine 5mcg/0.5ml 12yrs+ (6995-2658) 10/19/2024    Pneumococcal (Prevnar 13) 2017    Pneumovax 23 09/10/2018   Deferred Date(s) Deferred    Pneumovax 23 2016       ALLERGIES AND DRUG REACTIONS  Allergies[2]    Family History   Problem Relation Age of Onset    Diabetes Mother     Stroke Mother     Cancer Sister         breast    Diabetes Sister     Obesity Sister     Diabetes Brother     Obesity Brother      Social History     Socioeconomic History    Marital status:    Tobacco Use    Smoking status: Former     Current packs/day: 0.00     Types: Cigarettes     Quit date: 1987     Years since quittin.8    Smokeless tobacco: Never   Vaping Use    Vaping status: Never Used   Substance and Sexual Activity    Alcohol use: Not Currently    Drug use: Never   Other Topics Concern    Caffeine Concern No    Stress Concern No    Weight Concern No    Special Diet No    Exercise No    Seat Belt No       Review of Systems   All other systems reviewed and are negative.         Objective:      /70   Pulse 85   Temp 97.3 °F (36.3 °C) (Temporal)   Resp 16   Ht 5' 4\" (1.626 m)   Wt 252 lb (114.3 kg)    SpO2 98%   BMI 43.26 kg/m²   Body mass index is 43.26 kg/m².    Physical Exam  Vitals reviewed.   Constitutional:       General: She is not in acute distress.     Appearance: She is not ill-appearing, toxic-appearing or diaphoretic.   Cardiovascular:      Rate and Rhythm: Normal rate.   Pulmonary:      Effort: Pulmonary effort is normal.   Abdominal:      General: There is no distension.   Musculoskeletal:      Cervical back: Neck supple.   Neurological:      Mental Status: She is alert and oriented to person, place, and time.   Psychiatric:         Mood and Affect: Mood normal.            Assessment and Plan:      1. Type 2 diabetes mellitus with stage 4 chronic kidney disease, without long-term current use of insulin ([facility]) (Primary)  -     TSH W Reflex To Free T4; Future; Expected date: 06/01/2025  -     Hemoglobin A1C; Future; Expected date: 06/01/2025  -     TSH W Reflex To Free T4  -     Hemoglobin A1C  2. CKD (chronic kidney disease) stage 4, GFR 15-29 ml/min ([facility])  3. Primary hypertension  -     TSH W Reflex To Free T4; Future; Expected date: 06/01/2025  -     TSH W Reflex To Free T4  4. Mixed hyperlipidemia  -     TSH W Reflex To Free T4; Future; Expected date: 06/01/2025  -     Lipid Panel; Future; Expected date: 06/01/2025  -     TSH W Reflex To Free T4  -     Lipid Panel  5. HEATH on CPAP  -     PULMONARY - INTERNAL    Return in about 3 months (around 6/1/2025) for follow-up.    DM2  - c/b CKD  -  uncontrolled  - her A1C has further increased to 8.8, likely due to noncompliance with her insulin regimen  - clarified that her mealtime insulin is to be scheduled just before her regular mealtimes  - we discussed the following schedule:   fasting in the morning, check your blood sugar and administer mealtime insulin before breakfast (she eats breakfast first thing in the morning)  Just before lunch, check your blood sugar and administer mealtime insulin  Just before dinner, check your blood sugar and  How Many Skin Cancers Have You Had?: more than one administer mealtime insulin  At bedtime (usually 5 hours after dinner), check your blood sugar and administer your long-acting insulin. We discussed that she is recommended to administer her long-acting insulin as prescribed unless her bedtime BG level is <80s  - continue jardiance  - she was asked to update her annual labs in 3 months prior to her next follow-up visit    HTN  - controlled  - continue lisinopril    HLD  - taking pravastatin    HEATH on CPAP  - pulmonology referral provided per pt request today     Patient verbalized understanding of assessment and recommendations. All questions and concerns were addressed.    Electronically signed by Oneida James MD       [1]   Outpatient Medications Marked as Taking for the 3/1/25 encounter (Office Visit) with Oneida James MD   Medication Sig Dispense Refill    Accu-Chek Softclix Lancets Does not apply Misc 1 Lancet by Finger stick route 2 (two) times daily. 200 each 0    lisinopril 10 MG Oral Tab TAKE 1 TABLET EVERY DAY 90 tablet 0    Insulin Pen Needle (BD PEN NEEDLE SHANNAN 2ND GEN) 32G X 4 MM Does not apply Misc USE AS DIRECTED THREE TIMES A  each 1    insulin glargine (LANTUS SOLOSTAR) 100 UNIT/ML Subcutaneous Solution Pen-injector Inject 3 Units into the skin nightly. 15 mL 0    Pravastatin Sodium 80 MG Oral Tab TAKE 1 TABLET EVERY NIGHT 90 tablet 1    Blood Glucose Monitoring Suppl (ACCU-CHEK GUIDE ME) w/Device Does not apply Kit Take 1 Bottle by mouth in the morning, at noon, and at bedtime. DX E11.8 1 kit 3    Insulin Lispro, 1 Unit Dial, (HUMALOG KWIKPEN) 100 UNIT/ML Subcutaneous Solution Pen-injector Inject 3 Units into the skin 3 (three) times daily before meals. Do not take if your pre-meal blood sugar level is lower than 110 15 mL 0    empagliflozin (JARDIANCE) 10 MG Oral Tab Take 1 tablet (10 mg total) by mouth daily. 90 tablet 0    Alcohol Swabs (DROPSAFE ALCOHOL PREP) 70 % Does not apply Pads USE AS DIRECTED TWICE DAILY WHEN CHECKING BLOOD SUGAR 200  What Is The Reason For Today's Visit?: History of Non-Melanoma Skin Cancer each 3    torsemide 20 MG Oral Tab TAKE 1 TABLET EVERY DAY 90 tablet 1    Metamucil Fiber Oral Chew Tab Chew 1 tablet by mouth daily. 1 gummy daily      ACCU-CHEK GUIDE In Vitro Strip TEST BLOOD SUGAR TWO TIMES DAILY 200 strip 3    Blood Glucose Monitoring Suppl (TRUE METRIX AIR GLUCOSE METER) w/Device Does not apply Kit 1 each 2 (two) times a day. Diagnosis code: E11.8 1 kit 0    Glucose Blood (RELION TRUE METRIX TEST STRIPS) In Vitro Strip 1 strip by Finger stick route 2 (two) times a day. Diagnosis code: E11.8 100 each 0   [2] No Known Allergies

## 2025-03-08 DIAGNOSIS — N18.4 TYPE 2 DIABETES MELLITUS WITH STAGE 4 CHRONIC KIDNEY DISEASE, WITHOUT LONG-TERM CURRENT USE OF INSULIN (HCC): ICD-10-CM

## 2025-03-08 DIAGNOSIS — E11.22 TYPE 2 DIABETES MELLITUS WITH STAGE 4 CHRONIC KIDNEY DISEASE, WITHOUT LONG-TERM CURRENT USE OF INSULIN (HCC): ICD-10-CM

## 2025-03-08 DIAGNOSIS — N18.4 CKD (CHRONIC KIDNEY DISEASE) STAGE 4, GFR 15-29 ML/MIN (HCC): ICD-10-CM

## 2025-03-17 DIAGNOSIS — E11.8 CONTROLLED TYPE 2 DIABETES MELLITUS WITH COMPLICATION, WITHOUT LONG-TERM CURRENT USE OF INSULIN (HCC): ICD-10-CM

## 2025-03-17 RX ORDER — PEN NEEDLE, DIABETIC 32GX 5/32"
1 NEEDLE, DISPOSABLE MISCELLANEOUS 3 TIMES DAILY
Qty: 300 EACH | Refills: 1 | Status: SHIPPED | OUTPATIENT
Start: 2025-03-17

## 2025-03-17 RX ORDER — INSULIN LISPRO 100 [IU]/ML
3 INJECTION, SOLUTION INTRAVENOUS; SUBCUTANEOUS
Qty: 15 ML | Refills: 0 | Status: SHIPPED | OUTPATIENT
Start: 2025-03-17

## 2025-03-17 NOTE — TELEPHONE ENCOUNTER
Requesting Humalog Kwikpen  LOV: 3/1/25  RTC: 3 months  Last Relevant Labs: 2/18/25  Filled: 12/19/24 #15mL with 0 refills    Future Appointments   Date Time Provider Department Center   6/7/2025  9:30 AM Oneida James MD [facility] [address] [facility] [address]     Diabetes Medication Protocol [id number]/17/2025 03:57 PM   Protocol Details   Last A1C < 7.5 and within past 6 months    EGFRCR or GFRNAA > 50    In person appointment or virtual visit in the past 6 mos or appointment in next 3 mos    Microalbumin procedure in past 12 months or taking ACE/ARB    GFR in the past 12 months    Medication is active on med list     Rx sent to pharmacy per protocol

## 2025-03-20 RX ORDER — LANCETS
EACH MISCELLANEOUS
Qty: 200 EACH | Refills: 3 | Status: SHIPPED | OUTPATIENT
Start: 2025-03-20

## 2025-03-20 NOTE — TELEPHONE ENCOUNTER
Requesting Lancets  LOV: 3/1/25  RTC: 3 months  Last Relevant Labs: 2/18/25  Filled: 2/25/25 #200 with 0 refills    Future Appointments   Date Time Provider Department Center   6/7/2025  9:30 AM Oneida James MD [facility] [address] [facility] [address]       Diabetic Supplies Protocol Epahsv9503/20/2025 03:01 PM   Protocol Details   Medication is active on med list    In person appointment or virtual visit in the past 12 mos or appointment in next 3 mos        Rx sent to pharmacy per protocol

## 2025-03-31 LAB
ABSOLUTE BASOPHILS: 29 CELLS/UL (ref 0–200)
ABSOLUTE EOSINOPHILS: 88 CELLS/UL (ref 15–500)
ABSOLUTE LYMPHOCYTES: 2256 CELLS/UL (ref 850–3900)
ABSOLUTE MONOCYTES: 540 CELLS/UL (ref 200–950)
ABSOLUTE NEUTROPHILS: 4387 CELLS/UL (ref 1500–7800)
APPEARANCE: CLEAR
BASOPHILS: 0.4 %
BILIRUBIN: NEGATIVE
BUN/CREATININE RATIO: 23 (CALC) (ref 6–22)
BUN: 42 MG/DL (ref 7–25)
CALCIUM: 9.4 MG/DL (ref 8.6–10.4)
CARBON DIOXIDE: 27 MMOL/L (ref 20–32)
CHLORIDE: 101 MMOL/L (ref 98–110)
COLOR: YELLOW
CREATININE, RANDOM URINE: 19 MG/DL (ref 20–275)
CREATININE: 1.8 MG/DL (ref 0.6–1)
EGFR: 29 ML/MIN/1.73M2
EOSINOPHILS: 1.2 %
GLUCOSE: 175 MG/DL (ref 65–99)
HEMATOCRIT: 40.5 % (ref 35–45)
HEMOGLOBIN: 12.7 G/DL (ref 11.7–15.5)
KETONES: NEGATIVE
LYMPHOCYTES: 30.9 %
MAGNESIUM: 2.2 MG/DL (ref 1.5–2.5)
MCH: 29.3 PG (ref 27–33)
MCHC: 31.4 G/DL (ref 32–36)
MCV: 93.3 FL (ref 80–100)
MONOCYTES: 7.4 %
MPV: 11.2 FL (ref 7.5–12.5)
NEUTROPHILS: 60.1 %
NITRITE: NEGATIVE
OCCULT BLOOD: NEGATIVE
PARATHYROID HORMONE,$INTACT: 122 PG/ML (ref 16–77)
PHOSPHATE (AS PHOSPHORUS): 4 MG/DL (ref 2.1–4.3)
PLATELET COUNT: 220 THOUSAND/UL (ref 140–400)
POTASSIUM: 4.2 MMOL/L (ref 3.5–5.3)
PROTEIN, TOTAL, RANDOM UR: <4 MG/DL (ref 5–24)
PROTEIN: NEGATIVE
RDW: 13.3 % (ref 11–15)
RED BLOOD CELL COUNT: 4.34 MILLION/UL (ref 3.8–5.1)
SODIUM: 138 MMOL/L (ref 135–146)
SPECIFIC GRAVITY: 1.01 (ref 1–1.03)
VITAMIN D, 25-OH, TOTAL: 53 NG/ML (ref 30–100)
WHITE BLOOD CELL COUNT: 7.3 THOUSAND/UL (ref 3.8–10.8)

## 2025-04-02 RX ORDER — TORSEMIDE 20 MG/1
TABLET ORAL
Qty: 90 TABLET | Refills: 0 | Status: SHIPPED | OUTPATIENT
Start: 2025-04-02

## 2025-04-02 NOTE — TELEPHONE ENCOUNTER
Requesting Torsemide 20mg  LOV: 3/1/25  RTC: 3 months  Last Relevant Labs: 3/29/25  Filled: 11/5/24 #90 with 1 refills    Future Appointments   Date Time Provider Department Center   4/17/2025  9:40 AM Melvin Mann MD [facility] [id number] [facility] [place]   6/7/2025  9:30 AM Oneida James MD [facility] [address] [facility] [address]     Hypertension Medications Protocol Wrwpzu7204/02/2025 09:53 AM   Protocol Details   EGFRCR or GFRNAA > 50    CMP or BMP in past 12 months    Last BP reading less than 140/90    In person appointment or virtual visit in the past 12 mos or appointment in next 3 mos    Medication is active on med list     Rx sent to pharmacy per protocol

## 2025-04-03 ENCOUNTER — PATIENT MESSAGE (OUTPATIENT)
Dept: FAMILY MEDICINE CLINIC | Facility: CLINIC | Age: 75
End: 2025-04-03

## 2025-04-08 DIAGNOSIS — N18.4 CKD (CHRONIC KIDNEY DISEASE) STAGE 4, GFR 15-29 ML/MIN (HCC): ICD-10-CM

## 2025-04-08 DIAGNOSIS — E11.22 TYPE 2 DIABETES MELLITUS WITH STAGE 4 CHRONIC KIDNEY DISEASE, WITHOUT LONG-TERM CURRENT USE OF INSULIN (HCC): ICD-10-CM

## 2025-04-08 DIAGNOSIS — N18.4 TYPE 2 DIABETES MELLITUS WITH STAGE 4 CHRONIC KIDNEY DISEASE, WITHOUT LONG-TERM CURRENT USE OF INSULIN (HCC): ICD-10-CM

## 2025-04-09 ENCOUNTER — PATIENT MESSAGE (OUTPATIENT)
Dept: FAMILY MEDICINE CLINIC | Facility: CLINIC | Age: 75
End: 2025-04-09

## 2025-04-10 DIAGNOSIS — E11.22 TYPE 2 DIABETES MELLITUS WITH STAGE 4 CHRONIC KIDNEY DISEASE, WITHOUT LONG-TERM CURRENT USE OF INSULIN (HCC): ICD-10-CM

## 2025-04-10 DIAGNOSIS — N18.4 TYPE 2 DIABETES MELLITUS WITH STAGE 4 CHRONIC KIDNEY DISEASE, WITHOUT LONG-TERM CURRENT USE OF INSULIN (HCC): ICD-10-CM

## 2025-04-10 DIAGNOSIS — N18.4 CKD (CHRONIC KIDNEY DISEASE) STAGE 4, GFR 15-29 ML/MIN (HCC): ICD-10-CM

## 2025-04-10 RX ORDER — EMPAGLIFLOZIN 10 MG/1
10 TABLET, FILM COATED ORAL DAILY
Qty: 90 TABLET | Refills: 0 | Status: SHIPPED | OUTPATIENT
Start: 2025-04-10

## 2025-04-10 RX ORDER — KETOROLAC TROMETHAMINE 30 MG/ML
1 INJECTION, SOLUTION INTRAMUSCULAR; INTRAVENOUS DAILY
Qty: 1 EACH | Refills: 0 | Status: SHIPPED | OUTPATIENT
Start: 2025-04-10

## 2025-04-10 RX ORDER — FLASH GLUCOSE SENSOR
1 KIT MISCELLANEOUS
Qty: 6 EACH | Refills: 0 | Status: SHIPPED | OUTPATIENT
Start: 2025-04-10

## 2025-04-10 NOTE — TELEPHONE ENCOUNTER
Requesting Jardiance 10mg  LOV: 3/1/25  RTC: 3 months  Last Relevant Labs: 2/18/25  Filled: 12/13/24 #90 with 0 refills    Future Appointments   Date Time Provider Department Center   4/17/2025  9:40 AM Melvin Mann MD [facility] [id number] [facility] [place]   6/7/2025  9:30 AM Oneida James MD [facility] [address] [facility] [address]     Diabetes Medication Protocol Sbxduk04/10/2025 11:15 AM   Protocol Details   Last A1C < 7.5 and within past 6 months    EGFRCR or GFRNAA > 50    In person appointment or virtual visit in the past 6 mos or appointment in next 3 mos    Microalbumin procedure in past 12 months or taking ACE/ARB    GFR in the past 12 months    Medication is active on med list     Rx sent to pharmacy per protocol

## 2025-04-12 ENCOUNTER — PATIENT MESSAGE (OUTPATIENT)
Dept: FAMILY MEDICINE CLINIC | Facility: CLINIC | Age: 75
End: 2025-04-12

## 2025-04-14 RX ORDER — INSULIN LISPRO 100 [IU]/ML
3 INJECTION, SOLUTION INTRAVENOUS; SUBCUTANEOUS
Qty: 15 ML | Refills: 0 | Status: SHIPPED | OUTPATIENT
Start: 2025-04-14

## 2025-04-14 RX ORDER — PEN NEEDLE, DIABETIC 32GX 5/32"
1 NEEDLE, DISPOSABLE MISCELLANEOUS 4 TIMES DAILY
Qty: 400 EACH | Refills: 1 | Status: SHIPPED | OUTPATIENT
Start: 2025-04-14

## 2025-04-14 NOTE — TELEPHONE ENCOUNTER
Requesting Humalog 100units  Filled: 3/17/25 #15ml with 0 refills    Requesting Pen needles  Filled: 3/17/25 #300 with 1 refills    LOV: 3/1/25  RTC: 3 months  Last Relevant Labs: 2/18/25    Future Appointments   Date Time Provider Department Center   4/17/2025  9:40 AM Melvin Mann MD [facility] [id number] [facility] [place]   6/7/2025  9:30 AM Oneida James MD [facility] [address]     Diabetes Medication Protocol Bjusxf19/10/2025 07:27 AM   Protocol Details   Last A1C < 7.5 and within past 6 months    EGFRCR or GFRNAA > 50    In person appointment or virtual visit in the past 6 mos or appointment in next 3 mos    Microalbumin procedure in past 12 months or taking ACE/ARB    GFR in the past 12 months    Medication is active on med list     Rx sent to pharmacy per protocol

## 2025-04-17 ENCOUNTER — OFFICE VISIT (OUTPATIENT)
Facility: CLINIC | Age: 75
End: 2025-04-17
Payer: MEDICARE

## 2025-04-17 VITALS
RESPIRATION RATE: 16 BRPM | HEART RATE: 72 BPM | DIASTOLIC BLOOD PRESSURE: 74 MMHG | SYSTOLIC BLOOD PRESSURE: 126 MMHG | OXYGEN SATURATION: 98 % | WEIGHT: 248 LBS | HEIGHT: 64 IN | BODY MASS INDEX: 42.34 KG/M2

## 2025-04-17 DIAGNOSIS — E66.2 OBESITY HYPOVENTILATION SYNDROME (HCC): ICD-10-CM

## 2025-04-17 DIAGNOSIS — G47.33 OSA (OBSTRUCTIVE SLEEP APNEA): Primary | ICD-10-CM

## 2025-04-17 PROCEDURE — 99204 OFFICE O/P NEW MOD 45 MIN: CPT | Performed by: INTERNAL MEDICINE

## 2025-04-17 PROCEDURE — 1159F MED LIST DOCD IN RCRD: CPT | Performed by: INTERNAL MEDICINE

## 2025-04-17 PROCEDURE — 1160F RVW MEDS BY RX/DR IN RCRD: CPT | Performed by: INTERNAL MEDICINE

## 2025-04-17 PROCEDURE — 3078F DIAST BP <80 MM HG: CPT | Performed by: INTERNAL MEDICINE

## 2025-04-17 PROCEDURE — 3074F SYST BP LT 130 MM HG: CPT | Performed by: INTERNAL MEDICINE

## 2025-04-17 PROCEDURE — 3008F BODY MASS INDEX DOCD: CPT | Performed by: INTERNAL MEDICINE

## 2025-04-17 NOTE — H&P
[facility] General Pulmonary Consult Note    History of Present Illness:  Huyen Renner is a 74 year old female former smoker (quit , 19 pack years) with significant PMH of CHF, DM, HTN, HEATH who presents today for evaluation of HEATH. She was previously seen in the hospital in 2019 with hypercapnia, found to have heart failure exacerbation and sleep apnea, started on AVAPS with improvement. She was told to obtain sleep study and follow up with sleep team but never did. Now presents today for follow up. Denies any concerns. No cough, dyspnea, wheeze, pain. No fevers  Using AVAPS nightly with 3L o2    Past Medical History:   Past Medical History[1]     Past Surgical History: Past Surgical History[2]    Family Medical History: Family History[3]     Social History:   Social History     Socioeconomic History    Marital status:      Spouse name: Not on file    Number of children: Not on file    Years of education: Not on file    Highest education level: Not on file   Occupational History    Not on file   Tobacco Use    Smoking status: Former     Current packs/day: 0.00     Average packs/day: 1 pack/day for 19.0 years (19.0 ttl pk-yrs)     Types: Cigarettes     Start date:      Quit date:      Years since quittin.3    Smokeless tobacco: Never   Vaping Use    Vaping status: Never Used   Substance and Sexual Activity    Alcohol use: Not Currently    Drug use: Never    Sexual activity: Not on file   Other Topics Concern     Service Not Asked    Blood Transfusions Not Asked    Caffeine Concern No    Occupational Exposure Not Asked    Hobby Hazards Not Asked    Sleep Concern Not Asked    Stress Concern No    Weight Concern No    Special Diet No    Back Care Not Asked    Exercise No    Bike Helmet Not Asked    Seat Belt No    Self-Exams Not Asked   Social History Narrative    Not on file     Social Drivers of Health     Food Insecurity: Not on file   Transportation Needs: Not on file   Stress: Not on  file   Housing Stability: Not on file        Allergies: Patient has no known allergies.     Medications: Current Medications[4]    Review of Systems: Review of Systems   Constitutional: Negative.    HENT: Negative.     Respiratory: Negative.     All other systems reviewed and are negative.      Physical Exam:  /74 (BP Location: Right arm, Patient Position: Sitting, Cuff Size: adult)   Pulse 72   Resp 16   Ht 5' 4\" (1.626 m)   Wt 248 lb (112.5 kg)   SpO2 98%   BMI 42.57 kg/m²      Constitutional: alert, cooperative. No acute distress.  HEENT: Head NC/AT.    Cardio: Regular rate and rhythm. Normal S1 and S2. No murmurs.   Respiratory: Thorax symmetrical with no labored breathing. Clear to ausculation bilaterally with symmetrical breath sounds. No wheezing, rhonchi, rales, or crackles.   GI: NABS. Abd soft, non-tender.  Extremities: No clubbing or cyanosis. No BLE edema.    Neurologic: A&Ox3. No gross motor deficits.  Skin: Warm, dry  Psych: Calm, cooperative. Pleasant affect.    Results:  Personally reviewed  WBC: 7.3, done on 3/29/2025.  HGB: 12.7, done on 3/29/2025.  PLT: 220, done on 3/29/2025.     Glucose: 175, done on 3/29/2025.  Cr: 1.8, done on 3/29/2025.  Last eGFR was 29 on 3/29/2025.  CA: 9.4, done on 3/29/2025.  Na: 138, done on 3/29/2025.  K: 4.2, done on 3/29/2025.  Cl: 101, done on 3/29/2025.  CO2: 27, done on 3/29/2025.  Last ALB was 4.4% done on 8/27/2024.     No results found.     Assessment/Plan:  #1. HEATH, OHS  Diagnosed in hospital 12/2019  Managed on AVAPS nightly  I am not a sleep doctor- she needs to have sleep physician evaluation. She and her  were informed of this      Melvin Mann MD  4/17/2025         [1]   Past Medical History:   Acute congestive heart failure (HCC)    Acute diastolic heart failure (HCC)    Acute renal failure, unspecified acute renal failure type    JENNIFER (acute kidney injury)    ATN (acute tubular necrosis)    Bronchopneumonia, organism unspecified     Congestive heart disease (HCC)    Diabetes (HCC)    Essential hypertension    Hearing impairment    High blood pressure    High cholesterol    Hyperkalemia    Hyperlipidemia    Hyponatremia    Laboratory examination ordered as part of a routine general medical examination    Obesity    Osteopenia    Pure hypercholesterolemia    Sleep apnea    Thrombocytopenia    Visual impairment    Reading glasses   [2]   Past Surgical History:  Procedure Laterality Date    Cholecystectomy      Colonoscopy      Colonoscopy N/A 2017    Procedure: COLONOSCOPY;  Surgeon: Mayo Sewell MD;  Location: [facility] ENDOSCOPY    Colonoscopy  2023    Colonoscopy N/A 2018    Procedure: COLONOSCOPY;  Surgeon: Mayo Sewell MD;  Location: [facility] ENDOSCOPY    Colonoscopy N/A 2023    Procedure: COLONOSCOPY;  Surgeon: Mayo Sewell MD;  Location: [facility] ENDOSCOPY    Hysterectomy      partial hysteectomy          Tonsillectomy     [3]   Family History  Problem Relation Age of Onset    Diabetes Mother     Stroke Mother     Cancer Sister         breast    Diabetes Sister     Obesity Sister     Diabetes Brother     Obesity Brother    [4]   Current Outpatient Medications   Medication Sig Dispense Refill    Insulin Lispro, 1 Unit Dial, (HUMALOG KWIKPEN) 100 UNIT/ML Subcutaneous Solution Pen-injector Inject 3 Units into the skin 3 (three) times daily before meals. Do not take if your pre-meal blood sugar level is lower than 110 15 mL 0    Insulin Pen Needle (BD PEN NEEDLE SHANNAN 2ND GEN) 32G X 4 MM Does not apply Misc 1 each in the morning, at noon, in the evening, and at bedtime. 400 each 1    empagliflozin (JARDIANCE) 10 MG Oral Tab TAKE 1 TABLET EVERY DAY 90 tablet 0    Continuous Glucose Sensor (FREESTYLE NICHELLE 14 DAY SENSOR) Does not apply Misc 1 Application every 14 (fourteen) days. 6 each 0    Continuous Glucose  (FREESTYLE NICHELLE 3 READER) Does not apply Device 1 kit daily. 1 each 0    torsemide 20 MG Oral Tab  TAKE 1 TABLET EVERY DAY 90 tablet 0    ACCU-CHEK SOFTCLIX LANCETS Does not apply Misc TEST BLOOD SUGAR IN THE MORNING AND BEFORE BEDTIME AS DIRECTED 200 each 3    lisinopril 10 MG Oral Tab TAKE 1 TABLET EVERY DAY 90 tablet 0    insulin glargine (LANTUS SOLOSTAR) 100 UNIT/ML Subcutaneous Solution Pen-injector Inject 3 Units into the skin nightly. 15 mL 0    Pravastatin Sodium 80 MG Oral Tab TAKE 1 TABLET EVERY NIGHT 90 tablet 1    Blood Glucose Monitoring Suppl (ACCU-CHEK GUIDE ME) w/Device Does not apply Kit Take 1 Bottle by mouth in the morning, at noon, and at bedtime. DX E11.8 1 kit 3    Alcohol Swabs (DROPSAFE ALCOHOL PREP) 70 % Does not apply Pads USE AS DIRECTED TWICE DAILY WHEN CHECKING BLOOD SUGAR 200 each 3    ACCU-CHEK GUIDE In Vitro Strip TEST BLOOD SUGAR TWO TIMES DAILY 200 strip 3    Blood Glucose Monitoring Suppl (TRUE METRIX AIR GLUCOSE METER) w/Device Does not apply Kit 1 each 2 (two) times a day. Diagnosis code: E11.8 1 kit 0    Glucose Blood (RELION TRUE METRIX TEST STRIPS) In Vitro Strip 1 strip by Finger stick route 2 (two) times a day. Diagnosis code: E11.8 100 each 0    Metamucil Fiber Oral Chew Tab Chew 1 tablet by mouth daily. 1 gummy daily

## 2025-04-18 DIAGNOSIS — I10 PRIMARY HYPERTENSION: ICD-10-CM

## 2025-04-21 RX ORDER — LISINOPRIL 10 MG/1
10 TABLET ORAL DAILY
Qty: 90 TABLET | Refills: 0 | Status: SHIPPED | OUTPATIENT
Start: 2025-04-21

## 2025-04-21 NOTE — TELEPHONE ENCOUNTER
Requesting Lisinopril 10mg  LOV: 3/1/25  RTC: 3 months  Last Relevant Labs: 3/29/25  Filled: 2/4/25 #90 with 0 refills    Future Appointments   Date Time Provider Department Center   6/7/2025  9:30 AM Oneida James MD [facility] [address]     Hypertension Medications Protocol Htfceh5804/21/2025 04:01 PM   Protocol Details   EGFRCR or GFRNAA > 50    CMP or BMP in past 12 months    Last BP reading less than 140/90    In person appointment or virtual visit in the past 12 mos or appointment in next 3 mos    Medication is active on med lis     Rx sent to pharmacy per protocol

## 2025-04-24 ENCOUNTER — PATIENT MESSAGE (OUTPATIENT)
Dept: FAMILY MEDICINE CLINIC | Facility: CLINIC | Age: 75
End: 2025-04-24

## 2025-04-25 ENCOUNTER — TELEPHONE (OUTPATIENT)
Facility: CLINIC | Age: 75
End: 2025-04-25

## 2025-04-25 NOTE — TELEPHONE ENCOUNTER
Patient has been in contact with [facility] to get her machine switched out. The current one is not working properly and they plan on coming to pick it up tomorrow (4/26).They need a form filled out and faxed over from us prior.    Fax number is: [phone]

## 2025-04-25 NOTE — TELEPHONE ENCOUNTER
Left detailed message for pt that she needs to establish care with Dr. Contreras to manage AVAPS machine.  Form for replacement machine will not be signed by Dr. Mann.  Per Dr. Mann from visit notes on 4-17-25:   Diagnosed in hospital 12/2019  Managed on AVAPS nightly  I am not a sleep doctor- she needs to have sleep physician evaluation. She and her  were informed of this

## 2025-04-30 ENCOUNTER — OFFICE VISIT (OUTPATIENT)
Facility: CLINIC | Age: 75
End: 2025-04-30
Payer: MEDICARE

## 2025-04-30 VITALS
SYSTOLIC BLOOD PRESSURE: 126 MMHG | OXYGEN SATURATION: 95 % | HEIGHT: 64 IN | WEIGHT: 253 LBS | BODY MASS INDEX: 43.19 KG/M2 | DIASTOLIC BLOOD PRESSURE: 64 MMHG | TEMPERATURE: 98 F | HEART RATE: 65 BPM | RESPIRATION RATE: 16 BRPM

## 2025-04-30 DIAGNOSIS — N18.4 TYPE 2 DIABETES MELLITUS WITH STAGE 4 CHRONIC KIDNEY DISEASE, WITHOUT LONG-TERM CURRENT USE OF INSULIN (HCC): ICD-10-CM

## 2025-04-30 DIAGNOSIS — I10 ESSENTIAL HYPERTENSION: Primary | ICD-10-CM

## 2025-04-30 DIAGNOSIS — E11.22 TYPE 2 DIABETES MELLITUS WITH STAGE 4 CHRONIC KIDNEY DISEASE, WITHOUT LONG-TERM CURRENT USE OF INSULIN (HCC): ICD-10-CM

## 2025-04-30 DIAGNOSIS — G47.34 NOCTURNAL HYPOXEMIA: ICD-10-CM

## 2025-04-30 DIAGNOSIS — E66.2 OBESITY HYPOVENTILATION SYNDROME (HCC): ICD-10-CM

## 2025-04-30 PROCEDURE — 3008F BODY MASS INDEX DOCD: CPT | Performed by: INTERNAL MEDICINE

## 2025-04-30 PROCEDURE — 1160F RVW MEDS BY RX/DR IN RCRD: CPT | Performed by: INTERNAL MEDICINE

## 2025-04-30 PROCEDURE — 1159F MED LIST DOCD IN RCRD: CPT | Performed by: INTERNAL MEDICINE

## 2025-04-30 PROCEDURE — 99214 OFFICE O/P EST MOD 30 MIN: CPT | Performed by: INTERNAL MEDICINE

## 2025-04-30 PROCEDURE — 3074F SYST BP LT 130 MM HG: CPT | Performed by: INTERNAL MEDICINE

## 2025-04-30 PROCEDURE — 3078F DIAST BP <80 MM HG: CPT | Performed by: INTERNAL MEDICINE

## 2025-04-30 NOTE — PATIENT INSTRUCTIONS
Plan:    Arrange a NEW AVAPS PSMin 5-10 cwp and PS Max 15-25 cmH2O ,EPAP 10 cwp and rate 15/min regularly with  ml, with Oxygen 3 LPM   Then obtain download on AVAPS   Check Overnight oximetry on 3 LPM oxygen and AVAPS     Follow up: 6  months       Murray Contreras MD

## 2025-04-30 NOTE — PROGRESS NOTES
Pulmonary/Critical Care/Sleep Medicine    Consult Note     PCP: Oneida James MD   Phone: [phone]   Fax: [phone]     Ref Provider: Oneida James     Chief Complaint   Patient presents with    Obstructive Sleep Apnea (HEATH)     Pt here for sleep consult, sleep questioner 2/24       HPI  I had the pleasure of seeing Huyen Renner who is a pleasant 74 year old female who presents for evaluation of sleep disorder currently on AVAPS.      The patient states that she has snored at home in past and  has been diagnosed with possible HEATH when she was hospitalized.  She was noted to have chronic hypercapnic respiratory failure in 2019 and was started on NIV along with oxygen 3 L/min via nasal cannula..,  She has an old NIV machine with 3 LPM oxygen has never been adjusted  since 2019, so she presents for sleep/pulmonary evaluation.      She states bed time around 9 PM . It takes 30 min  to fall asleep and leaves bed around 6 AM. She wakes up sometimes 2-3  times a night to go to bathroom..  She is somewhat sleepy and fatigued during the daytime.  She denies nightmares, sleep talking or sleep walking.  She  denies AM headaches.  She denies symptoms sleep attacks     The patient denies kicking legs at night. Denies  teeth grinding.    The patient reports using trilogy AVAPS daily at night at PSMin 5-10 cwp and PS Max 15-25 cmH2O ,EPAP 10 cwp and rate 15/min regularly with  ml, throughout the night for about 9 hours      She drinks 1  cups of caffeine coffee daily       She has pets 1 dog  that does sleep in bed.       Hx of tobacco use: She  reports that she quit smoking about 40 years ago. Her smoking use included cigarettes. She started smoking about 59 years ago. She has a 19 pack-year smoking history. She has never used smokeless tobacco.    Past Medical History[1]   Past Surgical History[2]  Allergies[3]  Current Medications[4]   Short Social Hx on File[5]   Immunization History   Administered Date(s)  Administered    >=3 YRS TRI  MULTIDOSE VIAL (59125) FLU CLINIC 11/24/2014    Covid-19 Vaccine Pfizer 30 mcg/0.3 ml 02/01/2021, 02/22/2021, 10/06/2021    Covid-19 Vaccine Pfizer Bivalent 30mcg/0.3mL 10/29/2022    Covid-19 Vaccine Pfizer Darwin-Sucrose 30 mcg/0.3 ml 07/30/2022    FLU VAC High Dose 65 YRS & Older PRSV Free (71298) 09/07/2017, 09/13/2020, 09/20/2021, 10/17/2022, 09/18/2023    FLUAD High Dose 65 yr and older (14877) 09/10/2018, 09/28/2019, 10/19/2024    Fluvirin, 3 Years & >, Im 11/24/2014    Fluzone Vaccine Medicare () 09/07/2017    Influenza 10/02/2019    Moderna Covid-19 Vaccine 50mcg/0.5ml 12yrs+ 09/30/2023    Novavax Covid-19 Vaccine 5mcg/0.5ml 12yrs+ (6593-3361) 10/19/2024    Pneumococcal (Prevnar 13) 06/20/2017    Pneumovax 23 09/10/2018   Deferred Date(s) Deferred    Pneumovax 23 08/16/2016      Family History[6]     Review of Systems   Constitutional:  Positive for fatigue. Negative for fever and unexpected weight change.   HENT:  Negative for congestion, mouth sores, nosebleeds, postnasal drip, rhinorrhea, sore throat and trouble swallowing.    Eyes:  Negative for visual disturbance.   Respiratory:  Negative for apnea, cough, choking, chest tightness, shortness of breath and wheezing.    Cardiovascular:  Negative for chest pain, palpitations and leg swelling.   Gastrointestinal:  Negative for abdominal pain, constipation, diarrhea, nausea and vomiting.   Genitourinary:  Negative for difficulty urinating.   Musculoskeletal:  Negative for arthralgias, back pain, gait problem and myalgias.   Neurological:  Negative for dizziness, weakness and headaches.   Psychiatric/Behavioral:  Negative for sleep disturbance.         Vitals:    04/30/25 0849   BP: 126/64   Pulse: 65   Resp: 16   Temp: 97.8 °F (36.6 °C)      SpO2: 95 %  Ht Readings from Last 1 Encounters:   04/30/25 5' 4\" (1.626 m)     Wt Readings from Last 1 Encounters:   04/30/25 253 lb (114.8 kg)     Body mass index is 43.43 kg/m².      Physical Exam  Constitutional:       General: She is not in acute distress.     Appearance: Normal appearance. She is obese. She is not ill-appearing or diaphoretic.   HENT:      Head: Normocephalic and atraumatic.      Nose: Nose normal. No congestion or rhinorrhea.      Comments: Narrow nares L>R     Mouth/Throat:      Mouth: Mucous membranes are moist.      Pharynx: Oropharynx is clear. No oropharyngeal exudate or posterior oropharyngeal erythema.      Comments: Mallampati class IV palate   Eyes:      Extraocular Movements: Extraocular movements intact.      Pupils: Pupils are equal, round, and reactive to light.   Cardiovascular:      Rate and Rhythm: Normal rate.      Pulses: Normal pulses.      Heart sounds: Normal heart sounds. No murmur heard.  Pulmonary:      Effort: Pulmonary effort is normal. No respiratory distress.      Breath sounds: Normal breath sounds. No wheezing or rhonchi.   Chest:      Chest wall: No tenderness.   Abdominal:      General: Abdomen is flat. Bowel sounds are normal.      Palpations: Abdomen is soft.   Musculoskeletal:         General: Normal range of motion.   Skin:     General: Skin is warm.   Neurological:      General: No focal deficit present.      Mental Status: She is alert and oriented to person, place, and time.   Psychiatric:         Mood and Affect: Mood normal.         Behavior: Behavior normal.         Thought Content: Thought content normal.         Judgment: Judgment normal.             Labs:  Last BMP  Lab Results   Component Value Date     (H) 03/29/2025    BUN 42 (H) 03/29/2025    CREATSERUM 1.80 (H) 03/29/2025    BUNCREA 23 (H) 03/29/2025    ANIONGAP 3 08/27/2024    GFRAA 49 (L) 06/13/2022    GFRNAA 42 (L) 06/13/2022    CA 9.4 03/29/2025     03/29/2025    K 4.2 03/29/2025     03/29/2025    CO2 27 03/29/2025    OSMOCALC 295 08/27/2024      Last CBC  Lab Results   Component Value Date    WBC 7.3 03/29/2025    RBC 4.34 03/29/2025    HGB 12.7  03/29/2025    HCT 40.5 03/29/2025    MCV 93.3 03/29/2025    MCH 29.3 03/29/2025    MCHC 31.4 (L) 03/29/2025    RDW 13.3 03/29/2025     03/29/2025      Last CMP  Lab Results   Component Value Date     (H) 03/29/2025    BUN 42 (H) 03/29/2025    BUNCREA 23 (H) 03/29/2025    CREATSERUM 1.80 (H) 03/29/2025    ANIONGAP 3 08/27/2024    GFR 55 (L) 06/02/2017    GFRNAA 42 (L) 06/13/2022    GFRAA 49 (L) 06/13/2022    CA 9.4 03/29/2025    OSMOCALC 295 08/27/2024    ALKPHO 77 08/27/2024    AST 18 08/27/2024    ALT 23 08/27/2024    BILT 0.3 08/27/2024    TP 7.4 08/27/2024    ALB 4.4 08/27/2024    GLOBULIN 3.0 08/27/2024    AGRATIO 1.4 08/10/2024     03/29/2025    K 4.2 03/29/2025     03/29/2025    CO2 27 03/29/2025      Last Thyroid Function  Lab Results   Component Value Date    T4F 1.1 02/28/2022    TSH 3.25 02/28/2022    TSHT4 3.36 03/09/2024      Ref Range & Units 12/8/19  7:01 AM   ABG pH  7.35 - 7.45 7.37   ABG pCO2  35 - 45 mm Hg 68 High Panic    ABG pO2  80 - 105 mm Hg 67 Low    ABG HCO3  22.0 - 26.0 mEq/L 38.6 High    ABG Base Excess  -2.0 - 2.0 mmol/L 10.0 High    ABG O2 Saturation  92 - 100 % 91 Low    Calculated O2 Saturation  92 - 100 % 93   Patient Temperature  F 97.5   Total Hemoglobin  11.7 - 16.0 g/dL 14.9   Carboxyhemoglobin  0.0 - 3.0 % SAT 1.8   Comment:  NORMALS:          NON-SMOKERS 0-3%          SMOKERS     0-10%   Methemoglobin  0.4 - 1.5 % SAT 0.5   P/F Ratio  mmHg 173.2   Ionized Calcium  1.12 - 1.32 mmol/L 1.17     Imaging:  No results found.  PROCEDURE:  XR CHEST AP PORTABLE  (CPT=71045)     TECHNIQUE:  AP chest radiograph was obtained.     COMPARISON:  [facility] , XR, XR CHEST AP/PA (1 VIEW) (CPT=71045), 12/26/2019, 7:57.  [facility] , XR, XR CHEST AP PORTABLE  (CPT=71045), 12/07/2019, 4:57.     INDICATIONS:  Temporary dialysis catheter insertion     PATIENT STATED HISTORY: (As transcribed by Technologist)  Patient offered no additional history at this time.         FINDINGS:   There has been placement of a temporary catheter.  The tip overlies the expected region of the superior vena cava.  No pleural effusion or pneumothorax is seen.  Heart size is mildly enlarged.  Low lung volumes noted.  Possible mild volume  overload.      Impression   CONCLUSION:  Right central line overlies the expected region of the superior vena cava.  No pleural effusion or pneumothorax is seen.  Possible mild volume overload present.  Continued follow-up suggested.           Dictated by: Dk Escudero MD on 2019 at 11:25          Asthma Control Test:   (In The Last 4 Weeks)     Asthma Control Test Score: 25  points out of 25 points      14 or less  Asthma is very poorly controlled    15-19  Asthma is not as controlled as it could be   20-25  Asthma is under control    [place] Sleepiness Scale: (ESS) score on today's visit is 2  out of 24.     Score total of 1-6    Normal sleep   Score total of 7-8    Average sleepiness   Score total of 9-24    Abnormal (possibly pathologic) sleepiness     Transthoracic Echocardiogram 2019    Name:Huyen Renner     Date: 2019 :  1950 Ht:  (65in)  BP: 100 / 55   MRN:  [id number]    Age:  69years    Wt:  (285lb) HR: 61bpm   Loc:  [facility]        Gndr: F          BSA: 2.3m^2     Sonographer: HARVEY Smith   Ordering:    Roman Mart MD   Consulting:  Reagan Patton   Referring:   Roman Mart MD     ----------------------------------------------------------------------------   History/Indications:  Dyspnea.  Congestive heart failure.  Risk factors:   Hypertension.     ----------------------------------------------------------------------------   Procedure information:  A transthoracic complete 2D study was performed.   Additional evaluation included M-mode, complete 2D, complete spectral   Doppler, color Doppler, and intravenous contrast injection. Inpatient. Room   8602. No prior study was available for comparison.    This was a routine   echocardiographic  study. Transthoracic echocardiography for diagnosis and   ventricular function evaluation. Image quality was poor. The study was   technically limited due to body habitus. Intravenous contrast (Definity) was   administered to opacify the LV.     ECG rhythm:   Normal sinus     ----------------------------------------------------------------------------     Conclusions:     1. Left ventricle: The cavity size was normal. Wall thickness was normal.      Systolic function was normal. The estimated ejection fraction was 60-65%.      There was no diagnostic evidence for regional wall motion abnormalities.      Doppler parameters are consistent with abnormal left ventricular      relaxation - grade 1 diastolic dysfunction. Doppler parameters are      consistent with elevated mean left atrial filling pressure.   2. Aortic valve: Trileaflet; mildly thickened, mildly calcified leaflets.      Transvalvular velocity was within the normal range. There was no      stenosis. No regurgitation.   3. Mitral valve: Mildly to moderately calcified annulus. Transvalvular      velocity was within the normal range. There was no evidence for stenosis.      There was no regurgitation.   4. Left atrium: The left atrium was mildly dilated. The left atrial volume      was mildly increased.   5. Right ventricle: The cavity size was mildly increased. Wall thickness      could not be accurately determined. Systolic function was normal.   6. Right atrium: The atrium was mildly dilated.   7. Pulmonary arteries: Systolic pressure could not be accurately estimated.     Impressions:  No previous study was available for comparison.   *     PFT 12/4/2019:  FEV1 of 1.68 or 61% of predicted.  FVC of 2.18 or 61% predicted with a Z-score of -2.44.  FEV1/FVC ratio of 65%.  This is spirometry was consistent with mild obstructive process with restrictive process.    Impression:  Chronic hypercapnic  respiratory failure, patient has been on [facility] trilogy since  2019 after she was hospitalized at [facility]  Nocturnal hypoxemia  Obesity hypoventilation syndrome.  Patient has Mallampati class IV palate and severe obesity she also appears to have obstructive sleep apnea syndrome.  She is already being treated with AVAPS so we do not need to pursue diagnostic evaluation for obstructive apnea syndrome.  Daytime hypersomnolence  Restrictive obesity.  Spirometry shows reduced forced vital capacity to suggest chest restriction.  Hx smoking 19 pack years of tobacco though stopped smoking in 1985       COPD: Mild obstructive process on spirometry 12/4/2019.  With FEV1 of 2.18 or 61% predicted and FEV1/FVC ratio of 65%  Obesity class III Body mass index is 43.43 kg/m².  History of diastolic heart failure: With echo showing LVEF of 60 to 65% and grade 1 diastolic dysfunction  Hypertension  Hyperlipidemia  Type 2 diabetes mellitus  Chronic kidney disease                  Plan:    Arrange a NEW AVAPS PSMin 5-10 cwp and PS Max 15-25 cmH2O ,EPAP 10 cwp and rate 15/min regularly with  ml, with Oxygen 3 LPM   Then obtain download on AVAPS   Check Overnight oximetry on 3 LPM oxygen and AVAPS     Follow up: 6  months     Thank you for allowing me to participate in your patient care.    LISS Contreras MD, FACP, FCCP, FAASM - Pulmonary/Critical care/Sleep Medicine  Please contact our office if you have any questions or concerns at [phone]    Note to the patient: The 21st Century Cures Act makes medical notes like these available to patients in the interest of transparency. However, be advised that this is a medical document. It is intended as peer to peer communication. It is written in medical language and may contain abbreviations or verbiage that are unfamiliar. It may appear blunt or direct. Medical documents are intended to carry relevant information, facts as evident, and clinical opinion of the practitioner.      Disclaimer: Components of this note were documented using  voice recognition system and are subject to errors not corrected at proofreading. Contact the author of this note for any clarifications         [1]   Past Medical History:   Acute congestive heart failure (HCC)    Acute diastolic heart failure (HCC)    Acute renal failure, unspecified acute renal failure type    JENNIFER (acute kidney injury)    ATN (acute tubular necrosis)    Bronchopneumonia, organism unspecified    Congestive heart disease (HCC)    Diabetes (HCC)    Essential hypertension    Hearing impairment    High blood pressure    High cholesterol    Hyperkalemia    Hyperlipidemia    Hyponatremia    Laboratory examination ordered as part of a routine general medical examination    Obesity    HEATH (obstructive sleep apnea)    on NIV    Osteopenia    Pure hypercholesterolemia    Sleep apnea    Thrombocytopenia    Visual impairment    Reading glasses   [2]   Past Surgical History:  Procedure Laterality Date    Cholecystectomy      Colonoscopy      Colonoscopy N/A 2017    Procedure: COLONOSCOPY;  Surgeon: Mayo Sewell MD;  Location: [facility] ENDOSCOPY    Colonoscopy  2023    Colonoscopy N/A 2018    Procedure: COLONOSCOPY;  Surgeon: Mayo Sewell MD;  Location: [facility] ENDOSCOPY    Colonoscopy N/A 2023    Procedure: COLONOSCOPY;  Surgeon: Mayo Sewell MD;  Location: [facility] ENDOSCOPY    Hysterectomy  2000    partial hysteectomy          Tonsillectomy     [3] No Known Allergies  [4]   Current Outpatient Medications   Medication Sig Dispense Refill    lisinopril 10 MG Oral Tab TAKE 1 TABLET EVERY DAY 90 tablet 0    Insulin Lispro, 1 Unit Dial, (HUMALOG KWIKPEN) 100 UNIT/ML Subcutaneous Solution Pen-injector Inject 3 Units into the skin 3 (three) times daily before meals. Do not take if your pre-meal blood sugar level is lower than 110 15 mL 0    Insulin Pen Needle (BD PEN NEEDLE SHANNAN 2ND GEN) 32G X 4 MM Does not apply Misc 1 each in the morning, at noon, in the evening, and at bedtime. 400  each 1    empagliflozin (JARDIANCE) 10 MG Oral Tab TAKE 1 TABLET EVERY DAY 90 tablet 0    Continuous Glucose Sensor (FREESTYLE NICHELLE 14 DAY SENSOR) Does not apply Misc 1 Application every 14 (fourteen) days. 6 each 0    Continuous Glucose  (FREESTYLE NICHELLE 3 READER) Does not apply Device 1 kit daily. 1 each 0    torsemide 20 MG Oral Tab TAKE 1 TABLET EVERY DAY 90 tablet 0    ACCU-CHEK SOFTCLIX LANCETS Does not apply Misc TEST BLOOD SUGAR IN THE MORNING AND BEFORE BEDTIME AS DIRECTED 200 each 3    insulin glargine (LANTUS SOLOSTAR) 100 UNIT/ML Subcutaneous Solution Pen-injector Inject 3 Units into the skin nightly. 15 mL 0    Pravastatin Sodium 80 MG Oral Tab TAKE 1 TABLET EVERY NIGHT 90 tablet 1    Blood Glucose Monitoring Suppl (ACCU-CHEK GUIDE ME) w/Device Does not apply Kit Take 1 Bottle by mouth in the morning, at noon, and at bedtime. DX E11.8 1 kit 3    Alcohol Swabs (DROPSAFE ALCOHOL PREP) 70 % Does not apply Pads USE AS DIRECTED TWICE DAILY WHEN CHECKING BLOOD SUGAR 200 each 3    Metamucil Fiber Oral Chew Tab Chew 1 tablet by mouth daily. 1 gummy daily      ACCU-CHEK GUIDE In Vitro Strip TEST BLOOD SUGAR TWO TIMES DAILY 200 strip 3    Blood Glucose Monitoring Suppl (TRUE METRIX AIR GLUCOSE METER) w/Device Does not apply Kit 1 each 2 (two) times a day. Diagnosis code: E11.8 1 kit 0    Glucose Blood (RELION TRUE METRIX TEST STRIPS) In Vitro Strip 1 strip by Finger stick route 2 (two) times a day. Diagnosis code: E11.8 100 each 0   [5]   Social History  Socioeconomic History    Marital status:    Occupational History    Occupation: Other or Not Employed     Comment:    Tobacco Use    Smoking status: Former     Current packs/day: 0.00     Average packs/day: 1 pack/day for 19.0 years (19.0 ttl pk-yrs)     Types: Cigarettes     Start date:      Quit date:      Years since quittin.3    Smokeless tobacco: Never   Vaping Use    Vaping status: Never Used   Substance  and Sexual Activity    Alcohol use: Not Currently    Drug use: Never   Other Topics Concern    Caffeine Concern No    Stress Concern No    Weight Concern No    Special Diet No    Exercise No    Seat Belt No   [6]   Family History  Problem Relation Age of Onset    Diabetes Mother     Stroke Mother     Other (cancer stomach) Father     Cancer Sister         breast    Diabetes Sister     Obesity Sister     Diabetes Brother     Obesity Brother

## 2025-05-02 RX ORDER — PEN NEEDLE, DIABETIC 32GX 5/32"
1 NEEDLE, DISPOSABLE MISCELLANEOUS 4 TIMES DAILY
Qty: 400 EACH | Refills: 1 | Status: ON HOLD | OUTPATIENT
Start: 2025-05-02

## 2025-05-02 RX ORDER — INSULIN GLARGINE 100 [IU]/ML
3 INJECTION, SOLUTION SUBCUTANEOUS NIGHTLY
Qty: 15 ML | Refills: 0 | Status: ON HOLD | OUTPATIENT
Start: 2025-05-02

## 2025-05-02 NOTE — TELEPHONE ENCOUNTER
Requesting Lantus  Filled: 1/7/25 #15mL with 0 refills    Requesting Pen needles  Filled: 4/14/25 #400 with 1 refills    LOV: 3/1/25  RTC: 3 months  Last Relevant Labs: 11/23/24    Future Appointments   Date Time Provider Department Center   6/7/2025  9:30 AM Oneida James MD [facility] [address] [facility] [address]   10/27/2025  9:00 AM Murray Contreras MD [facility] [place]     Diabetes Medication Protocol [id number]/02/2025 12:05 PM   Protocol Details   Last A1C < 7.5 and within past 6 months    EGFRCR or GFRNAA > 50    In person appointment or virtual visit in the past 6 mos or appointment in next 3 mos    Microalbumin procedure in past 12 months or taking ACE/ARB    GFR in the past 12 months    Medication is active on med list     Rx sent to pharmacy per protocol

## 2025-05-03 ENCOUNTER — HOSPITAL ENCOUNTER (INPATIENT)
Facility: HOSPITAL | Age: 75
LOS: 2 days | Discharge: HOME | End: 2025-05-06
Attending: EMERGENCY MEDICINE | Admitting: STUDENT IN AN ORGANIZED HEALTH CARE EDUCATION/TRAINING PROGRAM
Payer: MEDICARE

## 2025-05-03 DIAGNOSIS — K57.20 DIVERTICULITIS OF COLON WITH PERFORATION: Primary | ICD-10-CM

## 2025-05-03 LAB
ALBUMIN SERPL-MCNC: 4.3 G/DL (ref 3.2–4.8)
ALBUMIN/GLOB SERPL: 1.6 {RATIO} (ref 1–2)
ALP LIVER SERPL-CCNC: 68 U/L (ref 55–142)
ALT SERPL-CCNC: 11 U/L (ref 10–49)
ANION GAP SERPL CALC-SCNC: 13 MMOL/L (ref 0–18)
AST SERPL-CCNC: 13 U/L (ref ?–34)
BASOPHILS # BLD AUTO: 0.03 X10(3) UL (ref 0–0.2)
BASOPHILS NFR BLD AUTO: 0.2 %
BILIRUB SERPL-MCNC: 0.9 MG/DL (ref 0.2–1.1)
BUN BLD-MCNC: 26 MG/DL (ref 9–23)
CALCIUM BLD-MCNC: 9.1 MG/DL (ref 8.7–10.6)
CHLORIDE SERPL-SCNC: 97 MMOL/L (ref 98–112)
CO2 SERPL-SCNC: 24 MMOL/L (ref 21–32)
CREAT BLD-MCNC: 1.62 MG/DL (ref 0.55–1.02)
EGFRCR SERPLBLD CKD-EPI 2021: 33 ML/MIN/1.73M2 (ref 60–?)
EOSINOPHIL # BLD AUTO: 0.01 X10(3) UL (ref 0–0.7)
EOSINOPHIL NFR BLD AUTO: 0.1 %
ERYTHROCYTE [DISTWIDTH] IN BLOOD BY AUTOMATED COUNT: 13.7 %
GLOBULIN PLAS-MCNC: 2.7 G/DL (ref 2–3.5)
GLUCOSE BLD-MCNC: 199 MG/DL (ref 70–99)
HCT VFR BLD AUTO: 36.6 % (ref 35–48)
HGB BLD-MCNC: 12.2 G/DL (ref 12–16)
IMM GRANULOCYTES # BLD AUTO: 0.07 X10(3) UL (ref 0–1)
IMM GRANULOCYTES NFR BLD: 0.5 %
LYMPHOCYTES # BLD AUTO: 1.77 X10(3) UL (ref 1–4)
LYMPHOCYTES NFR BLD AUTO: 11.9 %
MCH RBC QN AUTO: 29.3 PG (ref 26–34)
MCHC RBC AUTO-ENTMCNC: 33.3 G/DL (ref 31–37)
MCV RBC AUTO: 87.8 FL (ref 80–100)
MONOCYTES # BLD AUTO: 1.66 X10(3) UL (ref 0.1–1)
MONOCYTES NFR BLD AUTO: 11.1 %
NEUTROPHILS # BLD AUTO: 11.36 X10 (3) UL (ref 1.5–7.7)
NEUTROPHILS # BLD AUTO: 11.36 X10(3) UL (ref 1.5–7.7)
NEUTROPHILS NFR BLD AUTO: 76.2 %
OSMOLALITY SERPL CALC.SUM OF ELEC: 288 MOSM/KG (ref 275–295)
PLATELET # BLD AUTO: 227 10(3)UL (ref 150–450)
POTASSIUM SERPL-SCNC: 3.8 MMOL/L (ref 3.5–5.1)
PROT SERPL-MCNC: 7 G/DL (ref 5.7–8.2)
RBC # BLD AUTO: 4.17 X10(6)UL (ref 3.8–5.3)
SODIUM SERPL-SCNC: 134 MMOL/L (ref 136–145)
WBC # BLD AUTO: 14.9 X10(3) UL (ref 4–11)

## 2025-05-04 ENCOUNTER — APPOINTMENT (OUTPATIENT)
Dept: CT IMAGING | Facility: HOSPITAL | Age: 75
End: 2025-05-04
Attending: EMERGENCY MEDICINE
Payer: MEDICARE

## 2025-05-04 PROBLEM — K57.32 SIGMOID DIVERTICULITIS: Status: ACTIVE | Noted: 2025-05-04

## 2025-05-04 PROBLEM — K57.20 DIVERTICULITIS OF COLON WITH PERFORATION: Status: ACTIVE | Noted: 2025-05-04

## 2025-05-04 LAB
BILIRUB UR QL STRIP.AUTO: NEGATIVE
GLUCOSE BLD-MCNC: 174 MG/DL (ref 70–99)
GLUCOSE BLD-MCNC: 200 MG/DL (ref 70–99)
GLUCOSE BLD-MCNC: 213 MG/DL (ref 70–99)
GLUCOSE BLD-MCNC: 231 MG/DL (ref 70–99)
GLUCOSE UR STRIP.AUTO-MCNC: >1000 MG/DL
KETONES UR STRIP.AUTO-MCNC: 10 MG/DL
LACTATE SERPL-SCNC: 1.3 MMOL/L (ref 0.5–2)
LEUKOCYTE ESTERASE UR QL STRIP.AUTO: 500
NITRITE UR QL STRIP.AUTO: NEGATIVE
PH UR STRIP.AUTO: 5.5 [PH] (ref 5–8)
RBC UR QL AUTO: NEGATIVE
SP GR UR STRIP.AUTO: 1.01 (ref 1–1.03)
UROBILINOGEN UR STRIP.AUTO-MCNC: NORMAL MG/DL

## 2025-05-04 PROCEDURE — 99223 1ST HOSP IP/OBS HIGH 75: CPT | Performed by: STUDENT IN AN ORGANIZED HEALTH CARE EDUCATION/TRAINING PROGRAM

## 2025-05-04 PROCEDURE — 74176 CT ABD & PELVIS W/O CONTRAST: CPT | Performed by: EMERGENCY MEDICINE

## 2025-05-04 PROCEDURE — 99222 1ST HOSP IP/OBS MODERATE 55: CPT | Performed by: STUDENT IN AN ORGANIZED HEALTH CARE EDUCATION/TRAINING PROGRAM

## 2025-05-04 RX ORDER — DEXTROSE MONOHYDRATE 25 G/50ML
50 INJECTION, SOLUTION INTRAVENOUS
Status: DISCONTINUED | OUTPATIENT
Start: 2025-05-04 | End: 2025-05-06

## 2025-05-04 RX ORDER — HYDROMORPHONE HYDROCHLORIDE 1 MG/ML
0.8 INJECTION, SOLUTION INTRAMUSCULAR; INTRAVENOUS; SUBCUTANEOUS EVERY 4 HOURS PRN
Status: DISCONTINUED | OUTPATIENT
Start: 2025-05-04 | End: 2025-05-04

## 2025-05-04 RX ORDER — METRONIDAZOLE 500 MG/1
500 TABLET ORAL 2 TIMES DAILY
Status: DISCONTINUED | OUTPATIENT
Start: 2025-05-04 | End: 2025-05-06

## 2025-05-04 RX ORDER — ACETAMINOPHEN 325 MG/1
650 TABLET ORAL EVERY 4 HOURS PRN
Status: DISCONTINUED | OUTPATIENT
Start: 2025-05-04 | End: 2025-05-06

## 2025-05-04 RX ORDER — HYDROCODONE BITARTRATE AND ACETAMINOPHEN 5; 325 MG/1; MG/1
1 TABLET ORAL EVERY 4 HOURS PRN
Status: DISCONTINUED | OUTPATIENT
Start: 2025-05-04 | End: 2025-05-06

## 2025-05-04 RX ORDER — HEPARIN SODIUM 5000 [USP'U]/ML
5000 INJECTION, SOLUTION INTRAVENOUS; SUBCUTANEOUS EVERY 12 HOURS SCHEDULED
Status: DISCONTINUED | OUTPATIENT
Start: 2025-05-04 | End: 2025-05-06

## 2025-05-04 RX ORDER — LISINOPRIL 10 MG/1
10 TABLET ORAL DAILY
Status: DISCONTINUED | OUTPATIENT
Start: 2025-05-04 | End: 2025-05-06

## 2025-05-04 RX ORDER — BENZONATATE 100 MG/1
200 CAPSULE ORAL 3 TIMES DAILY PRN
Status: DISCONTINUED | OUTPATIENT
Start: 2025-05-04 | End: 2025-05-06

## 2025-05-04 RX ORDER — HYDROCODONE BITARTRATE AND ACETAMINOPHEN 5; 325 MG/1; MG/1
2 TABLET ORAL EVERY 4 HOURS PRN
Status: DISCONTINUED | OUTPATIENT
Start: 2025-05-04 | End: 2025-05-06

## 2025-05-04 RX ORDER — HYDROMORPHONE HYDROCHLORIDE 1 MG/ML
0.2 INJECTION, SOLUTION INTRAMUSCULAR; INTRAVENOUS; SUBCUTANEOUS EVERY 4 HOURS PRN
Status: DISCONTINUED | OUTPATIENT
Start: 2025-05-04 | End: 2025-05-04

## 2025-05-04 RX ORDER — NICOTINE POLACRILEX 4 MG
15 LOZENGE BUCCAL
Status: DISCONTINUED | OUTPATIENT
Start: 2025-05-04 | End: 2025-05-06

## 2025-05-04 RX ORDER — TORSEMIDE 20 MG/1
20 TABLET ORAL DAILY
Status: DISCONTINUED | OUTPATIENT
Start: 2025-05-04 | End: 2025-05-06

## 2025-05-04 RX ORDER — ATORVASTATIN CALCIUM 20 MG/1
20 TABLET, FILM COATED ORAL NIGHTLY
Status: DISCONTINUED | OUTPATIENT
Start: 2025-05-04 | End: 2025-05-06

## 2025-05-04 RX ORDER — HYDROMORPHONE HYDROCHLORIDE 1 MG/ML
0.4 INJECTION, SOLUTION INTRAMUSCULAR; INTRAVENOUS; SUBCUTANEOUS EVERY 4 HOURS PRN
Status: DISCONTINUED | OUTPATIENT
Start: 2025-05-04 | End: 2025-05-04

## 2025-05-04 RX ORDER — ONDANSETRON 2 MG/ML
4 INJECTION INTRAMUSCULAR; INTRAVENOUS EVERY 6 HOURS PRN
Status: DISCONTINUED | OUTPATIENT
Start: 2025-05-04 | End: 2025-05-06

## 2025-05-04 RX ORDER — ECHINACEA PURPUREA EXTRACT 125 MG
1 TABLET ORAL
Status: DISCONTINUED | OUTPATIENT
Start: 2025-05-04 | End: 2025-05-06

## 2025-05-04 RX ORDER — NICOTINE POLACRILEX 4 MG
30 LOZENGE BUCCAL
Status: DISCONTINUED | OUTPATIENT
Start: 2025-05-04 | End: 2025-05-06

## 2025-05-04 RX ORDER — METOCLOPRAMIDE HYDROCHLORIDE 5 MG/ML
5 INJECTION INTRAMUSCULAR; INTRAVENOUS EVERY 8 HOURS PRN
Status: DISCONTINUED | OUTPATIENT
Start: 2025-05-04 | End: 2025-05-06

## 2025-05-04 NOTE — H&P
[facility]     Huyen Renner Patient Status:  Emergency    1950 MRN [id number]   Location [facility] EMERGENCY DEPARTMENT Attending Angela Preston DO   Hosp Day # 0 PCP Oneida James MD     Chief Complaint: abdominal pain    Subjective:    History of Present Illness:     Huyen Renner is a 74 year old female with PMHx HFpEF/ DM/ HTN/ HLD/ HEATH who presented to the hospital for abdominal pain. Her pain began 3 days ago and felt like period cramping in her bilateral lower quadrants. She rated her pain as a 2-10/10 with improvement with IV pain medication and no exacerbating factors. She denied any fever, chills, nausea, emesis, diarrhea, constipation.    History/Other:    Past Medical History:  Past Medical History[1]  Past Surgical History:   Past Surgical History[2]   Family History:   Family History[3]  Social History:    reports that she quit smoking about 40 years ago. Her smoking use included cigarettes. She started smoking about 59 years ago. She has a 19 pack-year smoking history. She has never used smokeless tobacco. She reports that she does not currently use alcohol. She reports that she does not use drugs.     Allergies: Allergies[4]    Medications:  Medications Ordered Prior to Encounter[5]    Review of Systems:   A comprehensive review of systems was completed.    Pertinent positives and negatives noted in the HPI.    Objective:   Physical Exam:    /56   Pulse 78   Temp 97.2 °F (36.2 °C) (Temporal)   Resp 22   Ht 5' 4\" (1.626 m)   Wt 258 lb (117 kg)   SpO2 93%   BMI 44.29 kg/m²   General: No acute distress, Alert  Respiratory: No rhonchi, no wheezes  Cardiovascular: S1, S2. Regular rate and rhythm  Abdomen: Soft, diffuse tenderness to palpation bl,  non-distended, positive bowel sounds  Neuro: No new focal deficits  Extremities: No edema      Results:    Labs:      Labs Last 24 Hours:    Recent Labs   Lab 25  2232   RBC 4.17   HGB 12.2    HCT 36.6   MCV 87.8   MCH 29.3   MCHC 33.3   RDW 13.7   NEPRELIM 11.36*   WBC 14.9*   .0       Recent Labs   Lab 05/03/25  2232   *   BUN 26*   CREATSERUM 1.62*   EGFRCR 33*   CA 9.1   ALB 4.3   *   K 3.8   CL 97*   CO2 24.0   ALKPHO 68   AST 13   ALT 11   BILT 0.9   TP 7.0       Estimated Glomerular Filtration Rate: 33 mL/min/1.73m2 (A) (result from lab).    No results found for: \"PT\", \"INR\"    No results for input(s): \"TROP\", \"TROPHS\", \"CK\" in the last 168 hours.    No results for input(s): \"TROP\", \"PBNP\" in the last 168 hours.    No results for input(s): \"PCT\" in the last 168 hours.    Imaging: Imaging data reviewed in Epic.    Assessment & Plan:      #sigmoid diverticulitis  #leukocytosis  -CT showing acute sigmoid diverticulitis, foci of possible extraluminal air (microperforation vs air within diverticulum)  -met 3 SIRS criteria: WBC 14.9, HR 94, RR 22  -cont to trend CBC  -send blood cx  -check lactate  -antibiotics: rocephin, flagyl  -hold on surgical consult for now as even if microperforation present will likely improve with antibiotics alone  -pain control: tylenol, dilaudid prn  -zofran prn    #CKD 4  -Cr 1.62 and at baseline    #DM  -SSI, QID checks, hypoglycemia protocol    #HTN  -cont home meds    #HLD  -cont home meds    Plan of care discussed with ED physician    Nazia Montoya DO    Supplementary Documentation:     The 21st Century Cures Act makes medical notes like these available to patients in the interest of transparency. Please be advised this is a medical document. Medical documents are intended to carry relevant information, facts as evident, and the clinical opinion of the practitioner. The medical note is intended as peer to peer communication and may appear blunt or direct. It is written in medical language and may contain abbreviations or verbiage that are unfamiliar.                                       [1]   Past Medical History:   Acute congestive heart failure  (HCC)    Acute diastolic heart failure (HCC)    Acute renal failure, unspecified acute renal failure type    JENNIFER (acute kidney injury)    ATN (acute tubular necrosis)    Bronchopneumonia, organism unspecified    Congestive heart disease (HCC)    Diabetes (HCC)    Essential hypertension    Hearing impairment    High blood pressure    High cholesterol    Hyperkalemia    Hyperlipidemia    Hyponatremia    Laboratory examination ordered as part of a routine general medical examination    Obesity    HEATH (obstructive sleep apnea)    on NIV    Osteopenia    Pure hypercholesterolemia    Sleep apnea    Thrombocytopenia    Visual impairment    Reading glasses   [2]   Past Surgical History:  Procedure Laterality Date    Cholecystectomy      Colonoscopy      Colonoscopy N/A 2017    Procedure: COLONOSCOPY;  Surgeon: Mayo Sewell MD;  Location: [facility] ENDOSCOPY    Colonoscopy  2023    Colonoscopy N/A 2018    Procedure: COLONOSCOPY;  Surgeon: Mayo Sewell MD;  Location: [facility] ENDOSCOPY    Colonoscopy N/A 2023    Procedure: COLONOSCOPY;  Surgeon: Mayo Sewell MD;  Location: [facility] ENDOSCOPY    Hysterectomy      partial hysteectomy          Tonsillectomy     [3]   Family History  Problem Relation Age of Onset    Diabetes Mother     Stroke Mother     Other (cancer stomach) Father     Cancer Sister         breast    Diabetes Sister     Obesity Sister     Diabetes Brother     Obesity Brother    [4] No Known Allergies  [5]   No current facility-administered medications on file prior to encounter.     Current Outpatient Medications on File Prior to Encounter   Medication Sig Dispense Refill    insulin glargine (LANTUS SOLOSTAR) 100 UNIT/ML Subcutaneous Solution Pen-injector Inject 3 Units into the skin nightly. 15 mL 0    Insulin Pen Needle (BD PEN NEEDLE SHANNAN 2ND GEN) 32G X 4 MM Does not apply Misc 1 each in the morning, at noon, in the evening, and at bedtime. 400 each 1    lisinopril 10 MG Oral  Tab TAKE 1 TABLET EVERY DAY 90 tablet 0    Insulin Lispro, 1 Unit Dial, (HUMALOG KWIKPEN) 100 UNIT/ML Subcutaneous Solution Pen-injector Inject 3 Units into the skin 3 (three) times daily before meals. Do not take if your pre-meal blood sugar level is lower than 110 15 mL 0    empagliflozin (JARDIANCE) 10 MG Oral Tab TAKE 1 TABLET EVERY DAY 90 tablet 0    Continuous Glucose Sensor (FREESTYLE NICHELLE 14 DAY SENSOR) Does not apply Misc 1 Application every 14 (fourteen) days. 6 each 0    Continuous Glucose  (FREESTYLE NICHELLE 3 READER) Does not apply Device 1 kit daily. 1 each 0    torsemide 20 MG Oral Tab TAKE 1 TABLET EVERY DAY 90 tablet 0    ACCU-CHEK SOFTCLIX LANCETS Does not apply Misc TEST BLOOD SUGAR IN THE MORNING AND BEFORE BEDTIME AS DIRECTED 200 each 3    Pravastatin Sodium 80 MG Oral Tab TAKE 1 TABLET EVERY NIGHT 90 tablet 1    Blood Glucose Monitoring Suppl (ACCU-CHEK GUIDE ME) w/Device Does not apply Kit Take 1 Bottle by mouth in the morning, at noon, and at bedtime. DX E11.8 1 kit 3    Alcohol Swabs (DROPSAFE ALCOHOL PREP) 70 % Does not apply Pads USE AS DIRECTED TWICE DAILY WHEN CHECKING BLOOD SUGAR 200 each 3    Metamucil Fiber Oral Chew Tab Chew 1 tablet by mouth daily. 1 gummy daily      ACCU-CHEK GUIDE In Vitro Strip TEST BLOOD SUGAR TWO TIMES DAILY 200 strip 3    Blood Glucose Monitoring Suppl (TRUE METRIX AIR GLUCOSE METER) w/Device Does not apply Kit 1 each 2 (two) times a day. Diagnosis code: E11.8 1 kit 0    Glucose Blood (RELION TRUE METRIX TEST STRIPS) In Vitro Strip 1 strip by Finger stick route 2 (two) times a day. Diagnosis code: E11.8 100 each 0

## 2025-05-04 NOTE — PLAN OF CARE
Pt Aox4. VSS. RA.   No complain N/V/D.   Reported abdominal pain. Tylenol PRN administered.   Bmx1.     Good appetite.   Up independent.   Will continue plan of care.     Problem: GASTROINTESTINAL - ADULT  Goal: Maintains or returns to baseline bowel function  Description: INTERVENTIONS:- Assess bowel function- Maintain adequate hydration with IV or PO as ordered and tolerated- Evaluate effectiveness of GI medications- Encourage mobilization and activity- Obtain nutritional consult as needed- Establish a toileting routine/schedule- Consider collaborating with pharmacy to review patient's medication profile  Outcome: Progressing

## 2025-05-04 NOTE — ED PROVIDER NOTES
Patient Seen in: [facility] Emergency Department      History     Chief Complaint   Patient presents with    Abdomen/Flank Pain     Stated Complaint: lower abd pain since friday, no n/v/d    Subjective:   HPI    Patient is a 74-year-old female presenting to the ED with abdominal pain that started on Friday.  The pain is located in the right lower quadrant, described as cramping, intermittent and severe when present.  At this time she has no complaints of any severe pain and does not require any pain medications as pain is intermittent. She has had no nausea or vomiting.  Patient did have a normal bowel movement in the last 24 hours.  Patient does report some urinary frequency.  No hematuria or dysuria.  No associated flank pain.  No history of kidney stones.      History of Present Illness               Objective:     Past Medical History:    Acute congestive heart failure (HCC)    Acute diastolic heart failure (HCC)    Acute renal failure, unspecified acute renal failure type    JENNIFER (acute kidney injury)    ATN (acute tubular necrosis)    Bronchopneumonia, organism unspecified    Congestive heart disease (HCC)    Diabetes (HCC)    Essential hypertension    Hearing impairment    High blood pressure    High cholesterol    Hyperkalemia    Hyperlipidemia    Hyponatremia    Laboratory examination ordered as part of a routine general medical examination    Obesity    HEATH (obstructive sleep apnea)    on NIV    Osteopenia    Pure hypercholesterolemia    Sleep apnea    Thrombocytopenia    Visual impairment    Reading glasses              Past Surgical History:   Procedure Laterality Date    Cholecystectomy      Colonoscopy  2005    Colonoscopy N/A 05/01/2017    Procedure: COLONOSCOPY;  Surgeon: Mayo Sewell MD;  Location: [facility] ENDOSCOPY    Colonoscopy  11/2023    Colonoscopy N/A 12/27/2018    Procedure: COLONOSCOPY;  Surgeon: Mayo Sewell MD;  Location: [facility] ENDOSCOPY    Colonoscopy N/A 11/22/2023     Procedure: COLONOSCOPY;  Surgeon: Mayo Sewell MD;  Location: [facility] ENDOSCOPY    Hysterectomy  2000    partial hysteectomy          Tonsillectomy                  Social History     Socioeconomic History    Marital status:    Occupational History    Occupation: Other or Not Employed     Comment:    Tobacco Use    Smoking status: Former     Current packs/day: 0.00     Average packs/day: 1 pack/day for 19.0 years (19.0 ttl pk-yrs)     Types: Cigarettes     Start date:      Quit date:      Years since quittin.3    Smokeless tobacco: Never   Vaping Use    Vaping status: Never Used   Substance and Sexual Activity    Alcohol use: Not Currently    Drug use: Never   Other Topics Concern    Caffeine Concern No    Stress Concern No    Weight Concern No    Special Diet No    Exercise No    Seat Belt No     Social Drivers of Health     Food Insecurity: No Food Insecurity (2025)    NCSS - Food Insecurity     Worried About Running Out of Food in the Last Year: No     Ran Out of Food in the Last Year: No   Transportation Needs: No Transportation Needs (2025)    NCSS - Transportation     Lack of Transportation: No   Housing Stability: Not At Risk (2025)    NCSS - Housing/Utilities     Has Housing: Yes     Worried About Losing Housing: No     Unable to Get Utilities: No                                Physical Exam     ED Triage Vitals [25 2210]   /68   Pulse 94   Resp 18   Temp 97.2 °F (36.2 °C)   Temp src Temporal   SpO2 95 %   O2 Device None (Room air)       Current Vitals:   Vital Signs  BP: 103/85  Pulse: 87  Resp: 16  Temp: 98.9 °F (37.2 °C)  Temp src: Oral  MAP (mmHg): 89    Oxygen Therapy  SpO2: 94 %  O2 Device: None (Room air)        Physical Exam  Vitals and nursing note reviewed.   Constitutional:       General: She is not in acute distress.     Appearance: Normal appearance. She is well-developed. She is not ill-appearing or toxic-appearing.    HENT:      Head: Normocephalic and atraumatic.      Right Ear: External ear normal.      Left Ear: External ear normal.      Mouth/Throat:      Mouth: Mucous membranes are moist.      Pharynx: Oropharynx is clear.   Eyes:      Conjunctiva/sclera: Conjunctivae normal.   Cardiovascular:      Rate and Rhythm: Normal rate and regular rhythm.      Heart sounds: Normal heart sounds.   Pulmonary:      Effort: Pulmonary effort is normal.      Breath sounds: Normal breath sounds.   Abdominal:      General: Abdomen is flat. Bowel sounds are normal. There is no distension.      Palpations: Abdomen is soft.      Tenderness: There is abdominal tenderness. There is no right CVA tenderness, left CVA tenderness, guarding or rebound.      Comments: Reproducible tenderness right lower quadrant   Musculoskeletal:      Right lower leg: No edema.      Left lower leg: No edema.   Skin:     General: Skin is warm.      Capillary Refill: Capillary refill takes less than 2 seconds.      Findings: No rash.   Neurological:      General: No focal deficit present.      Mental Status: She is alert and oriented to person, place, and time.      Sensory: No sensory deficit.      Motor: No weakness.   Psychiatric:         Mood and Affect: Mood normal.         Behavior: Behavior normal.           Physical Exam                ED Course     Labs Reviewed   COMP METABOLIC PANEL (14) - Abnormal; Notable for the following components:       Result Value    Glucose 199 (*)     Sodium 134 (*)     Chloride 97 (*)     BUN 26 (*)     Creatinine 1.62 (*)     eGFR-Cr 33 (*)     All other components within normal limits   CBC WITH DIFFERENTIAL WITH PLATELET - Abnormal; Notable for the following components:    WBC 14.9 (*)     Neutrophil Absolute Prelim 11.36 (*)     Neutrophil Absolute 11.36 (*)     Monocyte Absolute 1.66 (*)     All other components within normal limits   URINALYSIS WITH CULTURE REFLEX - Abnormal; Notable for the following components:    Clarity  Urine Turbid (*)     Glucose Urine >1000 (*)     Ketones Urine 10 (*)     Protein Urine Trace (*)     Leukocyte Esterase Urine 500 (*)     WBC Urine 21-50 (*)     RBC Urine 3-5 (*)     Bacteria Urine Rare (*)     Squamous Epi. Cells Few (*)     All other components within normal limits   POCT GLUCOSE - Abnormal; Notable for the following components:    POC Glucose 200 (*)     All other components within normal limits   LACTIC ACID, PLASMA - Normal   URINE CULTURE, ROUTINE   BLOOD CULTURE   BLOOD CULTURE          Results                           MDM      History obtained from patient.     Differential diagnosis includes appendicitis, diverticulitis, kidney stone, UTI, viscus perforation, bowel obstruction.    Previous records reviewed.  Office visit from April 30, 2025 for obstructive sleep apnea.  Patient is on AVAPS at night.    Testing considered and ordered includes CBC, CMP, UA, urine culture, ultimately blood cultures and lactic acid were added.    I reviewed all results.  CBC with leukocytosis and WBC of 14.9 with neutrophilic shift present.  CMP reviewed with mild hyponatremia with a sodium of 134 and chloride of 97.  BUN of 26 with a creatinine of 1.62, stable chronic kidney disease.  UA reviewed with possible UTI with presence of leukocyte esterase, WBC 21-50, rare bacteria and 3-5 RBCs.  Lactic acid is normal at 1.3.  Blood cultures and urine culture are pending.      I also reviewed the official report which shows     CT abdomen and pelvis without contrast    COMPARISON: 8/27/2024.    IMPRESSION:  Acute sigmoid diverticulitis.  A few foci of possible extraluminal air (microperforation?) versus air within diverticulum noted (3-87).  No pneumoperitoneum or abscess.  The fat planes between the sigmoid diverticulitis and urinary bladder are effaced although minimally still present.  No air within the urinary bladder.  Attention on follow-up to ensure there is no development of colovesical fistula.    No  other acute abnormality in the abdomen or pelvis. No appendicitis.      Discussed results with patient as well as plan for hospitalization.  Case was discussed with [facility] hospitalist, Dr. Montoya.  Zosyn as well as IV fluids were ordered.  Discussed [facility] would like to hold at this time until final report is placed in the morning.  Patient is otherwise comfortable, does not require any pain medication, vital signs stable.  Admission disposition: 5/4/2025  2:41 AM           Medical Decision Making      Disposition and Plan     Clinical Impression:  1. Diverticulitis of colon with perforation         Disposition:  Admit  5/4/2025  2:41 am    Follow-up:  No follow-up provider specified.        Medications Prescribed:  Current Discharge Medication List          Supplementary Documentation:         Hospital Problems       Present on Admission  Date Reviewed: 4/30/2025          ICD-10-CM Noted POA    * (Principal) Diverticulitis of colon with perforation K57.20 5/4/2025 Unknown    Sigmoid diverticulitis K57.32 5/4/2025 Unknown

## 2025-05-04 NOTE — ED QUICK NOTES
CT called that pt IV blew under pressure of CT contrast. Unable to inject second time due to pt GFR. ED MD notified.

## 2025-05-04 NOTE — ED QUICK NOTES
Orders for admission, patient is aware of plan and ready to go upstairs. Any questions, please call ED RN Maggie at extension [phone].     Patient Covid vaccination status: Fully vaccinated     COVID Test Ordered in ED: None    COVID Suspicion at Admission: N/A    Running Infusions: Medication Infusions[1]     Mental Status/LOC at time of transport: A&Ox4    Other pertinent information: Infiltrated IV R arm. Swelling present, but no pain. Needs CPAP at night.  at bedside.     CIWA score: N/A   NIH score:  N/A             [1]

## 2025-05-04 NOTE — CONSULTS
[facility]  Report of Consultation    Huyen Renner Patient Status:  Inpatient    1950 MRN [id number]   Location [facility] 4NW-A Attending Nazia Montoya, DO   Hosp Day # 0 PCP Oneida James MD     Requesting Physician:  Dr. Coy    Reason for Consultation:  Sigmoid diverticulitis    Chief Complaint:  Abdominal pain    History of Present Illness:  Huyen Renner is a 74 year old female who presents to [facility] on 5/3/2025 with complaints of abdominal pain. Patient states her pain began Friday, located primarily in the right lower and mid lower abdomen. She states the pain is worse when laying in bed, turning over, better when she is up walking around. She denies any associated nausea or vomiting. She denies fever or chills. Patient denies urinary changes, she denies dysurea or passage of air in urine. The patient states she has previously been aware of her diverticular disease, and has experienced a few episodes of flares in the past. She states this is her first acute flare this year.    Upon presentation to the hospital, CBC patient was afebrile and hemodynamically stable.  CBC significant for leukocytosis, WBC 14.9.  Hgb 12.2, ,000.  CMP demonstrates hyperglycemia of 199.  Sodium is low at 134, chloride is low at 97.  BUN and creatinine elevated at 26 and 1.62 respectively.  CT A/P performed, revealing long segment sigmoid diverticulitis with possible microperforations, contained perforation versus air in diverticula.  There is extensive inflammatory stranding and free fluid in the left side of the pelvis.  The inflammatory process extends to the urinary bladder along its dome and left lateral wall with associated bladder thickening colovesical fistula cannot entirely be excluded.    Past medical history significant for CKD stage III, hypercholesterolemia, hypertension, type 2 diabetes mellitus, paroxysmal atrial tachycardia, obesity hypoventilation syndrome.    Past abdominal  surgical history   - cholecystectomy    Family history is significant for:  Mother: Diabetes, stroke  Father: Cancer (stomach)  Sister: Cancer, diabetes  Brother: Diabetes    Patient denies oral anticoagulation use.    Patient reports last colonoscopy was 2 years ago.    History:  Past Medical History[1]  Past Surgical History[2]  Family History[3]   reports that she quit smoking about 40 years ago. Her smoking use included cigarettes. She started smoking about 59 years ago. She has a 19 pack-year smoking history. She has never used smokeless tobacco. She reports that she does not currently use alcohol. She reports that she does not use drugs.    Allergies:  Allergies[4]    Medications:  Prescriptions Prior to Admission[5]    Current Hospital Medications[6]    Review of Systems:  Review of Systems   Constitutional:  Negative for chills and fever.   HENT:  Negative for congestion and sore throat.    Eyes: Negative.    Respiratory:  Negative for cough and shortness of breath.    Cardiovascular:  Negative for chest pain.   Gastrointestinal:  Positive for abdominal pain. Negative for nausea and vomiting.   Endocrine: Negative.    Genitourinary:  Negative for dysuria and urgency.   Musculoskeletal:  Negative for back pain.   Skin:  Negative for pallor and rash.   Allergic/Immunologic: Negative.    Neurological:  Negative for dizziness.   Psychiatric/Behavioral: Negative.         Physical Exam:  /51 (BP Location: Left arm)   Pulse 88   Temp 98.8 °F (37.1 °C) (Oral)   Resp 18   Ht 64\"   Wt 251 lb (113.9 kg)   SpO2 93%   BMI 43.08 kg/m²   Physical Exam  Vitals and nursing note reviewed.   Constitutional:       Appearance: Normal appearance.   HENT:      Head: Normocephalic and atraumatic.   Cardiovascular:      Rate and Rhythm: Normal rate.   Abdominal:      Tenderness: There is abdominal tenderness in the right lower quadrant.   Skin:     General: Skin is warm and dry.      Capillary Refill: Capillary refill  takes less than 2 seconds.      Coloration: Skin is not jaundiced.   Neurological:      General: No focal deficit present.      Mental Status: She is alert.         Laboratory Data:  Recent Labs   Lab 05/03/25  2232   RBC 4.17   HGB 12.2   HCT 36.6   MCV 87.8   MCH 29.3   MCHC 33.3   RDW 13.7   NEPRELIM 11.36*   WBC 14.9*   .0       Recent Labs   Lab 05/03/25  2232   *   BUN 26*   CREATSERUM 1.62*   CA 9.1   ALB 4.3   *   K 3.8   CL 97*   CO2 24.0   ALKPHO 68   AST 13   ALT 11   BILT 0.9   TP 7.0         No results for input(s): \"PTP\", \"INR\", \"PTT\" in the last 168 hours.      CT ABDOMEN+PELVIS(CPT=74176)  Result Date: 5/4/2025  CONCLUSION:  Long segment sigmoid diverticulitis with possible micro perforations/contained perforation versus air and diverticula.  There is extensive inflammatory stranding and free fluid in the left side of the pelvis.  The inflammatory process extends to the urinary bladder along its dome and left lateral wall with associated bladder thickening.  Colovesical fistula cannot entirely be excluded.  LOCATION:  [id number]   Dictated by (CST): Leatha Meredith MD on 5/04/2025 at 8:40 AM     Finalized by (CST): Leatha Meredith MD on 5/04/2025 at 8:43 AM             Medical Decision Making         Impression:  Problem List[7]    Acute diverticulitis with possible contained microperforations    Plan:  Patient was seen and examined by Dr. Palacios who does not recommend any acute surgical intervention.  She recommends conservative management with close observation.  CLD as patient tolerates  IV antibiotics  Analgesics and antiemetics as needed  Continue to trend CBC, monitor leukocytosis      Elysia Batista PA-C  5/4/2025  2:25 PM    Is this a shared or split note between Advanced Practice Provider and Physician? Yes    The patient was independently examined.  I agree with the PAs assessment and plan.      This is a 74-year-old woman with acute diverticulitis    CT images were  reviewed personally by me  CT shows a long segment of sigmoid colon that is thickened with surrounding inflammation consistent with diverticulitis  On physical exam, patient has tenderness to palpation in the right lower quadrant, mild tenderness in the left  Patient's last colonoscopy was in 2023 where a tubular adenoma and hyperplastic polyp was found    No acute surgical intervention at this time  I discussed with the patient that conservative treatment is first-line but if she fails conservative treatment, patient may need surgical intervention  Continue IV antibiotics  Okay for clear liquids, will advance once pain is significantly improved  Encourage ambulation and mobilization  Surgery will continue to follow  Rest of care per primary    More than 50% of clinical time and 100% MDM was performed by me    Thank you for letting me participate in the care of this patient      Luanne Palacios MD  [facility]  5/4/2025  6:37 PM         [1]   Past Medical History:   Acute congestive heart failure (HCC)    Acute diastolic heart failure (HCC)    Acute renal failure, unspecified acute renal failure type    JENNIFER (acute kidney injury)    ATN (acute tubular necrosis)    Bronchopneumonia, organism unspecified    Congestive heart disease (HCC)    Diabetes (HCC)    Essential hypertension    Hearing impairment    High blood pressure    High cholesterol    Hyperkalemia    Hyperlipidemia    Hyponatremia    Laboratory examination ordered as part of a routine general medical examination    Obesity    HEATH (obstructive sleep apnea)    on NIV    Osteopenia    Pure hypercholesterolemia    Sleep apnea    Thrombocytopenia    Visual impairment    Reading glasses   [2]   Past Surgical History:  Procedure Laterality Date    Cholecystectomy      Colonoscopy  2005    Colonoscopy N/A 05/01/2017    Procedure: COLONOSCOPY;  Surgeon: Mayo Sewell MD;  Location: [facility] ENDOSCOPY    Colonoscopy  11/2023    Colonoscopy N/A 12/27/2018     Procedure: COLONOSCOPY;  Surgeon: Mayo Sewell MD;  Location: [facility] ENDOSCOPY    Colonoscopy N/A 2023    Procedure: COLONOSCOPY;  Surgeon: Mayo Sewell MD;  Location: [facility] ENDOSCOPY    Hysterectomy      partial hysteectomy          Tonsillectomy     [3]   Family History  Problem Relation Age of Onset    Diabetes Mother     Stroke Mother     Other (cancer stomach) Father     Cancer Sister         breast    Diabetes Sister     Obesity Sister     Diabetes Brother     Obesity Brother    [4] No Known Allergies  [5]   Medications Prior to Admission   Medication Sig    insulin glargine (LANTUS SOLOSTAR) 100 UNIT/ML Subcutaneous Solution Pen-injector Inject 3 Units into the skin nightly.    Insulin Pen Needle (BD PEN NEEDLE SHANNAN 2ND GEN) 32G X 4 MM Does not apply Misc 1 each in the morning, at noon, in the evening, and at bedtime.    lisinopril 10 MG Oral Tab TAKE 1 TABLET EVERY DAY    Insulin Lispro, 1 Unit Dial, (HUMALOG KWIKPEN) 100 UNIT/ML Subcutaneous Solution Pen-injector Inject 3 Units into the skin 3 (three) times daily before meals. Do not take if your pre-meal blood sugar level is lower than 110    empagliflozin (JARDIANCE) 10 MG Oral Tab TAKE 1 TABLET EVERY DAY    Continuous Glucose Sensor (FREESTYLE NICHELLE 14 DAY SENSOR) Does not apply Misc 1 Application every 14 (fourteen) days.    Continuous Glucose  (FREESTYLE NICHELLE 3 READER) Does not apply Device 1 kit daily.    torsemide 20 MG Oral Tab TAKE 1 TABLET EVERY DAY    ACCU-CHEK SOFTCLIX LANCETS Does not apply Misc TEST BLOOD SUGAR IN THE MORNING AND BEFORE BEDTIME AS DIRECTED    Pravastatin Sodium 80 MG Oral Tab TAKE 1 TABLET EVERY NIGHT    Blood Glucose Monitoring Suppl (ACCU-CHEK GUIDE ME) w/Device Does not apply Kit Take 1 Bottle by mouth in the morning, at noon, and at bedtime. DX E11.8    Alcohol Swabs (DROPSAFE ALCOHOL PREP) 70 % Does not apply Pads USE AS DIRECTED TWICE DAILY WHEN CHECKING BLOOD SUGAR    ACCU-CHEK GUIDE In Vitro  Strip TEST BLOOD SUGAR TWO TIMES DAILY    Blood Glucose Monitoring Suppl (TRUE METRIX AIR GLUCOSE METER) w/Device Does not apply Kit 1 each 2 (two) times a day. Diagnosis code: E11.8    Glucose Blood (RELION TRUE METRIX TEST STRIPS) In Vitro Strip 1 strip by Finger stick route 2 (two) times a day. Diagnosis code: E11.8    Metamucil Fiber Oral Chew Tab Chew 1 tablet by mouth daily. 1 gummy daily   [6]   Current Facility-Administered Medications:     metroNIDAZOLE (Flagyl) tab 500 mg, 500 mg, Oral, BID    glucose (Dex4) 15 GM/59ML oral liquid 15 g, 15 g, Oral, Q15 Min PRN **OR** glucose (Glutose) 40% oral gel 15 g, 15 g, Oral, Q15 Min PRN **OR** glucose-vitamin C (Dex-4) chewable tab 4 tablet, 4 tablet, Oral, Q15 Min PRN **OR** dextrose 50% injection 50 mL, 50 mL, Intravenous, Q15 Min PRN **OR** glucose (Dex4) 15 GM/59ML oral liquid 30 g, 30 g, Oral, Q15 Min PRN **OR** glucose (Glutose) 40% oral gel 30 g, 30 g, Oral, Q15 Min PRN **OR** glucose-vitamin C (Dex-4) chewable tab 8 tablet, 8 tablet, Oral, Q15 Min PRN    heparin (Porcine) 5000 UNIT/ML injection 5,000 Units, 5,000 Units, Subcutaneous, 2 times per day    acetaminophen (Tylenol) tab 650 mg, 650 mg, Oral, Q4H PRN **OR** HYDROcodone-acetaminophen (Norco) 5-325 MG per tab 1 tablet, 1 tablet, Oral, Q4H PRN **OR** HYDROcodone-acetaminophen (Norco) 5-325 MG per tab 2 tablet, 2 tablet, Oral, Q4H PRN    melatonin tab 3 mg, 3 mg, Oral, Nightly PRN    ondansetron (Zofran) 4 MG/2ML injection 4 mg, 4 mg, Intravenous, Q6H PRN    metoclopramide (Reglan) 5 mg/mL injection 5 mg, 5 mg, Intravenous, Q8H PRN    insulin aspart (NovoLOG) 100 Units/mL FlexPen 2-10 Units, 2-10 Units, Subcutaneous, TID AC and HS    benzonatate (Tessalon) cap 200 mg, 200 mg, Oral, TID PRN    glycerin-hypromellose- (Artificial Tears) 0.2-0.2-1 % ophthalmic solution 1 drop, 1 drop, Both Eyes, QID PRN    sodium chloride (Saline Mist) 0.65 % nasal solution 1 spray, 1 spray, Each Nare, Q3H PRN     cefTRIAXone (Rocephin) 2 g in sodium chloride 0.9% 100 mL IVPB-ADDV, 2 g, Intravenous, Q24H    lisinopril (Zestril) tab 10 mg, 10 mg, Oral, Daily    atorvastatin (Lipitor) tab 20 mg, 20 mg, Oral, Nightly    torsemide (Demadex) tab 20 mg, 20 mg, Oral, Daily  [7]   Patient Active Problem List  Diagnosis    Morbid obesity ([facility])    Pure hypercholesterolemia    Vitamin D deficiency    Essential hypertension    Nocturnal hypoxemia    CKD (chronic kidney disease) stage 3, GFR 30-59 ml/min ([facility])    Controlled diabetes mellitus type 2 with complications ([facility])    H/O paroxysmal atrial tachycardia    Microalbuminuria    Smokers' cough ([facility])    Obesity hypoventilation syndrome ([facility])    Diverticulitis of colon with perforation    Sigmoid diverticulitis

## 2025-05-04 NOTE — PROGRESS NOTES
NURSING ADMISSION NOTE      Patient admitted via Ambulatory  Oriented to room.  Safety precautions initiated.  Bed in low position.  Call light in reach.      Pt A&Ox4 room air, wears cpap at night with 3L o2. From home with her spouse. Complaints of pain RLQ. Last bm 5/3 formed/hard. Ambulates well on her own. Call light within reach, frequent checks made, needs met.

## 2025-05-04 NOTE — ED INITIAL ASSESSMENT (HPI)
Pt to ER with c/o lower abdominal pain since Friday. Denies N/V/D. Last BM this morning.   Denies urinary symptoms.

## 2025-05-05 LAB
ANION GAP SERPL CALC-SCNC: 14 MMOL/L (ref 0–18)
BASOPHILS # BLD AUTO: 0.02 X10(3) UL (ref 0–0.2)
BASOPHILS NFR BLD AUTO: 0.1 %
BUN BLD-MCNC: 29 MG/DL (ref 9–23)
CALCIUM BLD-MCNC: 9 MG/DL (ref 8.7–10.6)
CHLORIDE SERPL-SCNC: 99 MMOL/L (ref 98–112)
CO2 SERPL-SCNC: 23 MMOL/L (ref 21–32)
CREAT BLD-MCNC: 2.02 MG/DL (ref 0.55–1.02)
EGFRCR SERPLBLD CKD-EPI 2021: 25 ML/MIN/1.73M2 (ref 60–?)
EOSINOPHIL # BLD AUTO: 0.02 X10(3) UL (ref 0–0.7)
EOSINOPHIL NFR BLD AUTO: 0.1 %
ERYTHROCYTE [DISTWIDTH] IN BLOOD BY AUTOMATED COUNT: 13.5 %
GLUCOSE BLD-MCNC: 180 MG/DL (ref 70–99)
GLUCOSE BLD-MCNC: 194 MG/DL (ref 70–99)
GLUCOSE BLD-MCNC: 215 MG/DL (ref 70–99)
GLUCOSE BLD-MCNC: 218 MG/DL (ref 70–99)
GLUCOSE BLD-MCNC: 218 MG/DL (ref 70–99)
HCT VFR BLD AUTO: 34.3 % (ref 35–48)
HGB BLD-MCNC: 11.6 G/DL (ref 12–16)
IMM GRANULOCYTES # BLD AUTO: 0.09 X10(3) UL (ref 0–1)
IMM GRANULOCYTES NFR BLD: 0.6 %
LYMPHOCYTES # BLD AUTO: 1.35 X10(3) UL (ref 1–4)
LYMPHOCYTES NFR BLD AUTO: 8.8 %
MCH RBC QN AUTO: 29.4 PG (ref 26–34)
MCHC RBC AUTO-ENTMCNC: 33.8 G/DL (ref 31–37)
MCV RBC AUTO: 87.1 FL (ref 80–100)
MONOCYTES # BLD AUTO: 1.41 X10(3) UL (ref 0.1–1)
MONOCYTES NFR BLD AUTO: 9.2 %
NEUTROPHILS # BLD AUTO: 12.39 X10 (3) UL (ref 1.5–7.7)
NEUTROPHILS # BLD AUTO: 12.39 X10(3) UL (ref 1.5–7.7)
NEUTROPHILS NFR BLD AUTO: 81.2 %
OSMOLALITY SERPL CALC.SUM OF ELEC: 294 MOSM/KG (ref 275–295)
PLATELET # BLD AUTO: 277 10(3)UL (ref 150–450)
POTASSIUM SERPL-SCNC: 3.8 MMOL/L (ref 3.5–5.1)
RBC # BLD AUTO: 3.94 X10(6)UL (ref 3.8–5.3)
SODIUM SERPL-SCNC: 136 MMOL/L (ref 136–145)
WBC # BLD AUTO: 15.3 X10(3) UL (ref 4–11)

## 2025-05-05 PROCEDURE — 99231 SBSQ HOSP IP/OBS SF/LOW 25: CPT | Performed by: PHYSICIAN ASSISTANT

## 2025-05-05 PROCEDURE — 99232 SBSQ HOSP IP/OBS MODERATE 35: CPT | Performed by: INTERNAL MEDICINE

## 2025-05-05 RX ORDER — CEFPODOXIME PROXETIL 200 MG/1
200 TABLET, FILM COATED ORAL DAILY
Qty: 8 TABLET | Refills: 0 | Status: SHIPPED | OUTPATIENT
Start: 2025-05-05 | End: 2025-05-13

## 2025-05-05 RX ORDER — METRONIDAZOLE 500 MG/1
500 TABLET ORAL 3 TIMES DAILY
Qty: 24 TABLET | Refills: 0 | Status: SHIPPED | OUTPATIENT
Start: 2025-05-05 | End: 2025-05-13

## 2025-05-05 RX ORDER — CEFPODOXIME PROXETIL 200 MG/1
200 TABLET, FILM COATED ORAL 2 TIMES DAILY
Qty: 16 TABLET | Refills: 0 | Status: SHIPPED | OUTPATIENT
Start: 2025-05-05 | End: 2025-05-05

## 2025-05-05 NOTE — PLAN OF CARE
Patient is alert and oriented x4. Afebrile. Denies any pain and discomforts. No shortness of breath noted, on room air. Scheduled meds given per MAR, tolerated. Call light within reach. Safety precautions in place. All needs attended. Continue monitor.

## 2025-05-05 NOTE — DISCHARGE INSTRUCTIONS
Discharge Instructions for Diverticulitis    You have been diagnosed with diverticulitis. This is a condition in which small pouches form in your colon (large intestine) and become inflamed or infected. Follow the guidelines below for home care.      Recovery.    Eat a low-fiber diet. This gives your bowel a chance to rest so that it can recover.  Avoid raw vegetables. Continue this diet for at least 1 week.    Foods that are ok to eat include: mashed potatoes, pancakes, waffles, pasta, white bread, rice, applesauce, bananas, eggs, fish, poultry, tofu.     Take your antibiotics as directed. Do not stop taking the medicines, even if you feel better. Complete the prescribed course of antibiotics.      Preventing diverticulitis in the future.    Eat a high-fiber diet. Fiber adds bulk to the stool so that it passes through the large intestine more easily. After the first week, slowly add high fiber foods back to your diet.    Drink plenty of water every day.    Treat diarrhea with a bland diet. Start with liquids only, then slowly add fiber over time.    Constipation - If you experience constipation during the first week, take Miralax 17g (one capful) mixed in water. You can take this daily until you have a bowel movement.      Call the office immediately if you have any of the following:    Fever of 100.4°F (38.0°C) or highe  Chills  Severe cramps in the belly, most commonly the lower left side  Tenderness in the belly, most commonly the lower left side  Vomiting  Bleeding from your rectum

## 2025-05-05 NOTE — PROGRESS NOTES
[facility]  Progress Note    Huyen Renner Patient Status:  Inpatient    1950 MRN [id number]   Formerly [facility] 4NW-A Attending Nazia Montoya,    Hosp Day # 1 PCP Oneida James MD     Subjective:  The patient is resting comfortably in the bed today.  She denies increasing abdominal pain.  She denies nausea or vomiting.  She denies fever or chills.    Objective/Physical Exam:  General: Alert, orientated x3.  Cooperative.  No apparent distress.  Vital Signs:  Blood pressure 104/54, pulse 87, temperature 98.4 °F (36.9 °C), temperature source Axillary, resp. rate 18, height 64\", weight 251 lb (113.9 kg), SpO2 91%.  Wt Readings from Last 3 Encounters:   25 251 lb (113.9 kg)   25 253 lb (114.8 kg)   25 248 lb (112.5 kg)     Lungs: No respiratory distress.  Cardiac: Regular rate and rhythm.   Abdomen:  Soft, non distended, non tender, with no rebound or guarding.  No peritoneal signs.   Extremities:  No lower extremity edema noted.        Intake/Output:    Intake/Output Summary (Last 24 hours) at 2025 1131  Last data filed at 2025 1000  Gross per 24 hour   Intake 840 ml   Output --   Net 840 ml     I/O last 3 completed shifts:  In: 920 [P.O.:820; IV PIGGYBACK:100]  Out: -   I/O this shift:  In: 240 [P.O.:240]  Out: -     Medications: Scheduled Medications[1]    Labs:  Lab Results   Component Value Date    WBC 15.3 2025    HGB 11.6 2025    HCT 34.3 2025    .0 2025     Lab Results   Component Value Date     2025    K 3.8 2025    CL 99 2025    CO2 23.0 2025    BUN 29 2025    CREATSERUM 2.02 2025     2025    CA 9.0 2025     No results found for: \"PT\", \"INR\"      Assessment  Problem List[2]    Acute diverticulitis with perforation  Leukocytosis    Plan:  No plan for urgent or emergent surgical intervention.  Continue low fiber diet.  Continue IV antibiotics.  Ambulate and up to chair  DVT  prophylaxis with heparin  Dissipate discharge to home tomorrow patient continues to do well.    Quality:  DVT Mechanical Prophylaxis:   SCDs,    DVT Pharmacologic Prophylaxis   Medication    heparin (Porcine) 5000 UNIT/ML injection 5,000 Units                Code Status: Full Code  Aleman: No urinary catheter in place  Aleman Duration (in days):   Central line:    DO: 5/5/2025        Mónica Dixon PA-C  5/5/2025  11:31 AM                   [1]    metroNIDAZOLE  500 mg Oral BID    heparin  5,000 Units Subcutaneous 2 times per day    insulin aspart  2-10 Units Subcutaneous TID AC and HS    cefTRIAXone  2 g Intravenous Q24H    lisinopril  10 mg Oral Daily    atorvastatin  20 mg Oral Nightly    torsemide  20 mg Oral Daily   [2]   Patient Active Problem List  Diagnosis    Morbid obesity (HCC)    Pure hypercholesterolemia    Vitamin D deficiency    Essential hypertension    Nocturnal hypoxemia    CKD (chronic kidney disease) stage 3, GFR 30-59 ml/min (HCC)    Controlled diabetes mellitus type 2 with complications (HCC)    H/O paroxysmal atrial tachycardia    Microalbuminuria    Smokers' cough (HCC)    Obesity hypoventilation syndrome (HCC)    Diverticulitis of colon with perforation    Sigmoid diverticulitis

## 2025-05-05 NOTE — PLAN OF CARE
Received pt alert and orientated x4. VSS. No sob on RA. Afebrile. No c/o pain. All meds given per MAR. Tolerating diet. Up and using the bathroom. Plan to continue IV abx. Fall precautions in place, call light within reach.     Problem: GASTROINTESTINAL - ADULT  Goal: Maintains or returns to baseline bowel function  Description: INTERVENTIONS:- Assess bowel function- Maintain adequate hydration with IV or PO as ordered and tolerated- Evaluate effectiveness of GI medications- Encourage mobilization and activity- Obtain nutritional consult as needed- Establish a toileting routine/schedule- Consider collaborating with pharmacy to review patient's medication profile  Outcome: Progressing

## 2025-05-05 NOTE — PROGRESS NOTES
[facility]   part of [facility]     Hospitalist Progress Note     Huyen Renner Patient Status:  Inpatient    1950 MRN [id number]   Location [facility] 4NW-A Attending Nazia Montoya,    Hosp Day # 1 PCP Oneida James MD     Chief Complaint: abdominal pain    Subjective:     Patient sitting in recliner, feeling much better. Abdominal pain resolved, no n/v, tolerating FLD without issues     Objective:    Review of Systems:   A comprehensive review of systems was completed; pertinent positive and negatives stated in subjective.    Vital signs:  Temp:  [97.6 °F (36.4 °C)-99.3 °F (37.4 °C)] 98 °F (36.7 °C)  Pulse:  [77-95] 77  Resp:  [16-18] 16  BP: (111-139)/(35-86) 111/37  SpO2:  [92 %-98 %] 98 %    Physical Exam:    General: No acute distress  Respiratory: No wheezes, no rhonchi  Cardiovascular: S1, S2, regular rate and rhythm  Abdomen: Soft, Non-tender, non-distended, positive bowel sounds  Neuro: No new focal deficits.   Extremities: No edema    Diagnostic Data:    Labs:  Recent Labs   Lab 25  2232   WBC 14.9*   HGB 12.2   MCV 87.8   .0       Recent Labs   Lab 25  2232   *   BUN 26*   CREATSERUM 1.62*   CA 9.1   ALB 4.3   *   K 3.8   CL 97*   CO2 24.0   ALKPHO 68   AST 13   ALT 11   BILT 0.9   TP 7.0       Estimated Glomerular Filtration Rate: 33 mL/min/1.73m2 (A) (result from lab).    No results for input(s): \"TROP\", \"TROPHS\", \"CK\" in the last 168 hours.    No results for input(s): \"PTP\", \"INR\" in the last 168 hours.               Microbiology    Hospital Encounter on 25   1. Urine Culture, Routine     Status: None    Collection Time: 25 12:43 AM    Specimen: Urine, clean catch   Result Value Ref Range    Urine Culture  N/A     10-50,000 cfu/ml Multiple species present-probable contamination.         Imaging: Reviewed in Epic.    Medications: Scheduled Medications[1]    Assessment & Plan:      #sigmoid diverticulitis  #leukocytosis  -CT showing  acute sigmoid diverticulitis, foci of possible extraluminal air (microperforation vs air within diverticulum)  -tolerating diet  -empiric abx  -IVF, pain control, anti-emetics PRN  -general surgery following  -stable for discharge when cleared by surgery      #CKD 4  -renal function at baseline      #DM  -SSI, QID checks, hypoglycemia protocol     #HTN  -cont home meds     #HLD  -cont home meds      Stephani Coy,     Supplementary Documentation:     Quality:  DVT Mechanical Prophylaxis:   SCDs,    DVT Pharmacologic Prophylaxis   Medication    heparin (Porcine) 5000 UNIT/ML injection 5,000 Units                Code Status: Full Code  Aleman: No urinary catheter in place  Aleman Duration (in days):   Central line:    DO:     Discharge is dependent on: clinical state  At this point Ms. Renner is expected to be discharge to: home    The 21st Century Cures Act makes medical notes like these available to patients in the interest of transparency. Please be advised this is a medical document. Medical documents are intended to carry relevant information, facts as evident, and the clinical opinion of the practitioner. The medical note is intended as peer to peer communication and may appear blunt or direct. It is written in medical language and may contain abbreviations or verbiage that are unfamiliar.              **Certification      PHYSICIAN Certification of Need for Inpatient Hospitalization - Initial Certification    Patient will require inpatient services that will reasonably be expected to span two midnight's based on the clinical documentation in H+P.   Based on patients current state of illness, I anticipate that, after discharge, patient will require TBD.           [1]    metroNIDAZOLE  500 mg Oral BID    heparin  5,000 Units Subcutaneous 2 times per day    insulin aspart  2-10 Units Subcutaneous TID AC and HS    cefTRIAXone  2 g Intravenous Q24H    lisinopril  10 mg Oral Daily    atorvastatin  20 mg Oral  Nightly    torsemide  20 mg Oral Daily

## 2025-05-06 VITALS
TEMPERATURE: 97 F | HEIGHT: 64 IN | SYSTOLIC BLOOD PRESSURE: 104 MMHG | DIASTOLIC BLOOD PRESSURE: 59 MMHG | BODY MASS INDEX: 42.85 KG/M2 | WEIGHT: 251 LBS | OXYGEN SATURATION: 98 % | RESPIRATION RATE: 20 BRPM | HEART RATE: 65 BPM

## 2025-05-06 LAB
ERYTHROCYTE [DISTWIDTH] IN BLOOD BY AUTOMATED COUNT: 13.6 %
GLUCOSE BLD-MCNC: 193 MG/DL (ref 70–99)
GLUCOSE BLD-MCNC: 202 MG/DL (ref 70–99)
HCT VFR BLD AUTO: 35.4 % (ref 35–48)
HGB BLD-MCNC: 11.7 G/DL (ref 12–16)
MCH RBC QN AUTO: 28.8 PG (ref 26–34)
MCHC RBC AUTO-ENTMCNC: 33.1 G/DL (ref 31–37)
MCV RBC AUTO: 87.2 FL (ref 80–100)
PLATELET # BLD AUTO: 278 10(3)UL (ref 150–450)
RBC # BLD AUTO: 4.06 X10(6)UL (ref 3.8–5.3)
WBC # BLD AUTO: 11.5 X10(3) UL (ref 4–11)

## 2025-05-06 PROCEDURE — 3074F SYST BP LT 130 MM HG: CPT | Performed by: INTERNAL MEDICINE

## 2025-05-06 PROCEDURE — 3078F DIAST BP <80 MM HG: CPT | Performed by: INTERNAL MEDICINE

## 2025-05-06 PROCEDURE — 99231 SBSQ HOSP IP/OBS SF/LOW 25: CPT

## 2025-05-06 PROCEDURE — 99239 HOSP IP/OBS DSCHRG MGMT >30: CPT | Performed by: INTERNAL MEDICINE

## 2025-05-06 PROCEDURE — 1159F MED LIST DOCD IN RCRD: CPT | Performed by: INTERNAL MEDICINE

## 2025-05-06 PROCEDURE — 3008F BODY MASS INDEX DOCD: CPT | Performed by: INTERNAL MEDICINE

## 2025-05-06 NOTE — PROGRESS NOTES
[facility]  Progress Note    Huyen Renner Patient Status:  Inpatient    1950 MRN [id number]   Location [facility] 4NW-A Attending Stephani Coy, DO   Hosp Day # 2 PCP Oneida James MD     Subjective:  The patient was seen and examined at bedside.  She denies abdominal pain today.  She is tolerating soft diet.  She states her appetite is diminished.  She denies nausea or vomiting.  She is passing flatus and has had several small bowel movements.    Objective/Physical Exam:  /43 (BP Location: Left arm)   Pulse 72   Temp 97.6 °F (36.4 °C) (Oral)   Resp 20   Ht 64\"   Wt 251 lb (113.9 kg)   SpO2 91%   BMI 43.08 kg/m²     Intake/Output Summary (Last 24 hours) at 2025 0946  Last data filed at 2025 0900  Gross per 24 hour   Intake 1000 ml   Output --   Net 1000 ml         General: Alert, oriented x3. No acute distress.  HEENT: Normocephalic, atraumatic. No scleral icterus.  Pulmonary: No respiratory distress, effort normal.   Abdomen: Non-distended, without tympany to percussion. Soft, non-tender to palpation. No rebound or guarding. No peritoneal signs.   Extremities: No lower extremity edema. No clubbing or cyanosis.   Skin: Warm, dry. No jaundice.       Labs:  Lab Results   Component Value Date    WBC 11.5 2025    HGB 11.7 2025    HCT 35.4 2025    .0 2025      No results found for: \"PT\", \"INR\"          Assessment:  Problem List[1]    Acute diverticulitis with perforation  Leukocytosis-11.5    Plan:  No plan for acute general surgical intervention at this time.  The patient may discharge home from a general surgical standpoint today  Continue low fiber diet  Continue IV antibiotics-will transition to oral antibiotics at discharge  Encourage ambulation up to chair  Medical management per primary  DVT prophylaxis with heparin  Follow-up with Dr. Palacios in 2 to 3 weeks to discuss further management of diverticulitis      The patient was discussed  with Dr. Palacios , and she is in agreement with the assessment and plan. My total face time with this patient was 25 minutes. Greater than half of the visit was spent in counseling the patient on the above listed diagnoses and treatment options.     Peace Luis PA-C  5/6/2025  9:46 AM         [1]   Patient Active Problem List  Diagnosis    Morbid obesity (HCC)    Pure hypercholesterolemia    Vitamin D deficiency    Essential hypertension    Nocturnal hypoxemia    CKD (chronic kidney disease) stage 3, GFR 30-59 ml/min (HCC)    Controlled diabetes mellitus type 2 with complications (HCC)    H/O paroxysmal atrial tachycardia    Microalbuminuria    Smokers' cough (HCC)    Obesity hypoventilation syndrome (HCC)    Diverticulitis of colon with perforation    Sigmoid diverticulitis

## 2025-05-06 NOTE — DISCHARGE SUMMARY
[place] [facility]  DISCHARGE SUMMARY     Huyen Renner Patient Status:  Inpatient    1950 MRN [id number]   Location [facility] 4NW-A Attending Stephani Coy, DO   Hosp Day # 2 PCP Oneida James MD     Date of Admission: 5/3/2025  Date of Discharge:   25  Discharge Disposition: Home or Self Care    Discharge Diagnosis:  #sigmoid diverticulitis  #leukocytosis  #CKD 4  #DM2  #HTN  #HLD  #Morbid obesity; Body mass index is 43.08 kg/m².      History of Present Illness: (per Dr. Montoya), Huyen Renner is a 74 year old female with PMHx HFpEF/ DM/ HTN/ HLD/ HEATH who presented to the hospital for abdominal pain. Her pain began 3 days ago and felt like period cramping in her bilateral lower quadrants. She rated her pain as a 2-10/10 with improvement with IV pain medication and no exacerbating factors. She denied any fever, chills, nausea, emesis, diarrhea, constipation.     Brief Synopsis: admitted with sigmoid diverticulitis. Treated supportively with IVF, pain control, bowel rest, and empiric IV abx. General surgery was consulted and recommended non-invasive management. Her clinical condition improved, her diet was advanced, and she was discharged to home on course of PO abx and recommendation to f/u closely with PCP and general surgery in outpt setting.     Lace+ Score: 67  59-90 High Risk  29-58 Medium Risk  0-28   Low Risk       TCM Follow-Up Recommendation:  LACE > 58: High Risk of readmission after discharge from the hospital.      Procedures during hospitalization:   none    Incidental or significant findings and recommendations (brief descriptions):  As above    Lab/Test results pending at Discharge:   none    Consultants:  General surgery     Discharge Medication List:     Discharge Medications        START taking these medications        Instructions Prescription details   cefpodoxime 200 MG Tabs  Commonly known as: Vantin      Take 1 tablet (200 mg total) by mouth daily for 8 days.   Stop  taking on: May 13, 2025  Quantity: 8 tablet  Refills: 0     metroNIDAZOLE 500 MG Tabs  Commonly known as: Flagyl      Take 1 tablet (500 mg total) by mouth 3 (three) times daily for 8 days.   Stop taking on: May 13, 2025  Quantity: 24 tablet  Refills: 0            CONTINUE taking these medications        Instructions Prescription details   Accu-Chek Softclix Lancets Misc      TEST BLOOD SUGAR IN THE MORNING AND BEFORE BEDTIME AS DIRECTED   Quantity: 200 each  Refills: 3     BD Pen Needle Jackie 2nd Gen 32G X 4 MM Misc  Generic drug: Insulin Pen Needle      1 each in the morning, at noon, in the evening, and at bedtime.   Quantity: 400 each  Refills: 1     DropSafe Alcohol Prep 70 % Pads      USE AS DIRECTED TWICE DAILY WHEN CHECKING BLOOD SUGAR   Quantity: 200 each  Refills: 3     FreeStyle Debra 14 Day Sensor Misc      1 Application every 14 (fourteen) days.   Quantity: 6 each  Refills: 0     FreeStyle Debra 3 Laurel Gin      1 kit daily.   Quantity: 1 each  Refills: 0     Insulin Lispro (1 Unit Dial) 100 UNIT/ML Sopn  Commonly known as: HumaLOG KwikPen      Inject 3 Units into the skin 3 (three) times daily before meals. Do not take if your pre-meal blood sugar level is lower than 110   Quantity: 15 mL  Refills: 0     Jardiance 10 MG Tabs  Generic drug: empagliflozin      TAKE 1 TABLET EVERY DAY   Quantity: 90 tablet  Refills: 0     Lantus SoloStar 100 UNIT/ML Sopn  Generic drug: insulin glargine      Inject 3 Units into the skin nightly.   Quantity: 15 mL  Refills: 0     lisinopril 10 MG Tabs  Commonly known as: Zestril      TAKE 1 TABLET EVERY DAY   Quantity: 90 tablet  Refills: 0     Metamucil Fiber Chew      Chew 1 tablet by mouth daily. 1 gummy daily   Refills: 0     Pravastatin Sodium 80 MG Tabs  Commonly known as: PRAVACHOL      TAKE 1 TABLET EVERY NIGHT   Quantity: 90 tablet  Refills: 1     ReliOn True Metrix Test Strips Strp  Generic drug: Glucose Blood      1 strip by Finger stick route 2 (two) times a  day. Diagnosis code: E11.8   Quantity: 100 each  Refills: 0     Accu-Chek Guide Strp      TEST BLOOD SUGAR TWO TIMES DAILY   Quantity: 200 strip  Refills: 3     torsemide 20 MG Tabs  Commonly known as: Demadex      TAKE 1 TABLET EVERY DAY   Quantity: 90 tablet  Refills: 0     True Metrix Air Glucose Meter w/Device Kit      1 each 2 (two) times a day. Diagnosis code: E11.8   Quantity: 1 kit  Refills: 0     Accu-Chek Guide Me w/Device Kit      Take 1 Bottle by mouth in the morning, at noon, and at bedtime. DX E11.8   Quantity: 1 kit  Refills: 3               Where to Get Your Medications        These medications were sent to [id number] - [place], [place] - [address] [phone], [phone]  [address] [place] [address]      Phone: [phone]   cefpodoxime 200 MG Tabs  metroNIDAZOLE 500 MG Tabs         [place]    Follow-up appointment:   Oneida James MD  [address] [address] [id number] [phone]    Schedule an appointment as soon as possible for a visit in 1 week(s)      Luanne Palacios MD   [facility] [address] [phone]    Schedule an appointment as soon as possible for a visit in 3 week(s)      Appointments for Next 30 Days 2025 - 2025      None            Vital signs:  Temp:  [97 °F (36.1 °C)-99.5 °F (37.5 °C)] 97 °F (36.1 °C)  Pulse:  [65-81] 65  Resp:  [16-20] 20  BP: (104-125)/(40-59) 104/59  SpO2:  [91 %-98 %] 98 %    Physical Exam:    General: No acute distress   Lungs: clear to auscultation  Cardiovascular: S1, S2  Abdomen: Soft      -----------------------------------------------------------------------------------------------  PATIENT DISCHARGE INSTRUCTIONS: See electronic chart    Stephani Coy,     Total time spent on discharge plannin minutes     The  Cures Act makes medical notes like these available to patients in the interest of transparency. Please be advised this is  a medical document. Medical documents are intended to carry relevant information, facts as evident, and the clinical opinion of the practitioner. The medical note is intended as peer to peer communication and may appear blunt or direct. It is written in medical language and may contain abbreviations or verbiage that are unfamiliar.

## 2025-05-07 ENCOUNTER — TELEPHONE (OUTPATIENT)
Dept: FAMILY MEDICINE CLINIC | Facility: CLINIC | Age: 75
End: 2025-05-07

## 2025-05-07 ENCOUNTER — PATIENT OUTREACH (OUTPATIENT)
Dept: CASE MANAGEMENT | Age: 75
End: 2025-05-07

## 2025-05-07 NOTE — TELEPHONE ENCOUNTER
Spoke to patient for Transitions of Care call today.  Patient does not have an appointment scheduled at this time.  TCM/MARIE appointment needed by 05/13/25.  Please advise.    BOOK BY DATE: 5/13/25    Clinical staff:  Please follow-up with patient and try to get them to schedule as patient would greatly benefit from TCM/MARIE.  Thank you!     Patient declined appointment. MERCEDES

## 2025-05-07 NOTE — PROGRESS NOTES
Transitions of Care Navigation  Discharge Date: 25  Contact Date: 2025    Transitions of Care Assessment:  MARIE Initial Assessment    General:  Assessment completed with: Patient  Patient Subjective: NCM spoke with patient states is feeling a lot better. Patient denies any abdominal pain, no fevers, no chills, no nausea, no vomiting, no shortness, no chest pain, or any other symptoms at this time. Patient states is eating and drinking fine, no issues or problems. Patient states her FBS are 140-160 on average. Patient denies any questions or concerns. Patient is feeling well overall. She denies any constipation or diarrhea.  Chief Complaint: Diverticulitis of colon with perforation  Verify patient name and  with patient/ caregiver: Yes    Hospital Stay/Discharge:  Tell me what you understand of why you were in the hospital or emergency department: Alix reports to ED with abdominal pain.  Prior to leaving the hospital were your Discharge Instructions reviewed with you?: Yes  Did you receive a copy of your written Discharge Instructions?: Yes  What questions do you have about your Discharge Instructions?: No questions at this time.  Do you feel better or worse since you left the hospital or emergency department?: Better    Follow - Up Appointment:  Do you have a follow-up appointment?: No  Are there any barriers to getting to your follow-up appointment?: No    Home Health/DME:  Prior to leaving the hospital was Home Health (HH) arranged for you?: N/A  Are HH needs identified by staff during the assessment?: No     Prior to leaving the hospital or emergency department was Durable Medical Equipment (DME), medical supplies, or infusions arranged for you?: N/A  Are DME/medical supply/infusions needs identified by staff during this assessment?: No     Medications/Diet:  Did any of your medications change, during or after your hospital stay or ED visit?: Yes  Do you have your new or updated medications?: Yes  Do  you understand what your medications are for and possible side effects?: Yes  Are there any reasons that keep you from taking your medication as prescribed?: No  Any concerns about medication refills?: No    Were you given a different diet per your Discharge Instructions?: No  Reason: n/a     Questions/Concerns:  Do you have any questions or concerns that have not been discussed?: No   MARIE Follow-up Assessment    General:  Assessment completed with: Patient  Patient Subjective: NCM spoke with patient states is feeling a lot better. Patient denies any abdominal pain, no fevers, no chills, no nausea, no vomiting, no shortness, no chest pain, or any other symptoms at this time. Patient states is eating and drinking fine, no issues or problems. Patient states her FBS are 140-160 on average. Patient denies any questions or concerns. Patient is feeling well overall. She denies any constipation or diarrhea.  Chief Complaint: Diverticulitis of colon with perforation      Nursing Interventions:  All discharge instructions reviewed with the patient. Reviewed when to call MD vs when to call 911 or go the ED. Educated patient on the importance of taking all meds as prescribed as well as close f/u with PCP/specialists. Pt verbalized understanding and will contact the office with any further questions or concerns. Patient denies fevers, chills, nausea, vomiting, shortness of breath, chest pain, or any other symptoms at this time.  [facility] attempted to schedule HFU, patient declined will call PCP/TCC office directly. NC sent TE to PCP office re: assistance in scheduling HFU appt. [facility] provided contact information for any further questions/concerns. Patient verbalized understanding and agreeable.       Medications:  Medication Reconciliation:  I am aware of an inpatient discharge within the last 30 days.  The discharge medication list has been reconciled with the patient's current medication list and reviewed by me. See medication list  for additions of new medication, and changes to current doses of medications and discontinued medications.  Current Outpatient Medications   Medication Sig Dispense Refill    metroNIDAZOLE 500 MG Oral Tab Take 1 tablet (500 mg total) by mouth 3 (three) times daily for 8 days. 24 tablet 0    cefpodoxime 200 MG Oral Tab Take 1 tablet (200 mg total) by mouth daily for 8 days. 8 tablet 0    insulin glargine (LANTUS SOLOSTAR) 100 UNIT/ML Subcutaneous Solution Pen-injector Inject 3 Units into the skin nightly. 15 mL 0    Insulin Pen Needle (BD PEN NEEDLE SHANNAN 2ND GEN) 32G X 4 MM Does not apply Misc 1 each in the morning, at noon, in the evening, and at bedtime. 400 each 1    lisinopril 10 MG Oral Tab TAKE 1 TABLET EVERY DAY 90 tablet 0    Insulin Lispro, 1 Unit Dial, (HUMALOG KWIKPEN) 100 UNIT/ML Subcutaneous Solution Pen-injector Inject 3 Units into the skin 3 (three) times daily before meals. Do not take if your pre-meal blood sugar level is lower than 110 15 mL 0    empagliflozin (JARDIANCE) 10 MG Oral Tab TAKE 1 TABLET EVERY DAY 90 tablet 0    Continuous Glucose Sensor (FREESTYLE NICHELLE 14 DAY SENSOR) Does not apply Misc 1 Application every 14 (fourteen) days. 6 each 0    Continuous Glucose  (FREESTYLE NICHELLE 3 READER) Does not apply Device 1 kit daily. 1 each 0    torsemide 20 MG Oral Tab TAKE 1 TABLET EVERY DAY 90 tablet 0    ACCU-CHEK SOFTCLIX LANCETS Does not apply Misc TEST BLOOD SUGAR IN THE MORNING AND BEFORE BEDTIME AS DIRECTED 200 each 3    Pravastatin Sodium 80 MG Oral Tab TAKE 1 TABLET EVERY NIGHT 90 tablet 1    Blood Glucose Monitoring Suppl (ACCU-CHEK GUIDE ME) w/Device Does not apply Kit Take 1 Bottle by mouth in the morning, at noon, and at bedtime. DX E11.8 1 kit 3    Alcohol Swabs (DROPSAFE ALCOHOL PREP) 70 % Does not apply Pads USE AS DIRECTED TWICE DAILY WHEN CHECKING BLOOD SUGAR 200 each 3    ACCU-CHEK GUIDE In Vitro Strip TEST BLOOD SUGAR TWO TIMES DAILY 200 strip 3    Blood Glucose  Monitoring Suppl (TRUE METRIX AIR GLUCOSE METER) w/Device Does not apply Kit 1 each 2 (two) times a day. Diagnosis code: E11.8 1 kit 0    Glucose Blood (RELION TRUE METRIX TEST STRIPS) In Vitro Strip 1 strip by Finger stick route 2 (two) times a day. Diagnosis code: E11.8 100 each 0    Metamucil Fiber Oral Chew Tab Chew 1 tablet by mouth daily. 1 gummy daily           Follow-up Appointments:  Your appointments       Date & Time Appointment Department ([place])    Jun 07, 2025 9:30 AM CDT Exam - Established with Oneida James MD [address] ([address])        Oct 27, 2025 9:00 AM CDT Sleep Follow Up with Murray Contrears MD [facility] ([facility])    Sleep Patients:  Please bring your PAP device (no mask/hose) to each appointment unless instructed otherwise.              [address]  [address] [address] [place] [address] [address] [facility] [address]  [facility] [address] [phone] [address], [facility] [address]  [facility] [id number] [phone]            Transitional [facility]  Was TCC Ordered: No      Primary Care Provider (If no TCC appointment)  Does patient already have a PCP appointment scheduled? No  Nurse Care Manager Attempted to schedule PCP office TCM/MARIE appointment with patient   -If no appointment scheduled: Explain : Pt declined appointment at this time.     Specialist  Does the patient have any other follow-up appointment(s) need to be scheduled? Yes   -If yes: Nurse Care Manager reviewed upcoming specialist appointments with patient: Yes   -Does the patient need assistance scheduling appointment(s): No      Book By Date: 05/13/25

## 2025-05-08 ENCOUNTER — PATIENT MESSAGE (OUTPATIENT)
Dept: FAMILY MEDICINE CLINIC | Facility: CLINIC | Age: 75
End: 2025-05-08

## 2025-05-08 DIAGNOSIS — K57.32 SIGMOID DIVERTICULITIS: ICD-10-CM

## 2025-05-08 DIAGNOSIS — K57.20 DIVERTICULITIS OF COLON WITH PERFORATION: Primary | ICD-10-CM

## 2025-05-09 NOTE — PAYOR COMM NOTE
PLEASE GIVE RECONSIDERATION/APPROVAL TO THIS INPT ADMISSION      ADMISSION REVIEW     Payor: JOSEFA FERRARA [facility]  Subscriber #:  [id number]  Authorization Number: [id number]    Admit date: 5/4/25  Admit time:  5:31 AM       REVIEW DOCUMENTATION:  ED Provider Notes signed by Angela Preston DO at 5/4/2025  6:45 AM       Author: Angela Preston DO Service: -- Author Type: Physician    Filed: 5/4/2025  6:45 AM Date of Service: 5/4/2025 12:36 AM Status: Signed     Patient Seen in: [facility] Emergency Department    History     Chief Complaint   Patient presents with    Abdomen/Flank Pain     Stated Complaint: lower abd pain since friday, no n/v/d    HPI  Patient is a 74-year-old female presenting to the ED with abdominal pain that started on Friday.  The pain is located in the right lower quadrant, described as cramping, intermittent and severe when present.  At this time she has no complaints of any severe pain and does not require any pain medications as pain is intermittent. She has had no nausea or vomiting.  Patient did have a normal bowel movement in the last 24 hours.  Patient does report some urinary frequency.  No hematuria or dysuria.  No associated flank pain.  No history of kidney stones.    Physical Exam     ED Triage Vitals [05/03/25 2210]   /68   Pulse 94   Resp 18   Temp 97.2 °F (36.2 °C)   Temp src Temporal   SpO2 95 %   O2 Device None (Room air)     Vital Signs  BP: 103/85  Pulse: 87  Resp: 16  Temp: 98.9 °F (37.2 °C)  Temp src: Oral  MAP (mmHg): 89    Oxygen Therapy  SpO2: 94 %  O2 Device: None (Room air)    Physical Exam  Vitals and nursing note reviewed.   Constitutional:       General: She is not in acute distress.     Appearance: Normal appearance. She is well-developed. She is not ill-appearing or toxic-appearing.   HENT:      Head: Normocephalic and atraumatic.      Right Ear: External ear normal.      Left Ear: External ear normal.      Mouth/Throat:      Mouth: Mucous membranes  are moist.      Pharynx: Oropharynx is clear.   Eyes:      Conjunctiva/sclera: Conjunctivae normal.   Cardiovascular:      Rate and Rhythm: Normal rate and regular rhythm.      Heart sounds: Normal heart sounds.   Pulmonary:      Effort: Pulmonary effort is normal.      Breath sounds: Normal breath sounds.   Abdominal:      General: Abdomen is flat. Bowel sounds are normal. There is no distension.      Palpations: Abdomen is soft.      Tenderness: There is abdominal tenderness. There is no right CVA tenderness, left CVA tenderness, guarding or rebound.      Comments: Reproducible tenderness right lower quadrant   Musculoskeletal:      Right lower leg: No edema.      Left lower leg: No edema.   Skin:     General: Skin is warm.      Capillary Refill: Capillary refill takes less than 2 seconds.      Findings: No rash.   Neurological:      General: No focal deficit present.      Mental Status: She is alert and oriented to person, place, and time.      Sensory: No sensory deficit.      Motor: No weakness.   Psychiatric:         Mood and Affect: Mood normal.         Behavior: Behavior normal.     ED Course     Labs Reviewed   COMP METABOLIC PANEL (14) - Abnormal; Notable for the following components:       Result Value    Glucose 199 (*)     Sodium 134 (*)     Chloride 97 (*)     BUN 26 (*)     Creatinine 1.62 (*)     eGFR-Cr 33 (*)     All other components within normal limits   CBC WITH DIFFERENTIAL WITH PLATELET - Abnormal; Notable for the following components:    WBC 14.9 (*)     Neutrophil Absolute Prelim 11.36 (*)     Neutrophil Absolute 11.36 (*)     Monocyte Absolute 1.66 (*)     All other components within normal limits   URINALYSIS WITH CULTURE REFLEX - Abnormal; Notable for the following components:    Clarity Urine Turbid (*)     Glucose Urine >1000 (*)     Ketones Urine 10 (*)     Protein Urine Trace (*)     Leukocyte Esterase Urine 500 (*)     WBC Urine 21-50 (*)     RBC Urine 3-5 (*)     Bacteria Urine Rare  (*)     Squamous Epi. Cells Few (*)     All other components within normal limits   POCT GLUCOSE - Abnormal; Notable for the following components:    POC Glucose 200 (*)     All other components within normal limits   LACTIC ACID, PLASMA - Normal   URINE CULTURE, ROUTINE   BLOOD CULTURE   BLOOD CULTURE     MDM      History obtained from patient.     Differential diagnosis includes appendicitis, diverticulitis, kidney stone, UTI, viscus perforation, bowel obstruction.    Previous records reviewed.  Office visit from April 30, 2025 for obstructive sleep apnea.  Patient is on AVAPS at night.    Testing considered and ordered includes CBC, CMP, UA, urine culture, ultimately blood cultures and lactic acid were added.    I reviewed all results.  CBC with leukocytosis and WBC of 14.9 with neutrophilic shift present.  CMP reviewed with mild hyponatremia with a sodium of 134 and chloride of 97.  BUN of 26 with a creatinine of 1.62, stable chronic kidney disease.  UA reviewed with possible UTI with presence of leukocyte esterase, WBC 21-50, rare bacteria and 3-5 RBCs.  Lactic acid is normal at 1.3.  Blood cultures and urine culture are pending.    I also reviewed the official report which shows     CT abdomen and pelvis without contrast  IMPRESSION:  Acute sigmoid diverticulitis.  A few foci of possible extraluminal air (microperforation?) versus air within diverticulum noted (3-87).  No pneumoperitoneum or abscess.  The fat planes between the sigmoid diverticulitis and urinary bladder are effaced although minimally still present.  No air within the urinary bladder.  Attention on follow-up to ensure there is no development of colovesical fistula.    No other acute abnormality in the abdomen or pelvis. No appendicitis.    Discussed results with patient as well as plan for hospitalization.  Case was discussed with [facility] hospitalist, Dr. Montoya.  Zosyn as well as IV fluids were ordered.  Discussed surgical consultation with  [facility] hospitalist would like to hold at this time until final report is placed in the morning.  Patient is otherwise comfortable, does not require any pain medication, vital signs stable.  Admission disposition: 5/4/2025  2:41 AM    Medical Decision Making  Disposition and Plan     Clinical Impression:  1. Diverticulitis of colon with perforation       Disposition:  Admit  5/4/2025  2:41 am    Angela Preston, DO on 5/4/2025  6:45 AM      History and Physical   Chief Complaint: abdominal pain    History of Present Illness:    74 year old female with PMHx HFpEF/ DM/ HTN/ HLD/ HEATH who presented to the hospital for abdominal pain. Her pain began 3 days ago and felt like period cramping in her bilateral lower quadrants. She rated her pain as a 2-10/10 with improvement with IV pain medication and no exacerbating factors. She denied any fever, chills, nausea, emesis, diarrhea, constipation.    Lab 05/03/25  2232   RBC 4.17   HGB 12.2   HCT 36.6   MCV 87.8   MCH 29.3   MCHC 33.3   RDW 13.7   NEPRELIM 11.36*   WBC 14.9*   .0      *   BUN 26*   CREATSERUM 1.62*   EGFRCR 33*   CA 9.1   ALB 4.3   *   K 3.8   CL 97*   CO2 24.0   ALKPHO 68   AST 13   ALT 11   BILT 0.9   TP 7.0      Assessment & Plan:  #sigmoid diverticulitis  #leukocytosis  -CT showing acute sigmoid diverticulitis, foci of possible extraluminal air (microperforation vs air within diverticulum)  -met 3 SIRS criteria: WBC 14.9, HR 94, RR 22  -cont to trend CBC  -send blood cx  -check lactate  -antibiotics: rocephin, flagyl  -hold on surgical consult for now as even if microperforation present will likely improve with antibiotics alone  -pain control: tylenol, dilaudid prn  -zofran prn     #CKD 4  -Cr 1.62 and at baseline     #DM  -SSI, QID checks, hypoglycemia protocol     #HTN  -cont home meds     #HLD  -cont home meds    General Surgery   Reason for Consultation:  Sigmoid diverticulitis     Chief Complaint:  Abdominal pain     History of  Present Illness:  74 year old female who presents to [facility] on 5/3/2025 with complaints of abdominal pain. Patient states her pain began Friday, located primarily in the right lower and mid lower abdomen. She states the pain is worse when laying in bed, turning over, better when she is up walking around. She denies any associated nausea or vomiting. She denies fever or chills. Patient denies urinary changes, she denies dysurea or passage of air in urine. The patient states she has previously been aware of her diverticular disease, and has experienced a few episodes of flares in the past. She states this is her first acute flare this year.     Upon presentation to the hospital, CBC patient was afebrile and hemodynamically stable.  CBC significant for leukocytosis, WBC 14.9.  Hgb 12.2, ,000.  CMP demonstrates hyperglycemia of 199.  Sodium is low at 134, chloride is low at 97.  BUN and creatinine elevated at 26 and 1.62 respectively.  CT A/P performed, revealing long segment sigmoid diverticulitis with possible microperforations, contained perforation versus air in diverticula.  There is extensive inflammatory stranding and free fluid in the left side of the pelvis.  The inflammatory process extends to the urinary bladder along its dome and left lateral wall with associated bladder thickening colovesical fistula cannot entirely be excluded.    Impression:   Acute diverticulitis with possible contained microperforations     Plan:  No acute surgical intervention at this time  I discussed with the patient that conservative treatment is first-line but if she fails conservative treatment, patient may need surgical intervention  Continue IV antibiotics  Okay for clear liquids, will advance once pain is significantly improved  Encourage ambulation and mobilization  Surgery will continue to follow     5/5/2025  Hospitalist Progress Note   Abdominal pain resolved, no n/v, tolerating FLD without issues      Assessment & Plan:  #sigmoid diverticulitis  #leukocytosis  -CT showing acute sigmoid diverticulitis, foci of possible extraluminal air (microperforation vs air within diverticulum)  -tolerating diet  -empiric abx  -IVF, pain control, anti-emetics PRN  -general surgery following  -stable for discharge when cleared by surgery      #CKD 4  -renal function at baseline      #DM  -SSI, QID checks, hypoglycemia protocol     #HTN  -cont home meds     #HLD  -cont home meds    DVT Mechanical Prophylaxis:   SCDs,        DVT Pharmacologic Prophylaxis   Medication    heparin (Porcine) 5000 UNIT/ML injection 5,000 Units     Discharge is dependent on: clinical state    **Certification  PHYSICIAN Certification of Need for Inpatient Hospitalization - Initial Certification  Patient will require inpatient services that will reasonably be expected to span two midnight's based on the clinical documentation in H+P.   Based on patients current state of illness, I anticipate that, after discharge, patient will require TBD.    5/6/2025  General Surgery     denies abdominal pain today. She is tolerating soft diet. She states her appetite is diminished. She denies nausea or vomiting. She is passing flatus and has had several small bowel movements.     Component Value Date     WBC 11.5 05/06/2025     HGB 11.7 05/06/2025     HCT 35.4 05/06/2025     .0 05/06/2025     Assessment:  Acute diverticulitis with perforation  Leukocytosis-11.5     Plan:  No plan for acute general surgical intervention at this time.  The patient may discharge home from a general surgical standpoint today  Continue low fiber diet  Continue IV antibiotics-will transition to oral antibiotics at discharge  Encourage ambulation up to chair  Medical management per primary  DVT prophylaxis with heparin  Follow-up with Dr. Palacios in 2 to 3 weeks to discuss further management of diverticulitis    MEDICATIONS ADMINISTERED:  Medications 05/03/25 05/04/25 05/05/25  05/06/25   atorvastatin (Lipitor) tab 20 mg  Dose: 20 mg  Freq: Nightly Route: OR  Start: 05/04/25 2100 End: 05/06/25 1650 2015 LR-Given       2039 SO-Given       1650-D/C'd       cefTRIAXone (Rocephin) 2 g in sodium chloride 0.9% 100 mL IVPB-ADDV  Dose: 2 g  Freq: Every 24 hours Route: IV  Last Dose: 2 g (05/06/25 0629)  Start: 05/04/25 0600 End: 05/06/25 1650   Admin Instructions:   Ceftriaxone must NOT be administered simultaneously with calcium containing IV solutions. Includes Y-site as well.  In patients other than neonates ceftriaxone and calcium containing products may administered sequentially, provided the line is flushed in between administrations.   Order specific questions:        0620 CD-New Bag       0600 LR-New Bag       0629 SO-New Bag   1650-D/C'd      heparin (Porcine) 5000 UNIT/ML injection 5,000 Units  Dose: 5,000 Units  Freq: 2 times per day Route: SC  Start: 05/04/25 0900 End: 05/06/25 1650     0952 MK-Given   2015 LR-Given      0808 AF-Given   2039 SO-Given      0813 AF-Given   1650-D/C'd      insulin aspart (NovoLOG) 100 Units/mL FlexPen 2-10 Units  Dose: 2-10 Units  Freq: 3 times daily before meals and nightly Route: SC  Start: 05/04/25 0700 End: 05/06/25 1650   Admin Instructions:   CORRECTION FACTOR - COLUMN MEDIUM DOSE.  Continue to give correction insulin (Novolog/aspart) even if NPO; DO NOT HOLD OR ALTER INSULIN DOSE WITHOUT A PHYSICIAN ORDER.  1 unit of Novolog/aspart insulin is expected to decrease blood glucose by 20 mg/dL.    Give 2 unit if blood glucose 141-180 mg/dL  Give 3 units if blood glucose 181-220 mg/dL  Give 6 units if blood glucose 221-260 mg/dL  Give 8 units if blood glucose 261-300 mg/dL  Give 10 units if blood glucose 301-350 mg/dL  Call Physician if blood glucose is greater than 351 mg/dl with time and last dose of correction insulin given.     0620 CD-Given   1216 MK-Given     1631 MK-Given   2132 LR-Given      0600 LR-Given   1204 AF-Given     1640 AF-Given    2223 SO-Given      0629 SO-Given   1151 AF-Given        1650-D/C'd      lisinopril (Zestril) tab 10 mg  Dose: 10 mg  Freq: Daily Route: OR  Start: 05/04/25 0930 End: 05/06/25 1650 0952 MK-Given       0808 AF-Given       0813 AF-Given   1650-D/C'd      metroNIDAZOLE (Flagyl) tab 500 mg  Dose: 500 mg  Freq: 2 times daily Route: OR  Start: 05/04/25 0900 End: 05/06/25 1650   Order specific questions:        0952 MK-Given   2015 LR-Given      0808 AF-Given   2039 SO-Given      0813 AF-Given   1650-D/C'd      piperacillin-tazobactam (Zosyn) 4.5 g in dextrose 5% 100 mL IVPB-ADDV  Dose: 4.5 g  Freq: Once Route: IV  Last Dose: Stopped (05/04/25 0417)  Start: 05/04/25 0241 End: 05/04/25 0417   Admin Instructions:   Incompatible with Lactated Ringers (LR)   Order specific questions:        0347 KJ-New Bag   0417 KJ-Stopped          sodium chloride 0.9 % IV bolus 500 mL  Dose: 500 mL  Freq: Once Route: IV  Last Dose: Stopped (05/04/25 0130)  Start: 05/04/25 0036 End: 05/04/25 0130     0106 PS-New Bag   0130 KJ-Stopped [C]          torsemide (Demadex) tab 20 mg  Dose: 20 mg  Freq: Daily Route: OR  Start: 05/04/25 0930 End: 05/06/25 1650 0952 MK-Given       0808 AF-Given       0813 AF-Given   1650-D/C'd         acetaminophen (Tylenol) tab 650 mg  Dose: 650 mg  Freq: Every 4 hours PRN Route: OR  PRN Reasons: mild pain,Fever,Headaches  Start: 05/04/25 0535 End: 05/06/25 1650   Admin Instructions:   Use PRN reason as a guide and follow range order policy     0959 MK-Given        1650-D/C'd         Vitals      Date/Time Temp Pulse Resp BP SpO2 Weight O2 Device O2 Flow Rate (L/min) [facility]    05/06/25 1131 97 °F (36.1 °C) 65 20 104/59 98 % -- None (Room air) -- HA    05/06/25 0810 97.6 °F (36.4 °C) 72 20 116/43 91 % -- None (Room air) -- HA    05/06/25 0540 98.7 °F (37.1 °C) 68 20 125/49 96 % -- CPAP --     05/06/25 0014 98 °F (36.7 °C) 67 16 124/49 98 % -- CPAP --     05/05/25 2005 98.9 °F (37.2 °C) 81 20 122/40 94 % -- None  (Room air) -- SF    05/05/25 1938 -- -- 20 -- -- -- -- -- SR    05/05/25 1548 99.5 °F (37.5 °C) 81 20 111/52 96 % -- None (Room air) -- HA    05/05/25 1150 97.9 °F (36.6 °C) 75 18 111/56 97 % -- None (Room air) -- TT    05/05/25 0809 -- 87 -- 104/54 91 % -- -- -- AF    05/05/25 0809 98.4 °F (36.9 °C) -- 18 -- -- -- None (Room air) -- TT    05/05/25 0345 98 °F (36.7 °C) 77 16 111/37 98 % -- -- -- LAURITA    05/05/25 0000 -- -- -- -- 93 % -- -- -- LR    05/05/25 0000 98.4 °F (36.9 °C) 90 18 139/35 -- -- -- -- LAURITA          PLEASE GIVE RECONSIDERATION/APPROVAL TO THIS INPT ADMISSION

## 2025-05-09 NOTE — PAYOR COMM NOTE
PLEASE GIVE RECONSIDERATION/APPROVAL TO THIS INPT ADMISSION       DISCHARGE REVIEW    Payor: JOSEFA FERRARA [facility]  Subscriber #:  [id number]  Authorization Number: [id number]    Admit date: 25  Admit time:   5:31 AM  Discharge Date: 2025  2:50 PM     Admitting Physician: Nazia Montoya DO  Primary Care Physician: Oneida James MD    Discharge Summary signed by Stephani Coy DO at 2025  1:36 PM       Author: Stephani Coy DO Specialty: HOSPITALIST Author Type: Physician    Filed: 2025  1:36 PM Date of Service: 2025  8:17 AM [facility]  [facility]CHARGE SUMMARY     Huyen Renner Patient Status:  Inpatient    1950 MRN [id number]   [facility] 4- Attending Stephani Coy DO   Hosp Day # 2 PCP Oneida James MD     Date of Admission: 5/3/2025  Date of Discharge:   25  Discharge Disposition: Home or Self Care    Discharge Diagnosis:  #sigmoid diverticulitis  #leukocytosis  #CKD 4  #DM2  #HTN  #HLD  #Morbid obesity; Body mass index is 43.08 kg/m².    History of Present Illness: (per Dr. Montoya), 74 year old female with PMHx HFpEF/ DM/ HTN/ HLD/ HEATH who presented to the hospital for abdominal pain. Her pain began 3 days ago and felt like period cramping in her bilateral lower quadrants. She rated her pain as a 2-10/10 with improvement with IV pain medication and no exacerbating factors. She denied any fever, chills, nausea, emesis, diarrhea, constipation.     Brief Synopsis: admitted with sigmoid diverticulitis. Treated supportively with IVF, pain control, bowel rest, and empiric IV abx. General surgery was consulted and recommended non-invasive management. Her clinical condition improved, her diet was advanced, and she was discharged to home on course of PO abx and recommendation to f/u closely with PCP and general surgery in outpt setting.     Lace+ Score: 67  59-90 High Risk  29-58 Medium Risk  0-28   Low Risk       TCM Follow-Up  Recommendation:  LACE > 58: High Risk of readmission after discharge from the hospital.    Incidental or significant findings and recommendations (brief descriptions):  As above    Consultants:  General surgery     Discharge Medication List:  START taking these medications        Instructions Prescription details   cefpodoxime 200 MG Tabs  Commonly known as: Vantin      Take 1 tablet (200 mg total) by mouth daily for 8 days.   Stop taking on: May 13, 2025  Quantity: 8 tablet  Refills: 0     metroNIDAZOLE 500 MG Tabs  Commonly known as: Flagyl      Take 1 tablet (500 mg total) by mouth 3 (three) times daily for 8 days.   Stop taking on: May 13, 2025  Quantity: 24 tablet  Refills: 0            CONTINUE taking these medications        Instructions Prescription details   Accu-Chek Softclix Lancets Misc      TEST BLOOD SUGAR IN THE MORNING AND BEFORE BEDTIME AS DIRECTED   Quantity: 200 each  Refills: 3     BD Pen Needle Jackie 2nd Gen 32G X 4 MM Misc  Generic drug: Insulin Pen Needle      1 each in the morning, at noon, in the evening, and at bedtime.   Quantity: 400 each  Refills: 1     DropSafe Alcohol Prep 70 % Pads      USE AS DIRECTED TWICE DAILY WHEN CHECKING BLOOD SUGAR   Quantity: 200 each  Refills: 3     FreeStyle Debra 14 Day Sensor Misc      1 Application every 14 (fourteen) days.   Quantity: 6 each  Refills: 0     FreeStyle Debra 3 [place] Gin      1 kit daily.   Quantity: 1 each  Refills: 0     Insulin Lispro (1 Unit Dial) 100 UNIT/ML Sopn  Commonly known as: HumaLOG KwikPen      Inject 3 Units into the skin 3 (three) times daily before meals. Do not take if your pre-meal blood sugar level is lower than 110   Quantity: 15 mL  Refills: 0     Jardiance 10 MG Tabs  Generic drug: empagliflozin      TAKE 1 TABLET EVERY DAY   Quantity: 90 tablet  Refills: 0     Lantus SoloStar 100 UNIT/ML Sopn  Generic drug: insulin glargine      Inject 3 Units into the skin nightly.   Quantity: 15 mL  Refills: 0     lisinopril 10  MG Tabs  Commonly known as: Zestril      TAKE 1 TABLET EVERY DAY   Quantity: 90 tablet  Refills: 0     Metamucil Fiber Chew      Chew 1 tablet by mouth daily. 1 gummy daily   Refills: 0     Pravastatin Sodium 80 MG Tabs  Commonly known as: PRAVACHOL      TAKE 1 TABLET EVERY NIGHT   Quantity: 90 tablet  Refills: 1     ReliOn True Metrix Test Strips Strp  Generic drug: Glucose Blood      1 strip by Finger stick route 2 (two) times a day. Diagnosis code: E11.8   Quantity: 100 each  Refills: 0     Accu-Chek Guide Strp      TEST BLOOD SUGAR TWO TIMES DAILY   Quantity: 200 strip  Refills: 3     torsemide 20 MG Tabs  Commonly known as: Demadex      TAKE 1 TABLET EVERY DAY   Quantity: 90 tablet  Refills: 0     True Metrix Air Glucose Meter w/Device Kit      1 each 2 (two) times a day. Diagnosis code: E11.8   Quantity: 1 kit  Refills: 0     Accu-Chek Guide Me w/Device Kit      Take 1 Bottle by mouth in the morning, at noon, and at bedtime. DX E11.8   Quantity: 1 kit  Refills: 3       [place]    Follow-up appointment:   Oneida James MD  [address] [address] [place] [id number]  [phone]    Schedule an appointment as soon as possible for a visit in 1 week(s)    Luanne Palacios MD  [address] [facility] [id number] [phone]    Schedule an appointment as soon as possible for a visit in 3 week(s)    Vital signs:  Temp:  [97 °F (36.1 °C)-99.5 °F (37.5 °C)] 97 °F (36.1 °C)  Pulse:  [65-81] 65  Resp:  [16-20] 20  BP: (104-125)/(40-59) 104/59  SpO2:  [91 %-98 %] 98 %    Physical Exam:    General: No acute distress   Lungs: clear to auscultation  Cardiovascular: S1, S2  Abdomen: Soft    Stephani Coy, DO        REVIEWER COMMENTS      PLEASE GIVE RECONSIDERATION/APPROVAL TO THIS INPT ADMISSION

## 2025-05-14 RX ORDER — BLOOD SUGAR DIAGNOSTIC
STRIP MISCELLANEOUS 2 TIMES DAILY
Qty: 200 STRIP | Refills: 3 | Status: SHIPPED | OUTPATIENT
Start: 2025-05-14

## 2025-05-14 NOTE — TELEPHONE ENCOUNTER
Name from pharmacy: Accu-Chek Guide Test In Vitro Strip          Will file in chart as: ACCU-CHEK GUIDE TEST In Vitro Strip    Sig: TEST BLOOD SUGAR TWO TIMES DAILY    Disp: 200 strip    Refills: 3    Start: 5/14/2025    Class: Normal    Non-formulary    Last refill: 3/4/2025    Diabetic Supplies Protocol Vloxia0405/14/2025 05:52 AM   Protocol Details In person appointment or virtual visit in the past 12 mos or appointment in next 3 mos    Medication is active on med list      To be filled at: [facility] - [facility] [address] [facility] [phone], [phone]

## 2025-05-28 DIAGNOSIS — E11.8 CONTROLLED TYPE 2 DIABETES MELLITUS WITH COMPLICATION, WITHOUT LONG-TERM CURRENT USE OF INSULIN (HCC): ICD-10-CM

## 2025-05-28 RX ORDER — LANCETS
1 EACH MISCELLANEOUS 2 TIMES DAILY
Qty: 200 EACH | Refills: 3 | OUTPATIENT
Start: 2025-05-28

## 2025-05-28 RX ORDER — PRAVASTATIN SODIUM 80 MG/1
80 TABLET ORAL NIGHTLY
Qty: 90 TABLET | Refills: 3 | Status: SHIPPED | OUTPATIENT
Start: 2025-05-28

## 2025-05-28 NOTE — TELEPHONE ENCOUNTER
Name from pharmacy: Accu-Chek Softclix Lancets Miscellaneous          Will file in chart as: ACCU-CHEK SOFTCLIX LANCETS Does not apply Misc    Sig: TEST BLOOD SUGAR TWO TIMES DAILY    Disp: 200 each    Refills: 3    Start: 5/28/2025    Class: Normal    Non-formulary For: Controlled type 2 diabetes mellitus with complication, without long-term current use of insulin (HCC)    Last ordered: 2 months ago (3/20/2025) by Oneida James MD    Last refill: 3/17/2025    Rx #: [id number]    Diabetic Supplies Protocol Veijri5105/28/2025 02:47 AM   Protocol Details Medication is active on med list    In person appointment or virtual visit in the past 12 mos or appointment in next 3 mos      To be filled at: [facility] - [facility] [address] Pending [facility] to [facility] [phone], [phone]     Future Appointments   Date Time Provider Department Center   6/7/2025  9:30 AM Oneida James MD [facility] [address]   6/24/2025  8:00 AM Luanne Palacios MD [id number]   10/27/2025  9:00 AM Murray Contreras MD [facility] Pulm [facility] [place]     LOV: 3/1/25  Last r/f: 3/20/25 # 200 3 r/fs    RTC: 3 mon    Pt should have refills at pharmacy

## 2025-05-28 NOTE — TELEPHONE ENCOUNTER
Name from pharmacy: Pravastatin Sodium Oral Tablet 80 MG          Will file in chart as: PRAVASTATIN SODIUM 80 MG Oral Tab    Sig: TAKE 1 TABLET EVERY NIGHT    Disp: 90 tablet    Refills: 3    Start: 5/28/2025    Class: Normal    Non-formulary    Last ordered: 4 months ago (1/3/2025) by Oneida James MD    Last refill: 3/24/2025    Rx #: [id number]    Cholesterol Medication Protocol [id number]/28/2025 02:47 AM   Protocol Details ALT < 80    ALT resulted within past year    Lipid panel within past 12 months    In person appointment or virtual visit in the past 12 mos or appointment in next 3 mos    Medication is active on med list      To be filled at: [facility] - [facility] [address] [phone], [phone]

## 2025-06-01 LAB
CHOL/HDLC RATIO: 3.3 (CALC)
CHOLESTEROL, TOTAL: 132 MG/DL
HDL CHOLESTEROL: 40 MG/DL
HEMOGLOBIN A1C: 8.3 %
LDL-CHOLESTEROL: 69 MG/DL (CALC)
NON-HDL CHOLESTEROL: 92 MG/DL (CALC)
TRIGLYCERIDES: 156 MG/DL
TSH W/REFLEX TO FT4: 2.9 MIU/L (ref 0.4–4.5)

## 2025-06-05 ENCOUNTER — MED REC SCAN ONLY (OUTPATIENT)
Facility: CLINIC | Age: 75
End: 2025-06-05

## 2025-06-07 ENCOUNTER — OFFICE VISIT (OUTPATIENT)
Dept: FAMILY MEDICINE CLINIC | Facility: CLINIC | Age: 75
End: 2025-06-07
Payer: MEDICARE

## 2025-06-07 VITALS
SYSTOLIC BLOOD PRESSURE: 110 MMHG | HEIGHT: 64 IN | DIASTOLIC BLOOD PRESSURE: 60 MMHG | HEART RATE: 72 BPM | WEIGHT: 245 LBS | TEMPERATURE: 97 F | RESPIRATION RATE: 16 BRPM | OXYGEN SATURATION: 98 % | BODY MASS INDEX: 41.83 KG/M2

## 2025-06-07 DIAGNOSIS — Z12.31 BREAST CANCER SCREENING BY MAMMOGRAM: ICD-10-CM

## 2025-06-07 DIAGNOSIS — E11.22 TYPE 2 DIABETES MELLITUS WITH STAGE 3 CHRONIC KIDNEY DISEASE, WITHOUT LONG-TERM CURRENT USE OF INSULIN, UNSPECIFIED WHETHER STAGE 3A OR 3B CKD (HCC): ICD-10-CM

## 2025-06-07 DIAGNOSIS — Z00.00 WELL ADULT EXAM: Primary | ICD-10-CM

## 2025-06-07 DIAGNOSIS — N18.30 STAGE 3 CHRONIC KIDNEY DISEASE, UNSPECIFIED WHETHER STAGE 3A OR 3B CKD (HCC): ICD-10-CM

## 2025-06-07 DIAGNOSIS — Z74.1 REQUIRES ASSISTANCE WITH ACTIVITIES OF DAILY LIVING (ADL): ICD-10-CM

## 2025-06-07 DIAGNOSIS — N18.30 TYPE 2 DIABETES MELLITUS WITH STAGE 3 CHRONIC KIDNEY DISEASE, WITHOUT LONG-TERM CURRENT USE OF INSULIN, UNSPECIFIED WHETHER STAGE 3A OR 3B CKD (HCC): ICD-10-CM

## 2025-06-07 DIAGNOSIS — Z13.820 OSTEOPOROSIS SCREENING: ICD-10-CM

## 2025-06-07 DIAGNOSIS — G47.33 OSA ON CPAP: ICD-10-CM

## 2025-06-07 DIAGNOSIS — Z86.0101 HISTORY OF ADENOMATOUS POLYP OF COLON: ICD-10-CM

## 2025-06-07 DIAGNOSIS — I10 PRIMARY HYPERTENSION: ICD-10-CM

## 2025-06-07 DIAGNOSIS — E78.2 MIXED HYPERLIPIDEMIA: ICD-10-CM

## 2025-06-07 DIAGNOSIS — Z78.0 ASYMPTOMATIC MENOPAUSE: ICD-10-CM

## 2025-06-07 DIAGNOSIS — M85.80 OSTEOPENIA, UNSPECIFIED LOCATION: ICD-10-CM

## 2025-06-07 RX ORDER — INSULIN GLARGINE 100 [IU]/ML
6 INJECTION, SOLUTION SUBCUTANEOUS NIGHTLY
Qty: 15 ML | Refills: 0 | Status: SHIPPED | OUTPATIENT
Start: 2025-06-07

## 2025-06-07 NOTE — PROGRESS NOTES
Subjective:        Chief Complaint   Patient presents with    Wellness Visit     MA supervisit     HISTORY OF PRESENT ILLNESS  HPI  HPI obtained per patient report.  Huyen Renner is a pleasant 74 year old female presenting for a medicare supervisit.     She presents with her CGM monitor. Her current insulin regimen comprises 4 units of long-acting and 3 units of short-acting insulin. She denies any hypoglycemic episodes. Her fasting BG levels are usually around the 150s, and postprandial sometimes spike into the 200s.       Medicare Hearing Assessment:   Hearing Screening    Screening Method: Whisper Test  Whisper Test Result: Fail               General Health:  In the past six months, have you lost more than 10 pounds without trying?: (Patient-Rptd) 2 - No  Has your appetite been poor?: (Patient-Rptd) No  Type of Diet: (Patient-Rptd) Low Salt, Low Carb  How does the patient maintain a good energy level?: Other  How would you describe your daily physical activity?: (Patient-Rptd) None  How would you describe your current health state?: (Patient-Rptd) Fair  How do you maintain positive mental well-being?: (Patient-Rptd) Social Interaction, Visiting Family  On a scale of 0 to 10, with 0 being no pain and 10 being severe pain, what is your pain level?: (Patient-Rptd) 0 - (None)  In the past six months, have you experienced urine leakage?: (Patient-Rptd) 0-No  At any time do you feel concerned for the safety/well-being of yourself and/or your children, in your home or elsewhere?: (Patient-Rptd) No  Have you had any immunizations at another office such as Influenza, Hepatitis B, Tetanus, or Pneumococcal?: (Patient-Rptd) No    Fall Risk Assessment:   She has been screened for Falls and is low risk.     Cognitive Assessment:   She had a completely normal cognitive assessment - see flowsheet entries     Functional Ability/Status:   Huyen Renner has some abnormal functions as listed below:  She has Driving difficulties  based on screening of functional status. She has Hearing problems based on screening of functional status.    Depression Screening (PHQ-2/PHQ-9):   PHQ-2 SCORE: 0  , done 5/31/2025   Last [place] Suicide Screening on 6/7/2025 was No Risk.    Advanced Directives:       She does have a Living Will but we do NOT have it on file in Epic.    She does have a POA but we do NOT have it on file in Epic.        PAST PATIENT HISTORY  Past Medical History[1]  Past Surgical History[2]    CURRENT MEDICATIONS  Medications Taking[3]    HEALTH MAINTENANCE  Immunization History   Administered Date(s) Administered    >=3 YRS TRI  MULTIDOSE VIAL (42308) FLU CLINIC 11/24/2014    Covid-19 Vaccine Pfizer 30 mcg/0.3 ml 02/01/2021, 02/22/2021, 10/06/2021    Covid-19 Vaccine Pfizer Bivalent 30mcg/0.3mL 10/29/2022    Covid-19 Vaccine Pfizer Darwin-Sucrose 30 mcg/0.3 ml 07/30/2022    FLU VAC High Dose 65 YRS & Older PRSV Free (08817) 09/07/2017, 09/13/2020, 09/20/2021, 10/17/2022, 09/18/2023    FLUAD High Dose 65 yr and older (10453) 09/10/2018, 09/28/2019, 10/19/2024    Fluvirin, 3 Years & >, Im 11/24/2014    Fluzone Vaccine Medicare () 09/07/2017    Influenza 10/02/2019    Moderna Covid-19 Vaccine 50mcg/0.5ml 12yrs+ 09/30/2023    Novavax Covid-19 Vaccine 5mcg/0.5ml 12yrs+ (1903-0914) 10/19/2024    Pneumococcal (Prevnar 13) 06/20/2017    Pneumovax 23 09/10/2018   Deferred Date(s) Deferred    Pneumovax 23 08/16/2016       ALLERGIES AND DRUG REACTIONS  Allergies[4]    Family History[5]  Short Social Hx on File[6]    Review of Systems   All other systems reviewed and are negative.         Objective:      /60   Pulse 72   Temp 97.3 °F (36.3 °C) (Temporal)   Resp 16   Ht 5' 4\" (1.626 m)   Wt 245 lb (111.1 kg)   SpO2 98%   BMI 42.05 kg/m²   Body mass index is 42.05 kg/m².    Physical Exam  Vitals reviewed.   Constitutional:       General: She is not in acute distress.     Appearance: She is not ill-appearing, toxic-appearing or  diaphoretic.   HENT:      Head: Normocephalic and atraumatic.   Eyes:      General: No scleral icterus.        Right eye: No discharge.         Left eye: No discharge.      Extraocular Movements: Extraocular movements intact.      Conjunctiva/sclera: Conjunctivae normal.   Neck:      Thyroid: No thyroid mass, thyromegaly or thyroid tenderness.   Cardiovascular:      Rate and Rhythm: Normal rate and regular rhythm.      Heart sounds: Normal heart sounds.   Pulmonary:      Effort: Pulmonary effort is normal.      Breath sounds: Normal breath sounds.   Abdominal:      General: Bowel sounds are normal. There is no distension.      Palpations: Abdomen is soft. There is no mass.      Tenderness: There is no abdominal tenderness. There is no guarding or rebound.      Hernia: No hernia is present.   Musculoskeletal:      Cervical back: Neck supple. No tenderness.      Right lower leg: No edema.      Left lower leg: No edema.   Lymphadenopathy:      Cervical: No cervical adenopathy.   Neurological:      Mental Status: She is alert and oriented to person, place, and time.            Assessment and Plan:      1. Well adult exam (Primary)  -     [facility] TEO 2D+3D SCREENING BILAT (CPT=77067/27556); Future; Expected date: 06/07/2025  -     XR DEXA BONE DENSITOMETRY (CPT=77080); Future; Expected date: 06/07/2025  2. Requires assistance with activities of daily living (ADL)  3. Breast cancer screening by mammogram  -     [facility] TEO 2D+3D SCREENING BILAT (CPT=77067/25504); Future; Expected date: 06/07/2025  4. Osteopenia, unspecified location  -     XR DEXA BONE DENSITOMETRY (CPT=77080); Future; Expected date: 06/07/2025  5. Asymptomatic menopause  -     XR DEXA BONE DENSITOMETRY (CPT=77080); Future; Expected date: 06/07/2025  6. Osteoporosis screening  -     XR DEXA BONE DENSITOMETRY (CPT=77080); Future; Expected date: 06/07/2025  7. History of adenomatous polyp of colon  8. Type 2 diabetes mellitus with stage 3 chronic kidney disease,  without long-term current use of insulin, unspecified whether stage 3a or 3b CKD (HCC)  -     Lantus SoloStar; Inject 6 Units into the skin nightly.  Dispense: 15 mL; Refill: 0  9. Stage 3 chronic kidney disease, unspecified whether stage 3a or 3b CKD (HCC)  Overview:  stable  10. Primary hypertension  11. HEATH on CPAP  12. Mixed hyperlipidemia    Return in about 3 months (around 9/7/2025) for follow-up.    Medicare Wellness Visit  - her  assists her with her ADLs  - her annual screening mammogram will be due in September. An order was placed for her in advance   - her DEXA scan 2/2020 showed osteopenia and she is due to update this scan  - her most recent colonoscopy was 11/2023. She has a history of adenomatous polyps. We will discuss her next screening colonoscopy in 11/2026    DM2  - c/b CKD 3  - uncontrolled, but her A1c is improving, now 8.3  - we will increase her lantus dose to 6 units daily and continue her humalog at 3 units TIDWM  - continue jardiance  - she is asked to follow-up in 3 months with a POC A1C     HTN  - well controlled with lisinopril  - if remains well within goal at next visit, we will consider reducing her lisinopril dose    HLD  - controlled with pravastatin  - lipid panel 5/31/25 reviewed  - we will CTM annually        Patient verbalized understanding of assessment and recommendations. All questions and concerns were addressed.    Electronically signed by Oneida James MD         [1]   Past Medical History:   Acute congestive heart failure (HCC)    Acute diastolic heart failure (HCC)    Acute renal failure, unspecified acute renal failure type    JENNIFER (acute kidney injury)    ATN (acute tubular necrosis)    Bronchopneumonia, organism unspecified    Congestive heart disease (HCC)    Diabetes (HCC)    Essential hypertension    Hearing impairment    High blood pressure    High cholesterol    Hyperkalemia    Hyperlipidemia    Hyponatremia    Laboratory examination ordered as part of a  routine general medical examination    Obesity    HEATH (obstructive sleep apnea)    on NIV    Osteopenia    Pure hypercholesterolemia    Sleep apnea    Thrombocytopenia    Visual impairment    Reading glasses   [2]   Past Surgical History:  Procedure Laterality Date    Cholecystectomy      Colonoscopy      Colonoscopy N/A 2017    Procedure: COLONOSCOPY;  Surgeon: Mayo Sewell MD;  Location: [facility] ENDOSCOPY    Colonoscopy  2023    Colonoscopy N/A 2018    Procedure: COLONOSCOPY;  Surgeon: Mayo Sewell MD;  Location: [facility] ENDOSCOPY    Colonoscopy N/A 2023    Procedure: COLONOSCOPY;  Surgeon: Mayo Sewell MD;  Location: [facility] ENDOSCOPY    Hysterectomy      partial hysteectomy          Tonsillectomy     [3]   Outpatient Medications Marked as Taking for the 25 encounter (Office Visit) with Oneida James MD   Medication Sig Dispense Refill    insulin glargine (LANTUS SOLOSTAR) 100 UNIT/ML Subcutaneous Solution Pen-injector Inject 6 Units into the skin nightly. 15 mL 0    PRAVASTATIN SODIUM 80 MG Oral Tab TAKE 1 TABLET EVERY NIGHT 90 tablet 3    ACCU-CHEK GUIDE TEST In Vitro Strip TEST BLOOD SUGAR TWO TIMES DAILY 200 strip 3    Insulin Pen Needle (BD PEN NEEDLE SHANNAN 2ND GEN) 32G X 4 MM Does not apply Misc 1 each in the morning, at noon, in the evening, and at bedtime. 400 each 1    lisinopril 10 MG Oral Tab TAKE 1 TABLET EVERY DAY 90 tablet 0    Insulin Lispro, 1 Unit Dial, (HUMALOG KWIKPEN) 100 UNIT/ML Subcutaneous Solution Pen-injector Inject 3 Units into the skin 3 (three) times daily before meals. Do not take if your pre-meal blood sugar level is lower than 110 15 mL 0    empagliflozin (JARDIANCE) 10 MG Oral Tab TAKE 1 TABLET EVERY DAY 90 tablet 0    Continuous Glucose Sensor (FREESTYLE NICHELLE 14 DAY SENSOR) Does not apply Misc 1 Application every 14 (fourteen) days. 6 each 0    Continuous Glucose  (FREESTYLE NICHELLE 3 READER) Does not apply Device 1 kit daily. 1 each 0     torsemide 20 MG Oral Tab TAKE 1 TABLET EVERY DAY 90 tablet 0    ACCU-CHEK SOFTCLIX LANCETS Does not apply Misc TEST BLOOD SUGAR IN THE MORNING AND BEFORE BEDTIME AS DIRECTED 200 each 3    Blood Glucose Monitoring Suppl (ACCU-CHEK GUIDE ME) w/Device Does not apply Kit Take 1 Bottle by mouth in the morning, at noon, and at bedtime. DX E11.8 1 kit 3    Alcohol Swabs (DROPSAFE ALCOHOL PREP) 70 % Does not apply Pads USE AS DIRECTED TWICE DAILY WHEN CHECKING BLOOD SUGAR 200 each 3    Metamucil Fiber Oral Chew Tab Chew 1 tablet by mouth daily. 1 gummy daily      ACCU-CHEK GUIDE In Vitro Strip TEST BLOOD SUGAR TWO TIMES DAILY 200 strip 3    Blood Glucose Monitoring Suppl (TRUE METRIX AIR GLUCOSE METER) w/Device Does not apply Kit 1 each 2 (two) times a day. Diagnosis code: E11.8 1 kit 0    Glucose Blood (RELION TRUE METRIX TEST STRIPS) In Vitro Strip 1 strip by Finger stick route 2 (two) times a day. Diagnosis code: E11.8 100 each 0   [4] No Known Allergies  [5]   Family History  Problem Relation Age of Onset    Diabetes Mother     Stroke Mother     Other (cancer stomach) Father     Cancer Sister         breast    Diabetes Sister     Obesity Sister     Diabetes Brother     Obesity Brother    [6]   Social History  Socioeconomic History    Marital status:    Occupational History    Occupation: Other or Not Employed     Comment:    Tobacco Use    Smoking status: Former     Current packs/day: 0.00     Average packs/day: 1 pack/day for 19.0 years (19.0 ttl pk-yrs)     Types: Cigarettes     Start date:      Quit date:      Years since quittin.4    Smokeless tobacco: Never   Vaping Use    Vaping status: Never Used   Substance and Sexual Activity    Alcohol use: Never    Drug use: Never   Other Topics Concern    Caffeine Concern No    Stress Concern No    Weight Concern No    Special Diet No    Exercise No    Seat Belt No     Social Drivers of Health     Food Insecurity: No Food  Insecurity (5/4/2025)    NCSS - Food Insecurity     Worried About Running Out of Food in the Last Year: No     Ran Out of Food in the Last Year: No   Transportation Needs: No Transportation Needs (5/4/2025)    NCSS - Transportation     Lack of Transportation: No   Housing Stability: Not At Risk (5/4/2025)    NCSS - Housing/Utilities     Has Housing: Yes     Worried About Losing Housing: No     Unable to Get Utilities: No

## 2025-06-14 ENCOUNTER — PATIENT MESSAGE (OUTPATIENT)
Dept: FAMILY MEDICINE CLINIC | Facility: CLINIC | Age: 75
End: 2025-06-14

## 2025-06-19 RX ORDER — TORSEMIDE 20 MG/1
20 TABLET ORAL DAILY
Qty: 90 TABLET | Refills: 1 | Status: SHIPPED | OUTPATIENT
Start: 2025-06-19

## 2025-06-19 NOTE — TELEPHONE ENCOUNTER
Requesting Torsemide 20mg  LOV: 6/7/25 Physical  RTC: 3 months  Last Relevant Labs: 5/5/25  Filled: 4/2/25 #90 with 0 refills    Future Appointments   Date Time Provider Department Center   6/24/2025  8:00 AM Luanne Palacios MD [id number]   9/6/2025  9:00 AM Oneida James MD [facility] [address]   10/27/2025  9:00 AM Murray Contreras MD [facility] [place]     Hypertension Medications Protocol [id number]/14/2025 02:29 AM   Protocol Details   EGFRCR or GFRNAA > 50    CMP or BMP in past 12 months    Last BP reading less than 140/90    In person appointment or virtual visit in the past 12 mos or appointment in next 3 mos    Medication is active on med list     Rx sent to pharmacy per protocol

## 2025-06-24 ENCOUNTER — OFFICE VISIT (OUTPATIENT)
Facility: LOCATION | Age: 75
End: 2025-06-24
Payer: MEDICARE

## 2025-06-24 VITALS — HEART RATE: 74 BPM | OXYGEN SATURATION: 95 % | DIASTOLIC BLOOD PRESSURE: 62 MMHG | SYSTOLIC BLOOD PRESSURE: 131 MMHG

## 2025-06-24 DIAGNOSIS — K57.32 SIGMOID DIVERTICULITIS: Primary | ICD-10-CM

## 2025-06-24 PROCEDURE — 1159F MED LIST DOCD IN RCRD: CPT | Performed by: STUDENT IN AN ORGANIZED HEALTH CARE EDUCATION/TRAINING PROGRAM

## 2025-06-24 PROCEDURE — 99212 OFFICE O/P EST SF 10 MIN: CPT | Performed by: STUDENT IN AN ORGANIZED HEALTH CARE EDUCATION/TRAINING PROGRAM

## 2025-06-24 PROCEDURE — 3075F SYST BP GE 130 - 139MM HG: CPT | Performed by: STUDENT IN AN ORGANIZED HEALTH CARE EDUCATION/TRAINING PROGRAM

## 2025-06-24 PROCEDURE — 3078F DIAST BP <80 MM HG: CPT | Performed by: STUDENT IN AN ORGANIZED HEALTH CARE EDUCATION/TRAINING PROGRAM

## 2025-06-24 RX ORDER — ACYCLOVIR 800 MG/1
TABLET ORAL
Qty: 6 EACH | Refills: 3 | Status: SHIPPED | OUTPATIENT
Start: 2025-06-24

## 2025-06-24 NOTE — PROGRESS NOTES
Patient ID: Huyen Renner is a 74 year old female.    Chief Complaint   Patient presents with    New Patient     NP - 5/3 ER f/u Diverticulitis of colon with perforation Sigmoid diverticulitis, pt states she is feeling better, no symptoms.        HPI: Huyen Renner is a 74 year old female presents for follow-up.  Patient was first seen in the hospital at the beginning of May for acute diverticulitis with microperforation.  Patient was treated conservatively and was subsequently discharged home.  Today, she denies any new issues.  Her symptoms have completely resolved.  She is eating a high-fiber diet and having normal bowel movements daily.    Workup:   CT ABDOMEN+PELVIS(CPT=74176)  Result Date: 5/4/2025  CONCLUSION:  Long segment sigmoid diverticulitis with possible micro perforations/contained perforation versus air and diverticula.  There is extensive inflammatory stranding and free fluid in the left side of the pelvis.  The inflammatory process extends to the urinary bladder along its dome and left lateral wall with associated bladder thickening.  Colovesical fistula cannot entirely be excluded.  LOCATION:  [id number]   Dictated by (CST): Leatha Meredith MD on 5/04/2025 at 8:40 AM     Finalized by (CST): Leatha Meredith MD on 5/04/2025 at 8:43 AM         Past Medical History  Past Medical History[1]    Past Surgical History  Past Surgical History[2]    Medications  Current Medications[3]    Allergies  Allergies[4]    Social History  History   Smoking Status    Former    Types: Cigarettes   Smokeless Tobacco    Never     History   Alcohol Use Never     History   Drug Use Unknown       Family History  Family History[5]    Review of Systems  Review of Systems   Constitutional: Negative.    Respiratory: Negative.     Cardiovascular: Negative.    Gastrointestinal:  Negative for abdominal distention, abdominal pain, constipation, nausea and vomiting.       Exam  Vitals:    06/24/25 0804   BP: 131/62   Pulse: 74     Physical  Exam  Constitutional:       Appearance: Normal appearance.   Cardiovascular:      Rate and Rhythm: Normal rate.   Pulmonary:      Effort: Pulmonary effort is normal.   Abdominal:      General: There is no distension.      Palpations: Abdomen is soft.      Tenderness: There is no abdominal tenderness.   Skin:     General: Skin is warm and dry.   Neurological:      Mental Status: She is alert and oriented to person, place, and time.           Assessment/Plan  Assessment   Problem List Items Addressed This Visit          Gastrointestinal and Abdominal    Sigmoid diverticulitis - Primary       Huyen DANIELLA Renner is a 74 year old female with sigmoid diverticulitis    CT images were reviewed by me and with the patient  Patient has disease of her sigmoid colon  On physical exam, she is nontender  Patient's last colonoscopy was in 2023  At this time, I recommend continued observation  I discussed symptoms of diverticulitis and initial treatment with the patient  If she has any recurrent abdominal pain, she is to immediately go to a clear liquid diet for 1 day  If her pain improves, she can begin a soft diet but if her pain does not improve, she is to call my office as she may need antibiotics or CT imaging  If pain is severe or if she has any other associated symptoms like nausea, vomiting, fever, or chills, she is to go to the emergency room    Patient can follow-up as needed    Thank you for letting me participate in the care of this patient      Luanne Palacios MD  General Surgery  [facility]     CC:  Oneida James MD         [1]   Past Medical History:   Acute congestive heart failure (HCC)    Acute diastolic heart failure (HCC)    Acute renal failure, unspecified acute renal failure type    JENNIFER (acute kidney injury)    ATN (acute tubular necrosis)    Bronchopneumonia, organism unspecified    Congestive heart disease (HCC)    Diabetes (HCC)    Essential hypertension    Hearing impairment    High blood pressure     High cholesterol    Hyperkalemia    Hyperlipidemia    Hyponatremia    Laboratory examination ordered as part of a routine general medical examination    Obesity    HEATH (obstructive sleep apnea)    on NIV    Osteopenia    Pure hypercholesterolemia    Sleep apnea    Thrombocytopenia    Visual impairment    Reading glasses   [2]   Past Surgical History:  Procedure Laterality Date    Cholecystectomy      Colonoscopy      Colonoscopy N/A 2017    Procedure: COLONOSCOPY;  Surgeon: Mayo Sewell MD;  Location: [facility] ENDOSCOPY    Colonoscopy  2023    Colonoscopy N/A 2018    Procedure: COLONOSCOPY;  Surgeon: Mayo Sewell MD;  Location: [facility] ENDOSCOPY    Colonoscopy N/A 2023    Procedure: COLONOSCOPY;  Surgeon: Mayo Sewell MD;  Location: [facility] ENDOSCOPY    Hysterectomy      partial hysteectomy          Tonsillectomy     [3]   Current Outpatient Medications   Medication Sig Dispense Refill    torsemide 20 MG Oral Tab TAKE 1 TABLET EVERY DAY 90 tablet 1    insulin glargine (LANTUS SOLOSTAR) 100 UNIT/ML Subcutaneous Solution Pen-injector Inject 6 Units into the skin nightly. 15 mL 0    PRAVASTATIN SODIUM 80 MG Oral Tab TAKE 1 TABLET EVERY NIGHT 90 tablet 3    ACCU-CHEK GUIDE TEST In Vitro Strip TEST BLOOD SUGAR TWO TIMES DAILY 200 strip 3    Insulin Pen Needle (BD PEN NEEDLE SHANNAN 2ND GEN) 32G X 4 MM Does not apply Misc 1 each in the morning, at noon, in the evening, and at bedtime. 400 each 1    lisinopril 10 MG Oral Tab TAKE 1 TABLET EVERY DAY 90 tablet 0    Insulin Lispro, 1 Unit Dial, (HUMALOG KWIKPEN) 100 UNIT/ML Subcutaneous Solution Pen-injector Inject 3 Units into the skin 3 (three) times daily before meals. Do not take if your pre-meal blood sugar level is lower than 110 15 mL 0    empagliflozin (JARDIANCE) 10 MG Oral Tab TAKE 1 TABLET EVERY DAY 90 tablet 0    Continuous Glucose Sensor (FREESTYLE NICHELLE 14 DAY SENSOR) Does not apply Misc 1 Application every 14 (fourteen) days. 6  each 0    Continuous Glucose  (FREESTYLE NICHELLE 3 READER) Does not apply Device 1 kit daily. 1 each 0    ACCU-CHEK SOFTCLIX LANCETS Does not apply Misc TEST BLOOD SUGAR IN THE MORNING AND BEFORE BEDTIME AS DIRECTED 200 each 3    Blood Glucose Monitoring Suppl (ACCU-CHEK GUIDE ME) w/Device Does not apply Kit Take 1 Bottle by mouth in the morning, at noon, and at bedtime. DX E11.8 1 kit 3    Alcohol Swabs (DROPSAFE ALCOHOL PREP) 70 % Does not apply Pads USE AS DIRECTED TWICE DAILY WHEN CHECKING BLOOD SUGAR 200 each 3    Metamucil Fiber Oral Chew Tab Chew 1 tablet by mouth in the morning. 1 gummy daily.      ACCU-CHEK GUIDE In Vitro Strip TEST BLOOD SUGAR TWO TIMES DAILY 200 strip 3    Blood Glucose Monitoring Suppl (TRUE METRIX AIR GLUCOSE METER) w/Device Does not apply Kit 1 each 2 (two) times a day. Diagnosis code: E11.8 1 kit 0    Glucose Blood (RELION TRUE METRIX TEST STRIPS) In Vitro Strip 1 strip by Finger stick route 2 (two) times a day. Diagnosis code: E11.8 100 each 0   [4] No Known Allergies  [5]   Family History  Problem Relation Age of Onset    Diabetes Mother     Stroke Mother     Other (cancer stomach) Father     Cancer Sister         breast    Diabetes Sister     Obesity Sister     Diabetes Brother     Obesity Brother

## 2025-06-30 DIAGNOSIS — N18.4 CKD (CHRONIC KIDNEY DISEASE) STAGE 4, GFR 15-29 ML/MIN (HCC): ICD-10-CM

## 2025-06-30 DIAGNOSIS — E11.22 TYPE 2 DIABETES MELLITUS WITH STAGE 4 CHRONIC KIDNEY DISEASE, WITHOUT LONG-TERM CURRENT USE OF INSULIN (HCC): ICD-10-CM

## 2025-06-30 DIAGNOSIS — N18.4 TYPE 2 DIABETES MELLITUS WITH STAGE 4 CHRONIC KIDNEY DISEASE, WITHOUT LONG-TERM CURRENT USE OF INSULIN (HCC): ICD-10-CM

## 2025-07-01 RX ORDER — ACYCLOVIR 800 MG/1
TABLET ORAL
Qty: 6 EACH | Refills: 3 | OUTPATIENT
Start: 2025-07-01

## 2025-07-02 DIAGNOSIS — N18.4 CKD (CHRONIC KIDNEY DISEASE) STAGE 4, GFR 15-29 ML/MIN (HCC): ICD-10-CM

## 2025-07-02 DIAGNOSIS — E11.22 TYPE 2 DIABETES MELLITUS WITH STAGE 4 CHRONIC KIDNEY DISEASE, WITHOUT LONG-TERM CURRENT USE OF INSULIN (HCC): ICD-10-CM

## 2025-07-02 DIAGNOSIS — N18.4 TYPE 2 DIABETES MELLITUS WITH STAGE 4 CHRONIC KIDNEY DISEASE, WITHOUT LONG-TERM CURRENT USE OF INSULIN (HCC): ICD-10-CM

## 2025-07-02 RX ORDER — KETOROLAC TROMETHAMINE 30 MG/ML
1 INJECTION, SOLUTION INTRAMUSCULAR; INTRAVENOUS AS DIRECTED
Refills: 3 | OUTPATIENT
Start: 2025-07-02

## 2025-07-02 RX ORDER — EMPAGLIFLOZIN 10 MG/1
10 TABLET, FILM COATED ORAL DAILY
Qty: 90 TABLET | Refills: 1 | Status: SHIPPED | OUTPATIENT
Start: 2025-07-02

## 2025-07-02 NOTE — TELEPHONE ENCOUNTER
Requesting Jardiance 10mg  Filled: 4/10/25 #90 with 0 refills    LOV: 6/7/25 Physical  RTC: 3 months  Last Relevant Labs: 5/31/25    Future Appointments   Date Time Provider Department Center   9/6/2025  9:00 AM Oneida James MD [facility] [address]   10/27/2025  9:00 AM Murray Contreras MD [facility] [place]     Diabetes Medication Protocol [id number]/02/2025 02:24 PM   Protocol Details Last A1C < 7.5 and within past 6 months    EGFRCR or GFRNAA > 50    In person appointment or virtual visit in the past 6 mos or appointment in next 3 mos    Microalbumin procedure in past 12 months or taking ACE/ARB    GFR in the past 12 months    Medication is active on med list     -Rx for Freestyle Debra denied as duplicate. Rx was sent 6/24/25    Rx for Jardiance sent to pharmacy per protocol

## 2025-07-03 DIAGNOSIS — I10 PRIMARY HYPERTENSION: ICD-10-CM

## 2025-07-03 RX ORDER — INSULIN LISPRO 100 [IU]/ML
INJECTION, SOLUTION INTRAVENOUS; SUBCUTANEOUS
Qty: 15 ML | Refills: 0 | Status: SHIPPED | OUTPATIENT
Start: 2025-07-03

## 2025-07-03 RX ORDER — LISINOPRIL 10 MG/1
10 TABLET ORAL DAILY
Qty: 90 TABLET | Refills: 1 | Status: SHIPPED | OUTPATIENT
Start: 2025-07-03

## 2025-07-03 NOTE — TELEPHONE ENCOUNTER
t  Medications from outside sources need reconciliation.             Requested Renewals     Name from pharmacy: HumaLOG KwikPen Subcutaneous Solution Pen-injector 100 UNIT/ML         Will file in chart as: HUMALOG KWIKPEN 100 UNIT/ML Subcutaneous Solution Pen-injector    Sig: INJECT 3 UNITS INTO THE SKIN 3 TIMES DAILY BEFORE MEALS. DONT TAKE IF PRE-MEAL BLOOD SUGAR LEVEL IS UNDER 110    Disp: 15 mL    Refills: 3    Start: 7/2/2025    Class: Normal    Non-formulary For: Type 2 diabetes mellitus with stage 4 chronic kidney disease, without long-term current use of insulin ([facility]); CKD (chronic kidney disease) stage 4, GFR 15-29 ml/min ([facility])    Last ordered: 2 months ago (4/14/2025) by Oneida James MD    Last refill: 4/18/2025    Rx #: [id number]    Diabetes Medication Protocol [id number]/02/2025 10:28 AM   Protocol Details Last A1C < 7.5 and within past 6 months    EGFRCR or GFRNAA > 50    Medication is active on med list    In person appointment or virtual visit in the past 6 mos or appointment in next 3 mos    Microalbumin procedure in past 12 months or taking ACE/ARB    GFR in the past 12 months

## 2025-07-16 DIAGNOSIS — N18.4 CKD (CHRONIC KIDNEY DISEASE) STAGE 4, GFR 15-29 ML/MIN (HCC): ICD-10-CM

## 2025-07-16 DIAGNOSIS — E11.22 TYPE 2 DIABETES MELLITUS WITH STAGE 4 CHRONIC KIDNEY DISEASE, WITHOUT LONG-TERM CURRENT USE OF INSULIN (HCC): ICD-10-CM

## 2025-07-16 DIAGNOSIS — N18.4 TYPE 2 DIABETES MELLITUS WITH STAGE 4 CHRONIC KIDNEY DISEASE, WITHOUT LONG-TERM CURRENT USE OF INSULIN (HCC): ICD-10-CM

## 2025-07-22 RX ORDER — INSULIN LISPRO 100 [IU]/ML
INJECTION, SOLUTION INTRAVENOUS; SUBCUTANEOUS
Qty: 15 ML | Refills: 0 | OUTPATIENT
Start: 2025-07-22

## 2025-07-28 RX ORDER — PEN NEEDLE, DIABETIC 32GX 5/32"
1 NEEDLE, DISPOSABLE MISCELLANEOUS 4 TIMES DAILY
Qty: 400 EACH | Refills: 1 | Status: SHIPPED | OUTPATIENT
Start: 2025-07-28

## 2025-07-28 NOTE — TELEPHONE ENCOUNTER
Requested Renewals     Insulin Pen Needle (BD PEN NEEDLE SHANNAN 2ND GEN) 32G X 4 MM Does not apply Misc         Si each in the morning, at noon, in the evening, and at bedtime.    Disp: 400 each    Refills: 1    Start: 2025    Class: Normal    Non-formulary    Last ordered: 2 months ago (2025) by Oneida James MD    Diabetic Supplies Protocol [id number] 11:34 AM   Protocol Details In person appointment or virtual visit in the past 12 mos or appointment in next 3 mos    Medication is active on med list             Future Appointments   Date Time Provider Department Center   2025  9:00 AM Oneida James MD [facility] [address]   10/27/2025  9:00 AM Murray Contreras MD [facility] [place]     LOV: 25 for wellness visit  RTC: 3 months    HGBA1C:    Lab Results   Component Value Date    A1C 8.3 (H) 2025    A1C 8.8 (H) 2025    A1C 8.5 (H) 2024     (H) 2017     RX sent.

## 2025-08-03 DIAGNOSIS — E11.8 CONTROLLED TYPE 2 DIABETES MELLITUS WITH COMPLICATION, WITHOUT LONG-TERM CURRENT USE OF INSULIN (HCC): ICD-10-CM

## 2025-08-05 RX ORDER — LANCETS
EACH MISCELLANEOUS
Qty: 200 EACH | Refills: 3 | Status: SHIPPED | OUTPATIENT
Start: 2025-08-05

## 2025-08-18 ENCOUNTER — TELEPHONE (OUTPATIENT)
Dept: FAMILY MEDICINE CLINIC | Facility: CLINIC | Age: 75
End: 2025-08-18

## 2025-08-31 LAB
APPEARANCE: CLEAR
BILIRUBIN: NEGATIVE
COLOR: YELLOW
CREATININE, RANDOM URINE: 19 MG/DL (ref 20–275)
KETONES: NEGATIVE
NITRITE: NEGATIVE
OCCULT BLOOD: NEGATIVE
PH: 6 (ref 5–8)
PROTEIN, TOTAL, RANDOM UR: 4 MG/DL (ref 5–24)
PROTEIN: NEGATIVE
SPECIFIC GRAVITY: 1.01 (ref 1–1.03)

## (undated) DIAGNOSIS — E11.9 TYPE 2 DIABETES MELLITUS WITHOUT COMPLICATION, UNSPECIFIED WHETHER LONG TERM INSULIN USE (HCC): ICD-10-CM

## (undated) DIAGNOSIS — E11.8 CONTROLLED DIABETES MELLITUS TYPE 2 WITH COMPLICATIONS, UNSPECIFIED WHETHER LONG TERM INSULIN USE (HCC): Primary | ICD-10-CM

## (undated) DEVICE — REM POLYHESIVE ADULT PATIENT RETURN ELECTRODE: Brand: VALLEYLAB

## (undated) DEVICE — 60 ML SYRINGE REGULAR TIP: Brand: MONOJECT

## (undated) DEVICE — Device: Brand: DUAL NARE NASAL CANNULAE FEMALE LUER CON 7FT O2 TUBE

## (undated) DEVICE — MEDI-VAC NON-CONDUCTIVE SUCTION TUBING 6MM X 1.8M (6FT.) L: Brand: CARDINAL HEALTH

## (undated) DEVICE — SNARE CAPTIFLEX MICRO-OVL OLY

## (undated) DEVICE — ENDOSCOPY PACK - LOWER: Brand: MEDLINE INDUSTRIES, INC.

## (undated) DEVICE — SNARE ENDOSCOPIC 10MM ROUND

## (undated) DEVICE — Device: Brand: DEFENDO AIR/WATER/SUCTION AND BIOPSY VALVE

## (undated) DEVICE — SNARE OPTMZ PLPCTM TRP

## (undated) DEVICE — NEEDLE CONTRAST INTERJECT 25G

## (undated) DEVICE — STERILE LATEX POWDER-FREE SURGICAL GLOVESWITH NITRILE COATING: Brand: PROTEXIS

## (undated) DEVICE — CLIP RESOLUTION 235CM

## (undated) DEVICE — KIT ENDO ORCAPOD 160/180/190

## (undated) DEVICE — KIT CLEAN ENDOKIT 1.1OZ GOWNX2

## (undated) DEVICE — FIAPC® PROBE W/ FILTER 2200 A OD 2.3MM/6.9FR; L 2.2M/7.2FT: Brand: ERBE

## (undated) DEVICE — LINE MNTR ADLT SET O2 INTMD

## (undated) NOTE — LETTER
Your patient was recently seen at the [facility] for a hospital follow-up visit. The visit note is attached. Please contact the clinic with any questions at [phone].     Thank you,  KATE Ramires

## (undated) NOTE — LETTER
10/11/18        Huyen BREWER [address] [facility]      Dear Micha Henry,    [facility] records indicate that you have outstanding lab work and or testing that was ordered for you and has not yet been completed:  Orders Placed This Encounter      VITAM

## (undated) NOTE — Clinical Note
Date: 6/8/2017    Patient Name: Susan Dai          To Whom it may concern: The above patient was seen at the [facility] for treatment of a medical condition.     This patient should be excused from attending work from Thursday, June 8,

## (undated) NOTE — Clinical Note
Initial assessment completed with patient.  message sent to office to assist in scheduling.  Thank you!

## (undated) NOTE — IP AVS SNAPSHOT
[facility] [place] [address] [place], [address] ~ [phone]                Discharge Summary   6/2/2017    Han Malcolm           Admission Information        Provider Department    6/2/2017 Gabrielle Aden MD  [id number]         Thank Zain Falcon                        Glucose Blood Strp   Commonly known as:  ACCU-CHEK SMARTVIEW        Check blood sugar morning fasting and before supper    Yung Wood                              lisinopril 10 MG Tabs   Last time this was given WBC RBC Hemoglobin Hematocrit MCV MCH MCHC RDW Platelet MPV    (52/91/62)  14.8 (H) (06/02/17)  4.68 (06/02/17)  13.8 (06/02/17)  42.3 (06/02/17)  90.4 -- -- -- (06/02/17)  205.0 --      Recent Hematology Lab Results (cont.)  (Last 3 results in the past 9 coverage. Patient [address] is a Federal Navigator program that can help with your Affordable Care Act coverage, as well as all types of Medicaid plans.   To get signed up and covered, please call [phone] and ask to get set up for an insuran weakness, numbness           Cholesterol Lowering Medications     Pravastatin Sodium 40 MG Oral Tab       Use: Lower cholesterol, protect your heart   Most common side effects: Dizziness, constipation, abnormal liver function   What to report to your healt

## (undated) NOTE — MR AVS SNAPSHOT
[facility] [address] [place] [address] 700 [facility]  [address] [address] [phone]               Thank you for choosing us for your health care visit with Divya Calzada MD.  We are glad to serve you and happy to provide you wit What changed:  Another medication with the same name was added. Make sure you understand how and when to take each.    Commonly known as:  GLUCOPHAGE           * MetFORMIN HCl 850 MG Tabs   Take 1 tablet (850 mg total) by mouth 2 (two) times daily with meal Don’t eat while distracted and slow down. Avoid over sized portions. Don’t eat while when you’re bored.      EAT THESE FOODS MORE OFTEN: EAT THESE FOODS LESS OFTEN:   Make half your plate fruits and vegetables Highly refined, white starches including wh

## (undated) NOTE — LETTER
Date: 5/22/2018    Patient Name: Jammie Irizarry    To Whom It May Concern:       Metro Schaumann is a patient under my care. She is taking a medication called fluoxetine for treatment of stress, anxiety, and depression.      This medication was started due to stress w

## (undated) NOTE — IP AVS SNAPSHOT
[facility]    [address], [place], [place] ~ [phone]                Discharge Summary   5/1/2017    Myesha Johnson           Admission Information        Provider Department    5/1/2017 Isaías Payne MD [facility] Endoscop tissue) for a day or two after the exam. This is normal.  - If you experience any rectal bleeding (not spotting), persistent tenderness or sharp severe abdominal pains, oral temperature over 100 degrees Fahrenheit, light-headedness or dizziness, or any oth coverage. Patient [address] is a Federal Navigator program that can help with your Affordable Care Act coverage, as well as all types of Medicaid plans.   To get signed up and covered, please call [phone] and ask to get set up for an insuran

## (undated) NOTE — MR AVS SNAPSHOT
[facility] [address] [place] [address]  [facility] [id number] [phone]               Thank you for choosing us for your health care visit with Columbus Rubinstein, MD.  We are glad to serve you and happy to provide you wit Beclomethasone Dipropionate 80 MCG/ACT Aers   Inhale 2 puffs (0.16 mg total) into the lungs 2 (two) times daily.    Commonly known as:  QVAR           Glucose Blood Strp   Check blood sugar morning fasting and before supper   Commonly known as:  ACCU-CHEK

## (undated) NOTE — LETTER
Date: 4/12/2021    Patient Name: Yulissa Close          To Whom it may concern: For medical reasons should be excused from jury duty as she has significant hearing problems, walking issues, and gets shortness of breath easily.          Sincerely,    Vi

## (undated) NOTE — LETTER
Date: 6/27/2017    Patient Name: Roberto Tapia          To Whom it may concern: The above patient was seen at the [facility] for treatment of a medical condition. This patient no longer needs oxygen tank and compressor.  Please  m

## (undated) NOTE — LETTER
Consent to Procedure/Sedation    Date: _____12/27/2019_____________    Time: ______1000_________    1. I authorize the performance upon Irma Kaba the following:   Temporary HD Catheter Placemnt        2.  I authorize Dr. Casanova________________________ Mychal Mehta Signature of person authorized to consent for patient: Relationship to patient:  ___________________________    ___________________    Witness: ____________________     Date: ______________    Printed: 12/27/2019   10:05 AM    Patient Name: Yulissa Boone

## (undated) NOTE — MR AVS SNAPSHOT
[facility] [address] [place] [address] 700 [facility]  [address] [address] [phone]               Thank you for choosing us for your health care visit with Joseluis Bray MD.  We are glad to serve you and happy to provide you wit Fluticasone Propionate  MCG/ACT Aero   Inhale 2 puffs into the lungs 2 (two) times daily.    Commonly known as:  FLOVENT HFA           Glucose Blood Strp   Check blood sugar morning fasting and before supper   Commonly known as:  ACCU-CHEK SMARTV

## (undated) NOTE — Clinical Note
· Arrange a NEW AVAPS PSMin 5-10 cwp and PS Max 15-25 cmH2O ,EPAP 10 cwp and rate 15/min regularly with  ml, with Oxygen 3 LPM  · Then obtain download on AVAPS  · Check Overnight oximetry on 3 LPM oxygen and AVAPS

## (undated) NOTE — MR AVS SNAPSHOT
[facility] [address] [place] [address] 700 [facility]  [phone] [phone]               Thank you for choosing us for your health care visit with Marisue Felty, MD.  We are glad to serve you and happy to provide you wit Commonly known as:  PRAVACHOL                   MyChart     Visit MyChart  You can access your MyChart to more actively manage your health care and view more details from this visit by going to [email or web address].   If you've recently had a stay a

## (undated) NOTE — LETTER
[address]  Authorization for Surgical Operation and Procedure                                                                                           I hereby authorize Priti Todd MD, my physician and his/her assistants (if applicable), which may include medical students, residents, and/or fellows, to perform the following surgical operation/ procedure and administer such anesthesia as may be determined necessary by my physician: Operation/Procedure name (s) COLONOSCOPY on [address]   2. I recognize that during the surgical operation/procedure, unforeseen conditions may necessitate additional or different procedures than those listed above. I, therefore, further authorize and request that the above-named surgeon, assistants, or designees perform such procedures as are, in their judgment, necessary and desirable. 3.   My surgeon/physician has discussed prior to my surgery the potential benefits, risks and side effects of this procedure; the likelihood of achieving goals; and potential problems that might occur during recuperation. They also discussed reasonable alternatives to the procedure, including risks, benefits, and side effects related to the alternatives and risks related to not receiving this procedure. I have had all my questions answered and I acknowledge that no guarantee has been made as to the result that may be obtained. 4.   Should the need arise during my operation/procedure, which includes change of level of care prior to discharge, I also consent to the administration of blood and/or blood products. Further, I understand that despite careful testing and screening of blood or blood products by collecting agencies, I may still be subject to ill effects as a result of receiving a blood transfusion and/or blood products.   The following are some, but not all, of the potential risks that can occur: fever and allergic reactions, hemolytic reactions, transmission of diseases such as Hepatitis, AIDS and Cytomegalovirus (CMV) and fluid overload. In the event that I wish to have an autologous transfusion of my own blood, or a directed donor transfusion, I will discuss this with my physician. Check only if Refusing Blood or Blood Products  I understand refusal of blood or blood products as deemed necessary by my physician may have serious consequences to my condition to include possible death. I hereby assume responsibility for my refusal and release the hospital, its personnel, and my physicians from any responsibility for the consequences of my refusal.    o  Refuse   5. I authorize the use of any specimen, organs, tissues, body parts or foreign objects that may be removed from my body during the operation/procedure for diagnosis, research or teaching purposes and their subsequent disposal by hospital authorities. I also authorize the release of specimen test results and/or written reports to my treating physician on the hospital medical staff or other referring or consulting physicians involved in my care, at the discretion of the Pathologist or my treating physician. 6.   I consent to the photographing or videotaping of the operations or procedures to be performed, including appropriate portions of my body for medical, scientific, or educational purposes, provided my identity is not revealed by the pictures or by descriptive texts accompanying them. If the procedure has been photographed/videotaped, the surgeon will obtain the original picture, image, videotape or CD. The hospital will not be responsible for storage, release or maintenance of the picture, image, tape or CD.    7.   I consent to the presence of a  or observers in the operating room as deemed necessary by my physician or their designees.     8.   I recognize that in the event my procedure results in extended X-Ray/fluoroscopy time, I may develop a skin reaction. 9. If I have a Do Not Attempt Resuscitation (DNAR) order in place, that status will be suspended while in the operating room, procedural suite, and during the recovery period unless otherwise explicitly stated by me (or a person authorized to consent on my behalf). The surgeon or my attending physician will determine when the applicable recovery period ends for purposes of reinstating the DNAR order. 10. Patients having a sterilization procedure: I understand that if the procedure is successful the results will be permanent and it will therefore be impossible for me to inseminate, conceive, or bear children. I also understand that the procedure is intended to result in sterility, although the result has not been guaranteed. 11. I acknowledge that my physician has explained sedation/analgesia administration to me including the risk and benefits I consent to the administration of sedation/analgesia as may be necessary or desirable in the judgment of my physician. I CERTIFY THAT I HAVE READ AND FULLY UNDERSTAND THE ABOVE CONSENT TO OPERATION and/or OTHER PROCEDURE.     _________________________________________ _________________________________     ___________________________________  Signature of Patient     Signature of Responsible Person                   Printed Name of Responsible Person                              _________________________________________ ______________________________        ___________________________________  Signature of Witness         Date  Time         Relationship to Patient    STATEMENT OF PHYSICIAN My signature below affirms that prior to the time of the procedure; I have explained to the patient and/or his/her legal representative, the risks and benefits involved in the proposed treatment and any reasonable alternative to the proposed treatment.  I have also explained the risks and benefits involved in refusal of the proposed treatment and alternatives to the proposed treatment and have answered the patient's questions.  If I have a significant financial interest in a co-management agreement or a significant financial interest in any product or implant, or other significant relationship used in this procedure/surgery, I have disclosed this and had a discussion with my patient.     _______________________________________________________________ _____________________________  Amina Dear of Physician)                                                                                         (Date)                                   (Time)  Patient Name: Óscar Brantley    : 1950   Printed: 2023      Medical Record #: [id number]                                              Page 1 of 1

## (undated) NOTE — Clinical Note
02/27/2017            Dear Patient:    I am writing to you to remind you to schedule your annual diabetic eye exam.  Diabetes remains one of the leading causes of blindness in American adults.   Early diagnosis is important in preventing the progression to

## (undated) NOTE — LETTER
CONTINUOUS GLUCOSE MONITOR AND INSULIN PUMP AGREEMENT     CONTINUOUS GLUCOSE MONITOR (CGM) (If not signed, not applicable)     CGMs are not currently approved by the FDA for use in the hospital. If you choose to continue to wear your CGM in the hospital, we ask that you agree to the following:     ?   Allow finger stick blood glucose tests to be performed using hospital glucose monitor.   ?   Insulin dosing and hypoglycemia (low blood sugar) treatment will be provided based on hospital glucose monitor        results.   ?   Remove your CGM on or before the date it is due to be removed or as ordered by physician.   ?   Remove your CGM prior to any scheduled surgery and as instructed for procedures.   ?   Remove your CGM prior to Magnetic Resonance Imaging (MRI), Computerized Tomography (CT) or x-ray as it can      damage your CGM.   ?   Inform staff of any skin irritation, redness, or other problems with your skin or CGM.     __________________________________________________________ ________________________________   Patient/Guardian Signature                                                                                  Date/Time     __________________________________________________________   Print Guardian Name     __________________________________________________________ ________________________________   Staff/RN Signature                                                                                                 Date/Time      INSULIN PUMP (If not signed, not applicable)     For your safety and best possible care, we ask that you agree to the following. If you cannot agree to the following requirements of this agreement, or, if it is not possible for you to meet these requirements, we will provide and administer insulin based on your provider’s orders.     ?   Communicate your site changes, carbohydrate intake, and all boluses to your nurse.   ?   Provide your own insulin and pump supplies.   ?   Change  your infusion site at least every 3 days, and as needed for skin irritation or two consecutive glucose readings       greater than 240 mg/dL.   ?   Make no changes to your basal rates, carb ratios, or correction factor unless directed to do so by the physician        managing your insulin pump.   ?   Notify your nurse before you eat so that your blood glucose level can be obtained with hospital glucose monitor.   ?   Report symptoms of low blood sugar to your nurse immediately.   ?   Notify your nurse of any problems with your pump that you cannot correct.     I understand that my insulin pump may be discontinued and a different method of insulin delivery will be provided for any of the following reasons:     ?   Inability to safely perform diabetes self-management activities because of the condition for which I was hospitalized,        or side effects of my treatment.   ? Inability or inconsistency in performing the diabetes self-management activities described above.     __________________________________________________________ ________________________________   Patient/Guardian Signature                                                                                 Date/Time     __________________________________________________________   Print Guardian Name     __________________________________________________________ ________________________________   Staff/RN Signature                                                                                                 Date/Time       Patient Name: Huyen Renner     : 1950                 Printed: May 4, 2025     Medical Record #: [id number]                                            Page         INSULIN PUMP NURSING CHECKLIST   ON ADMISSION     ? Document insulin pump in Medical Devices/Implants section of Epic (even if patient not wearing pump in hospital).   ? Use link in Insulin Pump BPA to print the CGM/Insulin Pump Agreement and Checklists.    ?  Apply patient label to Agreement and have patient read and sign it. Place on chart.   ? Add Insulin Pump LDA to the IV Assessment flowsheet.   ? Endocrinology to be consulted (except at [facility]):    ? Endy: Attending to initiate consult.    ?  Melvin: Open endocrinologist order through BPA (no cosign required). Page on-call endocrinologist          through [employer].    ?  Herve Beth: Notify attending of insulin pump.     EVERY SHIFT     ? Document pump site assessment and any site changes. ADMISSION   ? Chart ALL insulin bolus doses in MAR as “Self-administered via pump”.     PROTOCOL REMINDERS     ? Insulin Pump Focused order set is to be used for all patients using an insulin pump.   ? Patient to provide own pump supplies and insulin.   ? Point of care glucose to be performed using hospital glucometer, even if wearing a continuous glucose           monitor.     NOTIFY ENDOCRINOLOGY OR MFM  FOR: (at [facility], notify attending)     Temporary discontinuation of pump infusion for more than one hour   Surgery   Change in patient condition, mental status, or compliance that prohibits ability to self-manage the insulin pump   Patient reports insulin pump not operating properly   Patient does not have appropriate insulin or supplies for insulin pump   Risk for suicide   Blood glucose outside ranges indicated in insulin pump orders       DO NOT REMOVE INSULIN PUMP WITHOUT ORDER FROM ENDOCRINOLOGIST/MGM -  or attending at [facility]     NOT PART OF PERMANENT CHART     Patient Name: Huyen Renner     : 1950                 Printed: May 4, 2025     Medical Record #: [id number]                                            Page  FOR: (at [facility],     CONTINUOUS GLUCOSE MONITOR (CGM) NURSING CHECKLIST     A Continuous Glucose Monitor (CGM), also referred to as a “sensor”, is a small device that takes frequent blood glucose (BG) readings, approximately every 5 minutes.   BG is measured in interstitial fluid by a tiny  electrode under the skin.   The CGM can be worn up to 10-14 days, depending on the model.   Often used along with an insulin pump, but may also be a stand-alone device.      ON ADMISSION     ?   Document CGM in Medical Devices/Implants in Admission Navigator  ?   When BPA fires, print the GM/Insulin Pump Agreement, place patient label, have patient read and sign and place on        chart.   ?   Add CGM LDA to the IV Assessment flowsheet   ?   Inform patient that HOSPITAL GLUCOSE MONITOR will be used for BG testing   ?   CGM not approved by FDA for inpatient use    ?   Frequent quality tests are performed on hospital glucose monitor                ?   Results of hospital glucose monitor cross over into electronic medical record   ?   Some medications interfere with CGM models including acetaminophen, aspirin, and Vitamin C    IMPORTANT INFORMATION     ? Point of care glucose to be performed using HOSPITAL glucose monitor.   ? Do not treat with insulin or treat hypoglycemia based on CGM values.   ? Patient to remove CGM before MRI, CT, x-ray, or any procedure that uses radiation such as ERCP,         fluoroscopic exam, IV Pyelogram, mammogram, cardiac cath, etc.   ? Exposure to above imaging may damage the CGM causing inaccurate BG readings, or prevent the         device from alarming.   ? Patient to remove CGM prior to surgery as it is often unknown if x-rays or other imaging will be used.   ? If CGM is removed or falls off, give to patient or family, or store according to hospital policy. Not all parts         are disposable and replacement can be very expensive.   ? Every shift, document CGM site assessment   ? Chart removal of CGM     NOTIFY ATTENDING/ENDOCRINOLOGIST IF:     ? Patient refuses to allow finger stick tests with hospital glucose monitor.   ? Any problems or irritation at CGM site.      NOT PART OF PERMANENT CHART  Patient Name: Huyen Renner     : 1950                 Printed: May 4, 2025      Medical Record #: [id number]                                            Page 1/1 24/7

## (undated) NOTE — LETTER
Consent to Procedure/Sedation    Date: ___12/27/2019_______________    Time: 1000    1. I authorize the performance upon Sanjuanita Nair the following:  Temporary HD catheter placement        2. I authorize Dr Koko Díaz.  _________________________ (and whomever ___________________________    ___________________    Witness: ____________________     Date: ______________    Printed: 2019   10:09 AM    Patient Name: Trevon Ervinaruna        : 1950       Medical Record #: [id number]

## (undated) NOTE — LETTER
02/13/19        Angelina Reddy  [address] [facility]      Dear Laura Albright,    [facility] records indicate that you have outstanding lab work and or testing that was ordered for you and has not yet been completed:  Orders Placed This Encounter      VITAM

## (undated) NOTE — LETTER
Consent to Procedure/Sedation    Date: 12/27/2019    Time: _______________    1. I authorize the performance upon Loretta Pena the following:   Temporary Dialysis Catheter Insertion    2.  I authorize Dr. Sujey Penaloza whomever is designated as the doctor’s a Witness: ____________________     Date: ______________    Printed: 2019   9:38 AM    Patient Name: Heather Perera        : 1950       Medical Record #: [id number]